# Patient Record
Sex: FEMALE | Race: WHITE | NOT HISPANIC OR LATINO | Employment: OTHER | URBAN - METROPOLITAN AREA
[De-identification: names, ages, dates, MRNs, and addresses within clinical notes are randomized per-mention and may not be internally consistent; named-entity substitution may affect disease eponyms.]

---

## 2022-07-19 PROCEDURE — U0004 COV-19 TEST NON-CDC HGH THRU: HCPCS | Performed by: FAMILY MEDICINE

## 2022-07-25 ENCOUNTER — TELEPHONE (OUTPATIENT)
Dept: URGENT CARE | Facility: CLINIC | Age: 37
End: 2022-07-25

## 2022-07-25 NOTE — TELEPHONE ENCOUNTER
PT CALLED REQUESTING COVID TEST RESULTS. INFORMED COVID WAS NOT DETECTED. NO QUESTIONS, NO CONCERNS.

## 2022-08-19 ENCOUNTER — HOSPITAL ENCOUNTER (INPATIENT)
Facility: HOSPITAL | Age: 37
LOS: 5 days | Discharge: HOME | End: 2022-08-24
Attending: EMERGENCY MEDICINE | Admitting: INTERNAL MEDICINE

## 2022-08-19 ENCOUNTER — APPOINTMENT (OUTPATIENT)
Dept: GENERAL RADIOLOGY | Facility: HOSPITAL | Age: 37
End: 2022-08-19

## 2022-08-19 ENCOUNTER — APPOINTMENT (OUTPATIENT)
Dept: CT IMAGING | Facility: HOSPITAL | Age: 37
End: 2022-08-19

## 2022-08-19 ENCOUNTER — APPOINTMENT (OUTPATIENT)
Dept: CARDIOLOGY | Facility: HOSPITAL | Age: 37
End: 2022-08-19

## 2022-08-19 DIAGNOSIS — I50.21 ACUTE SYSTOLIC HEART FAILURE: Primary | ICD-10-CM

## 2022-08-19 DIAGNOSIS — I50.9 ACUTE ON CHRONIC CONGESTIVE HEART FAILURE, UNSPECIFIED HEART FAILURE TYPE: ICD-10-CM

## 2022-08-19 DIAGNOSIS — R06.02 SHORTNESS OF BREATH: ICD-10-CM

## 2022-08-19 PROBLEM — I34.0 NONRHEUMATIC MITRAL VALVE REGURGITATION: Status: ACTIVE | Noted: 2022-08-19

## 2022-08-19 PROBLEM — R06.03 RESPIRATORY DISTRESS: Status: ACTIVE | Noted: 2022-08-19

## 2022-08-19 PROBLEM — E87.70 VOLUME OVERLOAD: Status: ACTIVE | Noted: 2022-08-19

## 2022-08-19 LAB
ALBUMIN SERPL-MCNC: 3.4 G/DL (ref 3.5–5.2)
ALBUMIN/GLOB SERPL: 1.1 G/DL
ALP SERPL-CCNC: 88 U/L (ref 39–117)
ALT SERPL W P-5'-P-CCNC: 48 U/L (ref 1–33)
ANION GAP SERPL CALCULATED.3IONS-SCNC: 7 MMOL/L (ref 5–15)
ASCENDING AORTA: 3 CM
AST SERPL-CCNC: 56 U/L (ref 1–32)
B PARAPERT DNA SPEC QL NAA+PROBE: NOT DETECTED
B PERT DNA SPEC QL NAA+PROBE: NOT DETECTED
BASOPHILS # BLD AUTO: 0.08 10*3/MM3 (ref 0–0.2)
BASOPHILS NFR BLD AUTO: 0.7 % (ref 0–1.5)
BH CV ECHO MEAS - AO MAX PG: 7.3 MMHG
BH CV ECHO MEAS - AO MEAN PG: 4 MMHG
BH CV ECHO MEAS - AO ROOT DIAM: 2.6 CM
BH CV ECHO MEAS - AO V2 MAX: 135 CM/SEC
BH CV ECHO MEAS - AO V2 VTI: 20.4 CM
BH CV ECHO MEAS - AVA(I,D): 3 CM2
BH CV ECHO MEAS - EDV(CUBED): 238.3 ML
BH CV ECHO MEAS - EDV(MOD-SP2): 205 ML
BH CV ECHO MEAS - EDV(MOD-SP4): 244 ML
BH CV ECHO MEAS - EF(MOD-BP): 18.6 %
BH CV ECHO MEAS - EF(MOD-SP2): 10.2 %
BH CV ECHO MEAS - EF(MOD-SP4): 25 %
BH CV ECHO MEAS - ESV(CUBED): 175.6 ML
BH CV ECHO MEAS - ESV(MOD-SP2): 184 ML
BH CV ECHO MEAS - ESV(MOD-SP4): 183 ML
BH CV ECHO MEAS - FS: 9.7 %
BH CV ECHO MEAS - IVS/LVPW: 1.13 CM
BH CV ECHO MEAS - IVSD: 0.9 CM
BH CV ECHO MEAS - LA DIMENSION: 4.4 CM
BH CV ECHO MEAS - LAT PEAK E' VEL: 11.4 CM/SEC
BH CV ECHO MEAS - LV DIASTOLIC VOL/BSA (35-75): 122.3 CM2
BH CV ECHO MEAS - LV MASS(C)D: 212.5 GRAMS
BH CV ECHO MEAS - LV MAX PG: 5.4 MMHG
BH CV ECHO MEAS - LV MEAN PG: 2 MMHG
BH CV ECHO MEAS - LV SYSTOLIC VOL/BSA (12-30): 91.7 CM2
BH CV ECHO MEAS - LV V1 MAX: 116 CM/SEC
BH CV ECHO MEAS - LV V1 VTI: 16 CM
BH CV ECHO MEAS - LVIDD: 6.2 CM
BH CV ECHO MEAS - LVIDS: 5.6 CM
BH CV ECHO MEAS - LVOT AREA: 3.8 CM2
BH CV ECHO MEAS - LVOT DIAM: 2.2 CM
BH CV ECHO MEAS - LVPWD: 0.8 CM
BH CV ECHO MEAS - MED PEAK E' VEL: 13.2 CM/SEC
BH CV ECHO MEAS - MR MAX PG: 82.1 MMHG
BH CV ECHO MEAS - MR MAX VEL: 453 CM/SEC
BH CV ECHO MEAS - MR MEAN PG: 51 MMHG
BH CV ECHO MEAS - MR MEAN VEL: 324 CM/SEC
BH CV ECHO MEAS - MR VTI: 117 CM
BH CV ECHO MEAS - MV DEC SLOPE: 1141 CM/SEC2
BH CV ECHO MEAS - MV DEC TIME: 0.18 MSEC
BH CV ECHO MEAS - MV E MAX VEL: 127 CM/SEC
BH CV ECHO MEAS - MV P1/2T: 49.5 MSEC
BH CV ECHO MEAS - MVA(P1/2T): 4.4 CM2
BH CV ECHO MEAS - PA ACC TIME: 0.09 SEC
BH CV ECHO MEAS - PA PR(ACCEL): 37.6 MMHG
BH CV ECHO MEAS - PA V2 MAX: 72 CM/SEC
BH CV ECHO MEAS - RAP SYSTOLE: 15 MMHG
BH CV ECHO MEAS - RVSP: 51 MMHG
BH CV ECHO MEAS - SI(MOD-SP2): 10.5 ML/M2
BH CV ECHO MEAS - SI(MOD-SP4): 30.6 ML/M2
BH CV ECHO MEAS - SV(LVOT): 60.8 ML
BH CV ECHO MEAS - SV(MOD-SP2): 21 ML
BH CV ECHO MEAS - SV(MOD-SP4): 61 ML
BH CV ECHO MEAS - TAPSE (>1.6): 1.92 CM
BH CV ECHO MEAS - TR MAX PG: 36 MMHG
BH CV ECHO MEAS - TR MAX VEL: 300 CM/SEC
BH CV ECHO MEASUREMENTS AVERAGE E/E' RATIO: 10.33
BH CV VAS BP LEFT ARM: NORMAL MMHG
BH CV XLRA - RV BASE: 3.9 CM
BH CV XLRA - RV LENGTH: 7 CM
BH CV XLRA - RV MID: 2.8 CM
BH CV XLRA - TDI S': 11.9 CM/SEC
BILIRUB SERPL-MCNC: 0.3 MG/DL (ref 0–1.2)
BUN SERPL-MCNC: 13 MG/DL (ref 6–20)
BUN/CREAT SERPL: 20.6 (ref 7–25)
C PNEUM DNA NPH QL NAA+NON-PROBE: NOT DETECTED
CALCIUM SPEC-SCNC: 8.9 MG/DL (ref 8.6–10.5)
CHLORIDE SERPL-SCNC: 107 MMOL/L (ref 98–107)
CO2 SERPL-SCNC: 26 MMOL/L (ref 22–29)
CREAT SERPL-MCNC: 0.63 MG/DL (ref 0.57–1)
DEPRECATED RDW RBC AUTO: 49.7 FL (ref 37–54)
EGFRCR SERPLBLD CKD-EPI 2021: 116.6 ML/MIN/1.73
EOSINOPHIL # BLD AUTO: 0.46 10*3/MM3 (ref 0–0.4)
EOSINOPHIL NFR BLD AUTO: 4.1 % (ref 0.3–6.2)
ERYTHROCYTE [DISTWIDTH] IN BLOOD BY AUTOMATED COUNT: 14.1 % (ref 12.3–15.4)
FLUAV SUBTYP SPEC NAA+PROBE: NOT DETECTED
FLUBV RNA ISLT QL NAA+PROBE: NOT DETECTED
GLOBULIN UR ELPH-MCNC: 3.1 GM/DL
GLUCOSE SERPL-MCNC: 108 MG/DL (ref 65–99)
HADV DNA SPEC NAA+PROBE: NOT DETECTED
HCOV 229E RNA SPEC QL NAA+PROBE: NOT DETECTED
HCOV HKU1 RNA SPEC QL NAA+PROBE: NOT DETECTED
HCOV NL63 RNA SPEC QL NAA+PROBE: NOT DETECTED
HCOV OC43 RNA SPEC QL NAA+PROBE: NOT DETECTED
HCT VFR BLD AUTO: 38 % (ref 34–46.6)
HGB BLD-MCNC: 12.3 G/DL (ref 12–15.9)
HMPV RNA NPH QL NAA+NON-PROBE: NOT DETECTED
HOLD SPECIMEN: NORMAL
HPIV1 RNA ISLT QL NAA+PROBE: NOT DETECTED
HPIV2 RNA SPEC QL NAA+PROBE: NOT DETECTED
HPIV3 RNA NPH QL NAA+PROBE: NOT DETECTED
HPIV4 P GENE NPH QL NAA+PROBE: NOT DETECTED
IMM GRANULOCYTES # BLD AUTO: 0.04 10*3/MM3 (ref 0–0.05)
IMM GRANULOCYTES NFR BLD AUTO: 0.4 % (ref 0–0.5)
LEFT ATRIUM VOLUME INDEX: 45.2 ML/M2
LYMPHOCYTES # BLD AUTO: 3.69 10*3/MM3 (ref 0.7–3.1)
LYMPHOCYTES NFR BLD AUTO: 32.9 % (ref 19.6–45.3)
M PNEUMO IGG SER IA-ACNC: NOT DETECTED
MAXIMAL PREDICTED HEART RATE: 182 BPM
MCH RBC QN AUTO: 31.1 PG (ref 26.6–33)
MCHC RBC AUTO-ENTMCNC: 32.4 G/DL (ref 31.5–35.7)
MCV RBC AUTO: 96 FL (ref 79–97)
MONOCYTES # BLD AUTO: 0.99 10*3/MM3 (ref 0.1–0.9)
MONOCYTES NFR BLD AUTO: 8.8 % (ref 5–12)
NEUTROPHILS NFR BLD AUTO: 5.97 10*3/MM3 (ref 1.7–7)
NEUTROPHILS NFR BLD AUTO: 53.1 % (ref 42.7–76)
NRBC BLD AUTO-RTO: 0 /100 WBC (ref 0–0.2)
NT-PROBNP SERPL-MCNC: 5156 PG/ML (ref 0–450)
PLATELET # BLD AUTO: 447 10*3/MM3 (ref 140–450)
PMV BLD AUTO: 9.8 FL (ref 6–12)
POTASSIUM SERPL-SCNC: 4.4 MMOL/L (ref 3.5–5.2)
PROT SERPL-MCNC: 6.5 G/DL (ref 6–8.5)
RBC # BLD AUTO: 3.96 10*6/MM3 (ref 3.77–5.28)
RHINOVIRUS RNA SPEC NAA+PROBE: NOT DETECTED
RSV RNA NPH QL NAA+NON-PROBE: NOT DETECTED
SARS-COV-2 RNA NPH QL NAA+NON-PROBE: NOT DETECTED
SODIUM SERPL-SCNC: 140 MMOL/L (ref 136–145)
STRESS TARGET HR: 155 BPM
TROPONIN T SERPL-MCNC: <0.01 NG/ML (ref 0–0.03)
TSH SERPL DL<=0.05 MIU/L-ACNC: 3.23 UIU/ML (ref 0.27–4.2)
WBC NRBC COR # BLD: 11.23 10*3/MM3 (ref 3.4–10.8)
WHOLE BLOOD HOLD COAG: NORMAL
WHOLE BLOOD HOLD SPECIMEN: NORMAL

## 2022-08-19 PROCEDURE — 84484 ASSAY OF TROPONIN QUANT: CPT

## 2022-08-19 PROCEDURE — 71045 X-RAY EXAM CHEST 1 VIEW: CPT

## 2022-08-19 PROCEDURE — 25010000002 MORPHINE PER 10 MG: Performed by: EMERGENCY MEDICINE

## 2022-08-19 PROCEDURE — 25010000002 FUROSEMIDE PER 20 MG: Performed by: EMERGENCY MEDICINE

## 2022-08-19 PROCEDURE — 93306 TTE W/DOPPLER COMPLETE: CPT | Performed by: INTERNAL MEDICINE

## 2022-08-19 PROCEDURE — 99284 EMERGENCY DEPT VISIT MOD MDM: CPT

## 2022-08-19 PROCEDURE — 99222 1ST HOSP IP/OBS MODERATE 55: CPT | Performed by: STUDENT IN AN ORGANIZED HEALTH CARE EDUCATION/TRAINING PROGRAM

## 2022-08-19 PROCEDURE — 93306 TTE W/DOPPLER COMPLETE: CPT

## 2022-08-19 PROCEDURE — 0202U NFCT DS 22 TRGT SARS-COV-2: CPT | Performed by: EMERGENCY MEDICINE

## 2022-08-19 PROCEDURE — 25010000002 ENOXAPARIN PER 10 MG: Performed by: STUDENT IN AN ORGANIZED HEALTH CARE EDUCATION/TRAINING PROGRAM

## 2022-08-19 PROCEDURE — 93005 ELECTROCARDIOGRAM TRACING: CPT

## 2022-08-19 PROCEDURE — 71275 CT ANGIOGRAPHY CHEST: CPT

## 2022-08-19 PROCEDURE — 80050 GENERAL HEALTH PANEL: CPT

## 2022-08-19 PROCEDURE — 25010000002 METHYLPREDNISOLONE PER 125 MG: Performed by: EMERGENCY MEDICINE

## 2022-08-19 PROCEDURE — 83880 ASSAY OF NATRIURETIC PEPTIDE: CPT

## 2022-08-19 PROCEDURE — 0 IOPAMIDOL PER 1 ML: Performed by: EMERGENCY MEDICINE

## 2022-08-19 PROCEDURE — 25010000002 SULFUR HEXAFLUORIDE MICROSPH 60.7-25 MG RECONSTITUTED SUSPENSION: Performed by: STUDENT IN AN ORGANIZED HEALTH CARE EDUCATION/TRAINING PROGRAM

## 2022-08-19 PROCEDURE — 94640 AIRWAY INHALATION TREATMENT: CPT

## 2022-08-19 RX ORDER — SODIUM CHLORIDE 0.9 % (FLUSH) 0.9 %
10 SYRINGE (ML) INJECTION EVERY 12 HOURS SCHEDULED
Status: DISCONTINUED | OUTPATIENT
Start: 2022-08-19 | End: 2022-08-24 | Stop reason: HOSPADM

## 2022-08-19 RX ORDER — FUROSEMIDE 10 MG/ML
80 INJECTION INTRAMUSCULAR; INTRAVENOUS ONCE
Status: COMPLETED | OUTPATIENT
Start: 2022-08-19 | End: 2022-08-19

## 2022-08-19 RX ORDER — SODIUM CHLORIDE 0.9 % (FLUSH) 0.9 %
10 SYRINGE (ML) INJECTION AS NEEDED
Status: DISCONTINUED | OUTPATIENT
Start: 2022-08-19 | End: 2022-08-24 | Stop reason: HOSPADM

## 2022-08-19 RX ORDER — METHYLPREDNISOLONE SODIUM SUCCINATE 125 MG/2ML
125 INJECTION, POWDER, LYOPHILIZED, FOR SOLUTION INTRAMUSCULAR; INTRAVENOUS ONCE
Status: COMPLETED | OUTPATIENT
Start: 2022-08-19 | End: 2022-08-19

## 2022-08-19 RX ORDER — FUROSEMIDE 10 MG/ML
40 INJECTION INTRAMUSCULAR; INTRAVENOUS DAILY
Status: DISCONTINUED | OUTPATIENT
Start: 2022-08-20 | End: 2022-08-21

## 2022-08-19 RX ORDER — ENOXAPARIN SODIUM 100 MG/ML
40 INJECTION SUBCUTANEOUS DAILY
Status: DISCONTINUED | OUTPATIENT
Start: 2022-08-19 | End: 2022-08-22

## 2022-08-19 RX ORDER — ALBUTEROL SULFATE 2.5 MG/3ML
2.5 SOLUTION RESPIRATORY (INHALATION) EVERY 6 HOURS PRN
Refills: 0 | Status: DISCONTINUED | OUTPATIENT
Start: 2022-08-19 | End: 2022-08-24 | Stop reason: HOSPADM

## 2022-08-19 RX ORDER — BENZONATATE 100 MG/1
200 CAPSULE ORAL 3 TIMES DAILY PRN
Status: DISCONTINUED | OUTPATIENT
Start: 2022-08-19 | End: 2022-08-24 | Stop reason: HOSPADM

## 2022-08-19 RX ORDER — GUAIFENESIN AND DEXTROMETHORPHAN HYDROBROMIDE 100; 10 MG/5ML; MG/5ML
5 SOLUTION ORAL EVERY 12 HOURS
Status: ON HOLD | COMMUNITY
End: 2022-08-20

## 2022-08-19 RX ORDER — IPRATROPIUM BROMIDE AND ALBUTEROL SULFATE 2.5; .5 MG/3ML; MG/3ML
3 SOLUTION RESPIRATORY (INHALATION) ONCE
Status: COMPLETED | OUTPATIENT
Start: 2022-08-19 | End: 2022-08-19

## 2022-08-19 RX ORDER — MORPHINE SULFATE 2 MG/ML
2 INJECTION, SOLUTION INTRAMUSCULAR; INTRAVENOUS
Status: DISCONTINUED | OUTPATIENT
Start: 2022-08-19 | End: 2022-08-24 | Stop reason: HOSPADM

## 2022-08-19 RX ADMIN — IPRATROPIUM BROMIDE AND ALBUTEROL SULFATE 3 ML: .5; 3 SOLUTION RESPIRATORY (INHALATION) at 14:48

## 2022-08-19 RX ADMIN — FUROSEMIDE 80 MG: 10 INJECTION, SOLUTION INTRAMUSCULAR; INTRAVENOUS at 15:51

## 2022-08-19 RX ADMIN — IOPAMIDOL 85 ML: 755 INJECTION, SOLUTION INTRAVENOUS at 14:33

## 2022-08-19 RX ADMIN — METHYLPREDNISOLONE SODIUM SUCCINATE 125 MG: 125 INJECTION, POWDER, FOR SOLUTION INTRAMUSCULAR; INTRAVENOUS at 14:00

## 2022-08-19 RX ADMIN — ENOXAPARIN SODIUM 40 MG: 40 INJECTION SUBCUTANEOUS at 21:06

## 2022-08-19 RX ADMIN — MORPHINE SULFATE 2 MG: 2 INJECTION, SOLUTION INTRAMUSCULAR; INTRAVENOUS at 21:10

## 2022-08-19 RX ADMIN — Medication 10 ML: at 21:06

## 2022-08-19 RX ADMIN — MORPHINE SULFATE 2 MG: 2 INJECTION, SOLUTION INTRAMUSCULAR; INTRAVENOUS at 12:39

## 2022-08-19 RX ADMIN — SULFUR HEXAFLUORIDE 2 ML: KIT at 17:03

## 2022-08-19 NOTE — ED PROVIDER NOTES
Subjective   Pt is a 36 y/o female who c/o of shortness of breath and cough x 1 month. Pt had swelling on her hands and face which was new, and prompted her visit to the ER today. Pt has been previously treated for acute bronchitis, and finished the steroid pack 1.5 weeks ago. Pt has pain with inspiration and cough which she rates as an 8/10. The pain is located in her chest and epigastric region with no radiation. Pt states the cough is productive with clear sputum. Pt had no relief of the swelling with benadryl. Pt has also had no relief from inhaler or breathing treatment she was given at first urgent care visit. Pt has had no known sick contacts and no one around her has similar sx. Pt denies eating any new food or trying any new facial/body products before the swelling occurred.She notes a history of COPD but does not take regular medication for this or follow with a PCP. Denies smoking.          Review of Systems   Constitutional: Negative for fever.   Respiratory: Positive for cough and shortness of breath.    Cardiovascular:        Chest and epigastric pain with inspiration   All other systems reviewed and are negative.      Past Medical History:   Diagnosis Date   • Asthma    • COPD (chronic obstructive pulmonary disease) ([facility])        Allergies   Allergen Reactions   • Amoxicillin Swelling       No past surgical history on file.    No family history on file.    Social History     Socioeconomic History   • Marital status: Single   Tobacco Use   • Smoking status: Passive Smoke Exposure - Never Smoker   • Smokeless tobacco: Never Used   Vaping Use   • Vaping Use: Never used   Substance and Sexual Activity   • Alcohol use: Never   • Drug use: Never   • Sexual activity: Defer           Objective   Physical Exam  Vitals and nursing note reviewed.   HENT:      Head: Normocephalic and atraumatic.      Mouth/Throat:      Mouth: Mucous membranes are moist.   Eyes:      Extraocular Movements: Extraocular movements  intact.      Pupils: Pupils are equal, round, and reactive to light.   Cardiovascular:      Rate and Rhythm: Regular rhythm. Tachycardia present.   Pulmonary:      Effort: Respiratory distress (Mild respiratory distress) present.      Breath sounds: Wheezing present. No decreased breath sounds, rhonchi or rales.   Chest:      Chest wall: Tenderness present. No edema.   Abdominal:      General: Bowel sounds are normal.      Palpations: Abdomen is soft.   Musculoskeletal:         General: Normal range of motion.      Cervical back: Normal range of motion.      Right lower leg: No edema.      Left lower leg: No edema.   Skin:     Findings: No erythema or rash.   Neurological:      General: No focal deficit present.      Mental Status: She is alert and oriented to person, place, and time.   Psychiatric:         Mood and Affect: Mood normal.         Behavior: Behavior normal.         Procedures           ED Course      Adult Transthoracic Echo Complete W/ Cont if Necessary Per Protocol    Result Date: 8/19/2022  · Left ventricular ejection fraction appears to be less than 20%. Left ventricular systolic function is severely decreased. · Left ventricular diastolic function is consistent with (grade II w/high LAP) pseudonormalization. · Severe mitral valve regurgitation is present. · The left ventricular cavity is moderately dilated. · Left atrial volume is moderately increased. · Estimated right ventricular systolic pressure from tricuspid regurgitation is moderately elevated (45-55 mmHg). Calculated right ventricular systolic pressure from tricuspid regurgitation is 51 mmHg. · Mildly reduced right ventricular systolic function noted.      XR Chest 2 View    Result Date: 8/4/2022  DATE OF EXAM: 8/4/2022 11:46 AM  PROCEDURE: XR CHEST 2 VW-  INDICATIONS: cough for 2 weeks  COMPARISON: No comparisons available.  TECHNIQUE: Two radiologic views of the chest, PA and lateral, were obtained.  FINDINGS: The heart size appears  normal. There is mild peribronchial thickening most notable in the right base. No focal consolidation is identified. The lungs are otherwise clear      Peribronchial thickening particularly in the right base which could reflect bronchitis or mild bronchiectasis. Chest otherwise clear  This report was finalized on 8/4/2022 12:04 PM by Bairon Traore MD.         XR Chest 1 View    Result Date: 8/19/2022  XR CHEST 1 VW-  Date of Exam: 8/19/2022 10:50 AM  Indication: SOA triage protocol.  Comparison Exams: August 4, 2022  Technique: Single AP chest radiograph  FINDINGS: There is a mild opacity in the right lower lung. The heart and mediastinal contours appear normal. The pulmonary vasculature appears normal. The osseous structures appear intact.      Mild opacity within right lower lung, which could reflect atelectasis or pneumonia.  This report was finalized on 8/19/2022 11:15 AM by Onur Baires MD.      CT Angiogram Chest    Result Date: 8/19/2022  DATE OF EXAM: 8/19/2022 2:30 PM  PROCEDURE: CT ANGIOGRAM CHEST-  INDICATIONS: SOA, multiple physician visists  COMPARISON: No comparisons available.  TECHNIQUE: Contiguous axial imaging was obtained from the thoracic inlet through the upper abdomen following the intravenous administration of 100 mL of Isovue 370. Reconstructed coronal and sagittal images were also obtained. Automated exposure control and iterative reconstruction methods were used.  The radiation dose reduction device was turned on for each scan per the ALARA (As Low as Reasonably Achievable) protocol.  FINDINGS: There is no pathologic axillary adenopathy or other worrisome body wall soft tissue finding in the chest. No acute findings are present in the partially characterized upper abdomen. There are are moderate to large bilateral pleural effusions. There is no significant pericardial effusion. There is no pathologic mediastinal lymphadenopathy. Nonaneurysmal thoracic aorta. Evaluation of the osseous  structures demonstrates no evidence of acute fracture or aggressive osseous lesion. Evaluation of the lung fields demonstrates diffuse interlobular septal thickening with intervening groundglass opacity consistent with pulmonary edema. There is no focal consolidation or suspicious pulmonary nodularity. The pulmonary arteries are well-opacified, without focal filling defect concerning for acute pulmonary embolus.      Moderate to severe pulmonary edema with moderate to large bilateral pleural effusions present. There is no acute pulmonary embolus.  This report was finalized on 8/19/2022 2:44 PM by Jamison Hicks.          Latest Reference Range & Units 08/19/22 10:50   Troponin T 0.000 - 0.030 ng/mL <0.010   proBNP 0.0 - 450.0 pg/mL 5,156.0 (H)   Glucose 65 - 99 mg/dL 108 (H)   Sodium 136 - 145 mmol/L 140   Potassium 3.5 - 5.2 mmol/L 4.4   CO2 22.0 - 29.0 mmol/L 26.0   Chloride 98 - 107 mmol/L 107   Anion Gap 5.0 - 15.0 mmol/L 7.0   Creatinine 0.57 - 1.00 mg/dL 0.63   BUN 6 - 20 mg/dL 13   BUN/Creatinine Ratio 7.0 - 25.0  20.6   Calcium 8.6 - 10.5 mg/dL 8.9   eGFR >60.0 mL/min/1.73 116.6   Alkaline Phosphatase 39 - 117 U/L 88   Total Protein 6.0 - 8.5 g/dL 6.5   ALT (SGPT) 1 - 33 U/L 48 (H)   AST (SGOT) 1 - 32 U/L 56 (H)   Total Bilirubin 0.0 - 1.2 mg/dL 0.3   Albumin 3.50 - 5.20 g/dL 3.40 (L)   Globulin gm/dL 3.1   A/G Ratio g/dL 1.1   TSH Baseline 0.270 - 4.200 uIU/mL 3.230   WBC 3.40 - 10.80 10*3/mm3 11.23 (H)   RBC 3.77 - 5.28 10*6/mm3 3.96   Hemoglobin 12.0 - 15.9 g/dL 12.3   Hematocrit 34.0 - 46.6 % 38.0   RDW 12.3 - 15.4 % 14.1   MCV 79.0 - 97.0 fL 96.0   MCH 26.6 - 33.0 pg 31.1   MCHC 31.5 - 35.7 g/dL 32.4   MPV 6.0 - 12.0 fL 9.8   Platelets 140 - 450 10*3/mm3 447   RDW-SD 37.0 - 54.0 fl 49.7   Neutrophil Rel % 42.7 - 76.0 % 53.1   Lymphocyte Rel % 19.6 - 45.3 % 32.9   Monocyte Rel % 5.0 - 12.0 % 8.8   Eosinophil Rel % 0.3 - 6.2 % 4.1   Basophil Rel % 0.0 - 1.5 % 0.7   Immature Granulocyte Rel % 0.0 - 0.5 %  0.4   Neutrophils Absolute 1.70 - 7.00 10*3/mm3 5.97   Lymphocytes Absolute 0.70 - 3.10 10*3/mm3 3.69 (H)   Monocytes Absolute 0.10 - 0.90 10*3/mm3 0.99 (H)   Eosinophils Absolute 0.00 - 0.40 10*3/mm3 0.46 (H)   Basophils Absolute 0.00 - 0.20 10*3/mm3 0.08   Immature Grans, Absolute 0.00 - 0.05 10*3/mm3 0.04   nRBC 0.0 - 0.2 /100 WBC 0.0   (H): Data is abnormally high  (L): Data is abnormally low                                MDM    Final diagnoses:   Shortness of breath   Acute on chronic congestive heart failure, unspecified heart failure type (HCC)       ED Disposition  ED Disposition     ED Disposition   Decision to Admit    Condition   --    Comment   Level of Care: Telemetry [5]   Diagnosis: Respiratory distress [328574]   Certification: I Certify That Inpatient Hospital Services Are Medically Necessary For Greater Than 2 Midnights                  Moshe Mei DO  08/28/22 8906

## 2022-08-20 PROBLEM — E66.9 OBESITY (BMI 30-39.9): Status: ACTIVE | Noted: 2022-08-20

## 2022-08-20 PROBLEM — J44.9 COPD (CHRONIC OBSTRUCTIVE PULMONARY DISEASE): Status: ACTIVE | Noted: 2022-08-20

## 2022-08-20 LAB
ALBUMIN SERPL-MCNC: 3.1 G/DL (ref 3.5–5.2)
ALBUMIN/GLOB SERPL: 0.9 G/DL
ALP SERPL-CCNC: 93 U/L (ref 39–117)
ALT SERPL W P-5'-P-CCNC: 45 U/L (ref 1–33)
AMPHET+METHAMPHET UR QL: NEGATIVE
AMPHETAMINES UR QL: POSITIVE
ANION GAP SERPL CALCULATED.3IONS-SCNC: 9 MMOL/L (ref 5–15)
AST SERPL-CCNC: 50 U/L (ref 1–32)
BARBITURATES UR QL SCN: NEGATIVE
BASOPHILS # BLD AUTO: 0.04 10*3/MM3 (ref 0–0.2)
BASOPHILS NFR BLD AUTO: 0.2 % (ref 0–1.5)
BENZODIAZ UR QL SCN: NEGATIVE
BILIRUB SERPL-MCNC: 0.3 MG/DL (ref 0–1.2)
BUN SERPL-MCNC: 16 MG/DL (ref 6–20)
BUN/CREAT SERPL: 18.8 (ref 7–25)
BUPRENORPHINE SERPL-MCNC: NEGATIVE NG/ML
CALCIUM SPEC-SCNC: 8.7 MG/DL (ref 8.6–10.5)
CANNABINOIDS SERPL QL: NEGATIVE
CHLORIDE SERPL-SCNC: 100 MMOL/L (ref 98–107)
CO2 SERPL-SCNC: 27 MMOL/L (ref 22–29)
COCAINE UR QL: NEGATIVE
CREAT SERPL-MCNC: 0.85 MG/DL (ref 0.57–1)
DEPRECATED RDW RBC AUTO: 46.4 FL (ref 37–54)
EGFRCR SERPLBLD CKD-EPI 2021: 90.1 ML/MIN/1.73
EOSINOPHIL # BLD AUTO: 0.01 10*3/MM3 (ref 0–0.4)
EOSINOPHIL NFR BLD AUTO: 0.1 % (ref 0.3–6.2)
ERYTHROCYTE [DISTWIDTH] IN BLOOD BY AUTOMATED COUNT: 13.8 % (ref 12.3–15.4)
GLOBULIN UR ELPH-MCNC: 3.6 GM/DL
GLUCOSE SERPL-MCNC: 144 MG/DL (ref 65–99)
HCT VFR BLD AUTO: 37.9 % (ref 34–46.6)
HGB BLD-MCNC: 12.8 G/DL (ref 12–15.9)
IMM GRANULOCYTES # BLD AUTO: 0.12 10*3/MM3 (ref 0–0.05)
IMM GRANULOCYTES NFR BLD AUTO: 0.7 % (ref 0–0.5)
LYMPHOCYTES # BLD AUTO: 2.15 10*3/MM3 (ref 0.7–3.1)
LYMPHOCYTES NFR BLD AUTO: 11.9 % (ref 19.6–45.3)
MCH RBC QN AUTO: 31.4 PG (ref 26.6–33)
MCHC RBC AUTO-ENTMCNC: 33.8 G/DL (ref 31.5–35.7)
MCV RBC AUTO: 93.1 FL (ref 79–97)
METHADONE UR QL SCN: NEGATIVE
MONOCYTES # BLD AUTO: 1.17 10*3/MM3 (ref 0.1–0.9)
MONOCYTES NFR BLD AUTO: 6.5 % (ref 5–12)
NEUTROPHILS NFR BLD AUTO: 14.57 10*3/MM3 (ref 1.7–7)
NEUTROPHILS NFR BLD AUTO: 80.6 % (ref 42.7–76)
NRBC BLD AUTO-RTO: 0 /100 WBC (ref 0–0.2)
OPIATES UR QL: POSITIVE
OXYCODONE UR QL SCN: NEGATIVE
PCP UR QL SCN: NEGATIVE
PLATELET # BLD AUTO: 539 10*3/MM3 (ref 140–450)
PMV BLD AUTO: 9.9 FL (ref 6–12)
POTASSIUM SERPL-SCNC: 4.7 MMOL/L (ref 3.5–5.2)
PROCALCITONIN SERPL-MCNC: 0.03 NG/ML (ref 0–0.25)
PROPOXYPH UR QL: NEGATIVE
PROT SERPL-MCNC: 6.7 G/DL (ref 6–8.5)
RBC # BLD AUTO: 4.07 10*6/MM3 (ref 3.77–5.28)
SODIUM SERPL-SCNC: 136 MMOL/L (ref 136–145)
TRICYCLICS UR QL SCN: NEGATIVE
TROPONIN T SERPL-MCNC: <0.01 NG/ML (ref 0–0.03)
WBC NRBC COR # BLD: 18.06 10*3/MM3 (ref 3.4–10.8)

## 2022-08-20 PROCEDURE — 80053 COMPREHEN METABOLIC PANEL: CPT | Performed by: STUDENT IN AN ORGANIZED HEALTH CARE EDUCATION/TRAINING PROGRAM

## 2022-08-20 PROCEDURE — 85025 COMPLETE CBC W/AUTO DIFF WBC: CPT | Performed by: STUDENT IN AN ORGANIZED HEALTH CARE EDUCATION/TRAINING PROGRAM

## 2022-08-20 PROCEDURE — 84145 PROCALCITONIN (PCT): CPT | Performed by: INTERNAL MEDICINE

## 2022-08-20 PROCEDURE — 80306 DRUG TEST PRSMV INSTRMNT: CPT | Performed by: NURSE PRACTITIONER

## 2022-08-20 PROCEDURE — 99222 1ST HOSP IP/OBS MODERATE 55: CPT | Performed by: INTERNAL MEDICINE

## 2022-08-20 PROCEDURE — 25010000002 FUROSEMIDE PER 20 MG: Performed by: STUDENT IN AN ORGANIZED HEALTH CARE EDUCATION/TRAINING PROGRAM

## 2022-08-20 PROCEDURE — 99232 SBSQ HOSP IP/OBS MODERATE 35: CPT | Performed by: INTERNAL MEDICINE

## 2022-08-20 PROCEDURE — 84484 ASSAY OF TROPONIN QUANT: CPT | Performed by: STUDENT IN AN ORGANIZED HEALTH CARE EDUCATION/TRAINING PROGRAM

## 2022-08-20 PROCEDURE — 25010000002 ENOXAPARIN PER 10 MG: Performed by: STUDENT IN AN ORGANIZED HEALTH CARE EDUCATION/TRAINING PROGRAM

## 2022-08-20 RX ORDER — DEXTROMETHORPHAN HYDROBROMIDE AND PROMETHAZINE HYDROCHLORIDE 15; 6.25 MG/5ML; MG/5ML
5 SOLUTION ORAL 4 TIMES DAILY PRN
COMMUNITY
End: 2022-08-24 | Stop reason: HOSPADM

## 2022-08-20 RX ORDER — DIGOXIN 250 MCG
250 TABLET ORAL
Status: DISCONTINUED | OUTPATIENT
Start: 2022-08-20 | End: 2022-08-24 | Stop reason: HOSPADM

## 2022-08-20 RX ADMIN — SACUBITRIL AND VALSARTAN 0.5 TABLET: 24; 26 TABLET, FILM COATED ORAL at 14:10

## 2022-08-20 RX ADMIN — Medication 10 ML: at 20:45

## 2022-08-20 RX ADMIN — SACUBITRIL AND VALSARTAN 0.5 TABLET: 24; 26 TABLET, FILM COATED ORAL at 20:45

## 2022-08-20 RX ADMIN — DIGOXIN 250 MCG: 250 TABLET ORAL at 14:10

## 2022-08-20 RX ADMIN — ENOXAPARIN SODIUM 40 MG: 40 INJECTION SUBCUTANEOUS at 08:17

## 2022-08-20 RX ADMIN — Medication 10 ML: at 08:18

## 2022-08-20 RX ADMIN — FUROSEMIDE 40 MG: 10 INJECTION, SOLUTION INTRAMUSCULAR; INTRAVENOUS at 08:17

## 2022-08-20 NOTE — H&P
[facility] Medicine Services  HISTORY AND PHYSICAL    Patient Name: Herminia Hernandez  : 1985  MRN: [id number]  Primary Care Physician: Sabine, No Known  Date of admission: 2022      Subjective   Subjective     Chief Complaint:  Dyspnea    HPI:  Herminia Hernandez is a 37 y.o. female with a remote history of what she says is COPD diagnosed as a child, who is presenting with increasing dyspnea and lower extremity swelling.  She states she was diagnosed with COPD when she was 4 secondary to secondhand smoke.  For the past several weeks she has been having increasing dyspnea cough and swelling, she was told that she has bronchitis and was given steroids and breathing treatments without relief.  She is presenting today because the dyspnea is getting worse and worse.      Review of Systems   Gen- No fevers, chills  CV- No chest pain, palpitations  Resp- +dyspnea  GI- No N/V/D, abd pain      All other systems reviewed and are negative.     Personal History     Past Medical History:   Diagnosis Date   • Asthma    • COPD (chronic obstructive pulmonary disease) (HCC)              No past surgical history on file.    Family History: Tobacco abuse, COPD family history is not on file. Otherwise pertinent FHx was reviewed and unremarkable.     Social History:  reports that she is a non-smoker but has been exposed to tobacco smoke. She has never used smokeless tobacco. She reports that she does not drink alcohol and does not use drugs.  Social History     Social History Narrative   • Not on file       Medications:  Available home medication information reviewed.  Medications Prior to Admission   Medication Sig Dispense Refill Last Dose   • albuterol sulfate  (90 Base) MCG/ACT inhaler Inhale 2 puffs 4 (Four) Times a Day. 18 g 0    • dextromethorphan-guaifenesin (ROBITUSSIN-DM)  MG/5ML syrup Take 5 mL by mouth Every 12 (Twelve) Hours.          Allergies   Allergen Reactions   •  Amoxicillin Swelling       Objective   Objective     Vital Signs:   Temp:  [97.9 °F (36.6 °C)-98.1 °F (36.7 °C)] 98.1 °F (36.7 °C)  Heart Rate:  [107-121] 113  Resp:  [16-20] 19  BP: (114-127)/(75-99) 117/75       Physical Exam   Constitutional: Appears to be in mild distress, awake alert  HENT: NCAT, mucous membranes moist  Respiratory: Diffuse crackles bilaterally, appears tachypneic  Cardiovascular: Tachycardic, no murmurs, rubs, or gallops  Gastrointestinal: Positive bowel sounds, soft, nontender, nondistended  Musculoskeletal: 1+ bilateral ankle edema  Psychiatric: Appropriate affect, cooperative  Neurologic: Oriented x 3, strength symmetric in all extremities, Cranial Nerves grossly intact to confrontation, speech clear  Skin: No rashes      Result Review:  I have personally reviewed the results from the time of this admission to 8/19/2022 22:29 EDT and agree with these findings:  [x]  Laboratory list / accordion  [x]  Microbiology  [x]  Radiology  []  EKG/Telemetry   []  Cardiology/Vascular   []  Pathology  []  Old records  []  Other:  Most notable findings include:   Pulmonary edema, bilateral pleural effusions on CTA  Elevated proBNP  Echo performed shows EF of less than 20%        LAB RESULTS:      Lab 08/19/22  1050   WBC 11.23*   HEMOGLOBIN 12.3   HEMATOCRIT 38.0   PLATELETS 447   NEUTROS ABS 5.97   IMMATURE GRANS (ABS) 0.04   LYMPHS ABS 3.69*   MONOS ABS 0.99*   EOS ABS 0.46*   MCV 96.0         Lab 08/19/22  1050   SODIUM 140   POTASSIUM 4.4   CHLORIDE 107   CO2 26.0   ANION GAP 7.0   BUN 13   CREATININE 0.63   EGFR 116.6   GLUCOSE 108*   CALCIUM 8.9   TSH 3.230         Lab 08/19/22  1050   TOTAL PROTEIN 6.5   ALBUMIN 3.40*   GLOBULIN 3.1   ALT (SGPT) 48*   AST (SGOT) 56*   BILIRUBIN 0.3   ALK PHOS 88         Lab 08/19/22  1050   PROBNP 5,156.0*   TROPONIN T <0.010                     Microbiology Results (last 10 days)     Procedure Component Value - Date/Time    COVID PRE-OP / PRE-PROCEDURE SCREENING  ORDER (NO ISOLATION) - Swab, Nasopharynx [[id number]]  (Normal) Collected: 08/19/22 1235    Lab Status: Final result Specimen: Swab from Nasopharynx Updated: 08/19/22 1340    Narrative:      The following orders were created for panel order COVID PRE-OP / PRE-PROCEDURE SCREENING ORDER (NO ISOLATION) - Swab, Nasopharynx.  Procedure                               Abnormality         Status                     ---------                               -----------         ------                     Respiratory Panel PCR w/...[[id number]]  Normal              Final result                 Please view results for these tests on the individual orders.    Respiratory Panel PCR w/COVID-19(SARS-CoV-2) LILLIAN/FABRICE/LIVIA/PAD/COR/MAD/CRISTINA In-House, NP Swab in UTM/VTM, 3-4 HR TAT - Swab, Nasopharynx [[id number]]  (Normal) Collected: 08/19/22 1235    Lab Status: Final result Specimen: Swab from Nasopharynx Updated: 08/19/22 1340     ADENOVIRUS, PCR Not Detected     Coronavirus 229E Not Detected     Coronavirus HKU1 Not Detected     Coronavirus NL63 Not Detected     Coronavirus OC43 Not Detected     COVID19 Not Detected     Human Metapneumovirus Not Detected     Human Rhinovirus/Enterovirus Not Detected     Influenza A PCR Not Detected     Influenza B PCR Not Detected     Parainfluenza Virus 1 Not Detected     Parainfluenza Virus 2 Not Detected     Parainfluenza Virus 3 Not Detected     Parainfluenza Virus 4 Not Detected     RSV, PCR Not Detected     Bordetella pertussis pcr Not Detected     Bordetella parapertussis PCR Not Detected     Chlamydophila pneumoniae PCR Not Detected     Mycoplasma pneumo by PCR Not Detected    Narrative:      In the setting of a positive respiratory panel with a viral infection PLUS a negative procalcitonin without other underlying concern for bacterial infection, consider observing off antibiotics or discontinuation of antibiotics and continue supportive care. If the respiratory panel is positive for atypical  bacterial infection (Bordetella pertussis, Chlamydophila pneumoniae, or Mycoplasma pneumoniae), consider antibiotic de-escalation to target atypical bacterial infection.          Adult Transthoracic Echo Complete W/ Cont if Necessary Per Protocol    Result Date: 8/19/2022  · Left ventricular ejection fraction appears to be less than 20%. Left ventricular systolic function is severely decreased. · Left ventricular diastolic function is consistent with (grade II w/high LAP) pseudonormalization. · Severe mitral valve regurgitation is present. · The left ventricular cavity is moderately dilated. · Left atrial volume is moderately increased. · Estimated right ventricular systolic pressure from tricuspid regurgitation is moderately elevated (45-55 mmHg). Calculated right ventricular systolic pressure from tricuspid regurgitation is 51 mmHg. · Mildly reduced right ventricular systolic function noted.      XR Chest 1 View    Result Date: 8/19/2022  XR CHEST 1 VW-  Date of Exam: 8/19/2022 10:50 AM  Indication: SOA triage protocol.  Comparison Exams: August 4, 2022  Technique: Single AP chest radiograph  FINDINGS: There is a mild opacity in the right lower lung. The heart and mediastinal contours appear normal. The pulmonary vasculature appears normal. The osseous structures appear intact.      Impression: Mild opacity within right lower lung, which could reflect atelectasis or pneumonia.  This report was finalized on 8/19/2022 11:15 AM by Onur Baires MD.      CT Angiogram Chest    Result Date: 8/19/2022  DATE OF EXAM: 8/19/2022 2:30 PM  PROCEDURE: CT ANGIOGRAM CHEST-  INDICATIONS: SOA, multiple physician visists  COMPARISON: No comparisons available.  TECHNIQUE: Contiguous axial imaging was obtained from the thoracic inlet through the upper abdomen following the intravenous administration of 100 mL of Isovue 370. Reconstructed coronal and sagittal images were also obtained. Automated exposure control and iterative  reconstruction methods were used.  The radiation dose reduction device was turned on for each scan per the ALARA (As Low as Reasonably Achievable) protocol.  FINDINGS: There is no pathologic axillary adenopathy or other worrisome body wall soft tissue finding in the chest. No acute findings are present in the partially characterized upper abdomen. There are are moderate to large bilateral pleural effusions. There is no significant pericardial effusion. There is no pathologic mediastinal lymphadenopathy. Nonaneurysmal thoracic aorta. Evaluation of the osseous structures demonstrates no evidence of acute fracture or aggressive osseous lesion. Evaluation of the lung fields demonstrates diffuse interlobular septal thickening with intervening groundglass opacity consistent with pulmonary edema. There is no focal consolidation or suspicious pulmonary nodularity. The pulmonary arteries are well-opacified, without focal filling defect concerning for acute pulmonary embolus.      Impression: Moderate to severe pulmonary edema with moderate to large bilateral pleural effusions present. There is no acute pulmonary embolus.  This report was finalized on 8/19/2022 2:44 PM by Jamison Hicks.        Results for orders placed during the hospital encounter of 08/19/22    Adult Transthoracic Echo Complete W/ Cont if Necessary Per Protocol    Interpretation Summary  · Left ventricular ejection fraction appears to be less than 20%. Left ventricular systolic function is severely decreased.  · Left ventricular diastolic function is consistent with (grade II w/high LAP) pseudonormalization.  · Severe mitral valve regurgitation is present.  · The left ventricular cavity is moderately dilated.  · Left atrial volume is moderately increased.  · Estimated right ventricular systolic pressure from tricuspid regurgitation is moderately elevated (45-55 mmHg). Calculated right ventricular systolic pressure from tricuspid regurgitation is 51  mmHg.  · Mildly reduced right ventricular systolic function noted.      Assessment & Plan   Assessment & Plan     Active Hospital Problems    Diagnosis  POA   • Respiratory distress [R06.03]  Yes   • Acute systolic heart failure (HCC) [I50.21]  Unknown   • Volume overload [E87.70]  Unknown   • Nonrheumatic mitral valve regurgitation [I34.0]  Unknown       Herminia Hernandez is a 37-year-old female who is presenting with worsening dyspnea on and swelling, found to have imaging evidence of pulmonary edema and pleural effusions and clinically volume overloaded.  Echo performed in the ED showed EF of 20%    Acute respiratory failure  Acute decompensated heart failure with reduced ejection fraction  Volume overload  - IV diuresis  - Echo shows severely decreased left ventricular systolic function, severe mitral regurgitation  - Cardiology consult  - Daily weights, strict I's and O's  --trend troponin  --telemetry    DVT prophylaxis:  lovenox      CODE STATUS:  full  Code Status and Medical Interventions:   Ordered at: 08/19/22 3626     Level Of Support Discussed With:    Patient     Code Status (Patient has no pulse and is not breathing):    CPR (Attempt to Resuscitate)     Medical Interventions (Patient has pulse or is breathing):    Full Support         Azul Qureshi MD  08/19/22

## 2022-08-20 NOTE — CONSULTS
"[facility] Consult/H&P Note      Referring Provider: No ref. provider found  Primary Provider:  Provider, No Known  Reason for Consultation: Acute systolic heart failure    PROBLEM LIST:  1. Acute systolic heart failure  a. Echo 8/19/2022, RDS: EF 20%, grade 2 diastolic dysfunction, severe MR, LV moderately dilated, LA moderately dilated, RVSP 45 to 55 mmHg, mildly reduced right ventricular systolic function.  2. Mitral regurgitation  3. Reported history of COPD  asthma      HISTORY OF PRESENT ILLNESS:  Herminia Hernandez is a 37 y.o. female with the above noted pmhx who presented with complaints of worsening shortness of breath and lower extremity edema.  She reports being treated recently for bronchitis with steroids and breathing treatments without any improvement.  In fact her symptoms have only been getting worse. Workup has included: CTA chest- moderate to severe pulmonary edema with moderate to large bilateral pleural effusions, no PE. EKG sinus tachycardia with non specific ST seg changes, Troponin neg x 2, ProBNP 5156. Cardiology has been consulted for acute systolic heart failure.     She reports feeling a nearly constant pressure in left side of chest. Made worse by laying on left side, bending over and walking. This has been present x 2 days. AGUAYO/SOA worsened x 2 days. Swelling also present for 2 days. She has no known cardiac history. She reports that her sister has a \"murmur\". She does not know anything further. Otherwise no family history of heart disease.       REVIEW OF SYSTEMS  Pertinent positives are listed in the HPI and all other ROS are negative.      SOCIAL HISTORY:   reports that she is a non-smoker but has been exposed to tobacco smoke. She has never used smokeless tobacco. She reports that she does not drink alcohol and does not use drugs.     FAMILY HISTORY:  family history is not on file.     CURRENT MEDICATIONS:      Current Facility-Administered Medications:   •  albuterol " "(PROVENTIL) nebulizer solution 0.083% 2.5 mg/3mL, 2.5 mg, Nebulization, Q6H PRN, Azul Qureshi MD  •  benzonatate (TESSALON) capsule 200 mg, 200 mg, Oral, TID PRN, Azul Qureshi MD  •  Enoxaparin Sodium (LOVENOX) syringe 40 mg, 40 mg, Subcutaneous, Daily, Azul Qureshi MD, 40 mg at 08/20/22 0817  •  furosemide (LASIX) injection 40 mg, 40 mg, Intravenous, Daily, Azul Qureshi MD, 40 mg at 08/20/22 0817  •  morphine injection 2 mg, 2 mg, Intravenous, Q15 Min PRN, Moshe Mei DO, 2 mg at 08/19/22 2110  •  sodium chloride 0.9 % flush 10 mL, 10 mL, Intravenous, PRN, Emergency, Triage Protocol, MD  •  sodium chloride 0.9 % flush 10 mL, 10 mL, Intravenous, Q12H, Azul Qureshi MD, 10 mL at 08/20/22 0818  •  sodium chloride 0.9 % flush 10 mL, 10 mL, Intravenous, PRN, Azul Qureshi MD     Allergies:  Amoxicillin    Objective     Vital Sign Min/Max for last 24 hours  Temp  Min: 97.9 °F (36.6 °C)  Max: 98.4 °F (36.9 °C)   BP  Min: 108/63  Max: 127/99   Pulse  Min: 100  Max: 121   Resp  Min: 16  Max: 20   SpO2  Min: 92 %  Max: 97 %   No data recorded   Weight  Min: 90.3 kg (199 lb)  Max: 91.8 kg (202 lb 6.4 oz)     Flowsheet Rows    Flowsheet Row First Filed Value   Admission Height 167.6 cm (66\") Documented at 08/19/2022 1025   Admission Weight 90.3 kg (199 lb) Documented at 08/19/2022 1025          PHYSICAL EXAM:  GENERAL: This is a well-developed, well-nourished, female who is in no acute distress.   SKIN: Pink and warm without rash or abnormality noted.   HEENT: Head is normocephalic and atraumatic. Pupils are equal and reactive to light bilaterally. Mucous membranes are pink and moist.   NECK: Supple without lymphadenopathy or thyromegaly. There is no jugular venous distention at 30°.  LUNGS: Clear to auscultation bilaterally without wheezing, rhonchi, or rales noted.   CARDIOVASCULAR: The heart has a regular rhythm, tachy rate with a normal S1 and S2. There is no MR murmurheard. Summation gallop present, no rub, or click " appreciated. The PMI is laterally displaced. Carotid upstrokes are 2+ and symmetrical without bruits.   ABDOMEN: Soft and nondistended with positive bowel sounds x4. The patient denies tenderness of palpitation.   MUSCULOSKELETAL: There are no obvious bony abnormalities. Normal range of tenderness to palpation.   NEUROLOGICAL: Nonfocal. Alert and oriented x3.   PERIPHERAL VASCULAR: Femoral pulses are 2+ and symmetrical without bruits. Posterior tibial and dorsalis pedis pulses are 2+ and symmetrical. There is no peripheral edema.   PSYCH: Normal mood and affect.      EKG:  See HPI  Tele: Sinus tachycardia    LABS:      Lab Results   Component Value Date    WBC 18.06 (H) 08/20/2022    HGB 12.8 08/20/2022    HCT 37.9 08/20/2022    MCV 93.1 08/20/2022     (H) 08/20/2022     Lab Results   Component Value Date    GLUCOSE 144 (H) 08/20/2022    BUN 16 08/20/2022    CREATININE 0.85 08/20/2022    BCR 18.8 08/20/2022    K 4.7 08/20/2022    CO2 27.0 08/20/2022    CALCIUM 8.7 08/20/2022    ALBUMIN 3.10 (L) 08/20/2022    AST 50 (H) 08/20/2022    ALT 45 (H) 08/20/2022     Lab Results   Component Value Date    TROPONINT <0.010 08/20/2022     No results found for: INR, PROTIME  No results found for: CHOL, CHLPL, TRIG, HDL, LDL, LDLDIRECT     Results Review: I reviewed the patient's new clinical results.      ASSESSMENT:    1. Acute systolic heart failure  1. Echo 8/19/2022, RDS: EF 20%, grade 2 diastolic dysfunction, severe MR, LV moderately dilated, LA moderately dilated, RVSP 45 to 55 mmHg, mildly reduced right ventricular systolic function.  2. Mitral regurgitation  3. Reported history of COPD  asthma      PLAN:    1. Digoxin 250 mcg daily  2. Begin Entresto 24-26 1/2 tablet twice daily  3. Low-sodium diet  4. Strict I's and O's  5. Daily weights  6. Start oral Bumex tomorrow  Left heart catheterization with possible intervention using the right femoral artery on Monday (the ulnar pulses are not adequate for radial  access) the patient understands risks and benefits and agrees to proceed.      I discussed the patient's findings and my recommendations with patient.    Scribed for Orquidea Tom MD by ASH Trevizo, APRN. 8/20/2022  08:30 EDT    I Orquidea Tom MD personally performed the services described in this documentation as scribed by the above individual in my presence, and it is both accurate and complete.    Orquidea Tom MD, FACC

## 2022-08-20 NOTE — PROGRESS NOTES
[facility] Medicine Services  PROGRESS NOTE    Patient Name: Herminia Hernandez  : 1985  MRN: [id number]    Date of Admission: 2022  Primary Care Physician: Provider, No Known    Subjective   Subjective     CC:  F/U CHF    HPI:  She is feeling better after some diuresis.    ROS:  Gen- No fevers  CV- No chest pain, palpitations  Resp- No cough, improving dyspnea    Objective   Objective     Vital Signs:   Temp:  [97.9 °F (36.6 °C)-98.4 °F (36.9 °C)] 98.3 °F (36.8 °C)  Heart Rate:  [100-121] 116  Resp:  [16-20] 18  BP: (108-127)/(63-99) 112/66  Flow (L/min):  [2] 2     Physical Exam:  Constitutional: No acute distress, awake, alert, sitting up in bed  HENT: NCAT, mucous membranes moist  Respiratory: Clear to auscultation bilaterally, respiratory effort normal on nasal cannula  Cardiovascular: RRR  Gastrointestinal: Positive bowel sounds, soft, nontender, nondistended  Musculoskeletal: Trace bilateral ankle edema  Psychiatric: Appropriate affect, cooperative  Neurologic: Speech clear and fluent  Skin: No rashes on exposed surfaces    Results Reviewed:  LAB RESULTS:      Lab 22  0622  1050   WBC 18.06* 11.23*   HEMOGLOBIN 12.8 12.3   HEMATOCRIT 37.9 38.0   PLATELETS 539* 447   NEUTROS ABS 14.57* 5.97   IMMATURE GRANS (ABS) 0.12* 0.04   LYMPHS ABS 2.15 3.69*   MONOS ABS 1.17* 0.99*   EOS ABS 0.01 0.46*   MCV 93.1 96.0         Lab 22  0622  1050   SODIUM 136 140   POTASSIUM 4.7 4.4   CHLORIDE 100 107   CO2 27.0 26.0   ANION GAP 9.0 7.0   BUN 16 13   CREATININE 0.85 0.63   EGFR 90.1 116.6   GLUCOSE 144* 108*   CALCIUM 8.7 8.9   TSH  --  3.230         Lab 22  0612 22  1050   TOTAL PROTEIN 6.7 6.5   ALBUMIN 3.10* 3.40*   GLOBULIN 3.6 3.1   ALT (SGPT) 45* 48*   AST (SGOT) 50* 56*   BILIRUBIN 0.3 0.3   ALK PHOS 93 88         Lab 22  0612 22  1050   PROBNP  --  5,156.0*   TROPONIN T <0.010 <0.010                 Brief Urine Lab Results      None          Microbiology Results Abnormal     Procedure Component Value - Date/Time    COVID PRE-OP / PRE-PROCEDURE SCREENING ORDER (NO ISOLATION) - Swab, Nasopharynx [[id number]]  (Normal) Collected: 08/19/22 1235    Lab Status: Final result Specimen: Swab from Nasopharynx Updated: 08/19/22 1340    Narrative:      The following orders were created for panel order COVID PRE-OP / PRE-PROCEDURE SCREENING ORDER (NO ISOLATION) - Swab, Nasopharynx.  Procedure                               Abnormality         Status                     ---------                               -----------         ------                     Respiratory Panel PCR w/...[[id number]]  Normal              Final result                 Please view results for these tests on the individual orders.    Respiratory Panel PCR w/COVID-19(SARS-CoV-2) LILLIAN/FABRICE/LIVIA/PAD/COR/MAD/CRISTINA In-House, NP Swab in UTM/VTM, 3-4 HR TAT - Swab, Nasopharynx [[id number]]  (Normal) Collected: 08/19/22 1235    Lab Status: Final result Specimen: Swab from Nasopharynx Updated: 08/19/22 1340     ADENOVIRUS, PCR Not Detected     Coronavirus 229E Not Detected     Coronavirus HKU1 Not Detected     Coronavirus NL63 Not Detected     Coronavirus OC43 Not Detected     COVID19 Not Detected     Human Metapneumovirus Not Detected     Human Rhinovirus/Enterovirus Not Detected     Influenza A PCR Not Detected     Influenza B PCR Not Detected     Parainfluenza Virus 1 Not Detected     Parainfluenza Virus 2 Not Detected     Parainfluenza Virus 3 Not Detected     Parainfluenza Virus 4 Not Detected     RSV, PCR Not Detected     Bordetella pertussis pcr Not Detected     Bordetella parapertussis PCR Not Detected     Chlamydophila pneumoniae PCR Not Detected     Mycoplasma pneumo by PCR Not Detected    Narrative:      In the setting of a positive respiratory panel with a viral infection PLUS a negative procalcitonin without other underlying concern for bacterial infection, consider observing off  antibiotics or discontinuation of antibiotics and continue supportive care. If the respiratory panel is positive for atypical bacterial infection (Bordetella pertussis, Chlamydophila pneumoniae, or Mycoplasma pneumoniae), consider antibiotic de-escalation to target atypical bacterial infection.          Adult Transthoracic Echo Complete W/ Cont if Necessary Per Protocol    Result Date: 8/19/2022  · Left ventricular ejection fraction appears to be less than 20%. Left ventricular systolic function is severely decreased. · Left ventricular diastolic function is consistent with (grade II w/high LAP) pseudonormalization. · Severe mitral valve regurgitation is present. · The left ventricular cavity is moderately dilated. · Left atrial volume is moderately increased. · Estimated right ventricular systolic pressure from tricuspid regurgitation is moderately elevated (45-55 mmHg). Calculated right ventricular systolic pressure from tricuspid regurgitation is 51 mmHg. · Mildly reduced right ventricular systolic function noted.      XR Chest 1 View    Result Date: 8/19/2022  XR CHEST 1 VW-  Date of Exam: 8/19/2022 10:50 AM  Indication: SOA triage protocol.  Comparison Exams: August 4, 2022  Technique: Single AP chest radiograph  FINDINGS: There is a mild opacity in the right lower lung. The heart and mediastinal contours appear normal. The pulmonary vasculature appears normal. The osseous structures appear intact.      Impression: Mild opacity within right lower lung, which could reflect atelectasis or pneumonia.  This report was finalized on 8/19/2022 11:15 AM by Onur Baires MD.      CT Angiogram Chest    Result Date: 8/19/2022  DATE OF EXAM: 8/19/2022 2:30 PM  PROCEDURE: CT ANGIOGRAM CHEST-  INDICATIONS: SOA, multiple physician visists  COMPARISON: No comparisons available.  TECHNIQUE: Contiguous axial imaging was obtained from the thoracic inlet through the upper abdomen following the intravenous administration of 100  mL of Isovue 370. Reconstructed coronal and sagittal images were also obtained. Automated exposure control and iterative reconstruction methods were used.  The radiation dose reduction device was turned on for each scan per the ALARA (As Low as Reasonably Achievable) protocol.  FINDINGS: There is no pathologic axillary adenopathy or other worrisome body wall soft tissue finding in the chest. No acute findings are present in the partially characterized upper abdomen. There are are moderate to large bilateral pleural effusions. There is no significant pericardial effusion. There is no pathologic mediastinal lymphadenopathy. Nonaneurysmal thoracic aorta. Evaluation of the osseous structures demonstrates no evidence of acute fracture or aggressive osseous lesion. Evaluation of the lung fields demonstrates diffuse interlobular septal thickening with intervening groundglass opacity consistent with pulmonary edema. There is no focal consolidation or suspicious pulmonary nodularity. The pulmonary arteries are well-opacified, without focal filling defect concerning for acute pulmonary embolus.      Impression: Moderate to severe pulmonary edema with moderate to large bilateral pleural effusions present. There is no acute pulmonary embolus.  This report was finalized on 8/19/2022 2:44 PM by Jamison Hicks.        Results for orders placed during the hospital encounter of 08/19/22    Adult Transthoracic Echo Complete W/ Cont if Necessary Per Protocol    Interpretation Summary  · Left ventricular ejection fraction appears to be less than 20%. Left ventricular systolic function is severely decreased.  · Left ventricular diastolic function is consistent with (grade II w/high LAP) pseudonormalization.  · Severe mitral valve regurgitation is present.  · The left ventricular cavity is moderately dilated.  · Left atrial volume is moderately increased.  · Estimated right ventricular systolic pressure from tricuspid regurgitation is  moderately elevated (45-55 mmHg). Calculated right ventricular systolic pressure from tricuspid regurgitation is 51 mmHg.  · Mildly reduced right ventricular systolic function noted.      I have reviewed the medications:  Scheduled Meds:enoxaparin, 40 mg, Subcutaneous, Daily  furosemide, 40 mg, Intravenous, Daily  sodium chloride, 10 mL, Intravenous, Q12H      Continuous Infusions:   PRN Meds:.•  albuterol  •  benzonatate  •  Morphine  •  sodium chloride  •  sodium chloride    Assessment & Plan   Assessment & Plan     Active Hospital Problems    Diagnosis  POA   • Respiratory distress [R06.03]  Yes   • Acute systolic heart failure (HCC) [I50.21]  Unknown   • Volume overload [E87.70]  Unknown   • Nonrheumatic mitral valve regurgitation [I34.0]  Unknown      Resolved Hospital Problems   No resolved problems to display.        Brief Hospital Course to date:  Herminia Hernandez is a 37 y.o. female with COPD who presented to the ED due to increasing dyspnea and swelling.  She was found to have a reduced LVEF of 20% on echocardiogram.    This patient's problems and plans were partially entered by my partner and updated as appropriate by me 08/20/22.    Acute respiratory failure  Acute decompensated heart failure with reduced ejection fraction  Volume overload  - Continue IV diuresis  - Echo shows severely decreased left ventricular systolic function, severe mitral regurgitation  - Cardiology consulted.  Adding entresto and planning LHC on Monday.    Leukocytosis  -- Suspect due to steroids.  Procalcitonin is low and she does not have any cough to suggest pneumonia.  Continue to monitor off antibiotics.    Expected Discharge Location and Transportation: Home  Expected Discharge Date: 8/23    DVT prophylaxis:  Medical DVT prophylaxis orders are present.     AM-PAC 6 Clicks Score (PT): 24 (08/20/22 0800)    CODE STATUS:   Code Status and Medical Interventions:   Ordered at: 08/19/22 3913     Level Of Support Discussed With:     Patient     Code Status (Patient has no pulse and is not breathing):    CPR (Attempt to Resuscitate)     Medical Interventions (Patient has pulse or is breathing):    Full Support       Francia Juan MD  08/20/22

## 2022-08-21 LAB
ANION GAP SERPL CALCULATED.3IONS-SCNC: 9 MMOL/L (ref 5–15)
BASOPHILS # BLD AUTO: 0.11 10*3/MM3 (ref 0–0.2)
BASOPHILS NFR BLD AUTO: 0.7 % (ref 0–1.5)
BUN SERPL-MCNC: 18 MG/DL (ref 6–20)
BUN/CREAT SERPL: 22.8 (ref 7–25)
CALCIUM SPEC-SCNC: 8.8 MG/DL (ref 8.6–10.5)
CHLORIDE SERPL-SCNC: 103 MMOL/L (ref 98–107)
CO2 SERPL-SCNC: 27 MMOL/L (ref 22–29)
CREAT SERPL-MCNC: 0.79 MG/DL (ref 0.57–1)
DEPRECATED RDW RBC AUTO: 47.6 FL (ref 37–54)
EGFRCR SERPLBLD CKD-EPI 2021: 98.3 ML/MIN/1.73
EOSINOPHIL # BLD AUTO: 0.49 10*3/MM3 (ref 0–0.4)
EOSINOPHIL NFR BLD AUTO: 3.3 % (ref 0.3–6.2)
ERYTHROCYTE [DISTWIDTH] IN BLOOD BY AUTOMATED COUNT: 13.8 % (ref 12.3–15.4)
GLUCOSE SERPL-MCNC: 107 MG/DL (ref 65–99)
HCT VFR BLD AUTO: 42.8 % (ref 34–46.6)
HGB BLD-MCNC: 13.9 G/DL (ref 12–15.9)
IMM GRANULOCYTES # BLD AUTO: 0.06 10*3/MM3 (ref 0–0.05)
IMM GRANULOCYTES NFR BLD AUTO: 0.4 % (ref 0–0.5)
LYMPHOCYTES # BLD AUTO: 4.94 10*3/MM3 (ref 0.7–3.1)
LYMPHOCYTES NFR BLD AUTO: 33.2 % (ref 19.6–45.3)
MCH RBC QN AUTO: 30.5 PG (ref 26.6–33)
MCHC RBC AUTO-ENTMCNC: 32.5 G/DL (ref 31.5–35.7)
MCV RBC AUTO: 93.9 FL (ref 79–97)
MONOCYTES # BLD AUTO: 1.06 10*3/MM3 (ref 0.1–0.9)
MONOCYTES NFR BLD AUTO: 7.1 % (ref 5–12)
NEUTROPHILS NFR BLD AUTO: 55.3 % (ref 42.7–76)
NEUTROPHILS NFR BLD AUTO: 8.21 10*3/MM3 (ref 1.7–7)
NRBC BLD AUTO-RTO: 0 /100 WBC (ref 0–0.2)
PLATELET # BLD AUTO: 555 10*3/MM3 (ref 140–450)
PMV BLD AUTO: 9.5 FL (ref 6–12)
POTASSIUM SERPL-SCNC: 4 MMOL/L (ref 3.5–5.2)
RBC # BLD AUTO: 4.56 10*6/MM3 (ref 3.77–5.28)
SODIUM SERPL-SCNC: 139 MMOL/L (ref 136–145)
WBC NRBC COR # BLD: 14.87 10*3/MM3 (ref 3.4–10.8)

## 2022-08-21 PROCEDURE — 99232 SBSQ HOSP IP/OBS MODERATE 35: CPT | Performed by: INTERNAL MEDICINE

## 2022-08-21 PROCEDURE — 85025 COMPLETE CBC W/AUTO DIFF WBC: CPT | Performed by: INTERNAL MEDICINE

## 2022-08-21 PROCEDURE — 25010000002 FUROSEMIDE PER 20 MG: Performed by: STUDENT IN AN ORGANIZED HEALTH CARE EDUCATION/TRAINING PROGRAM

## 2022-08-21 PROCEDURE — 80048 BASIC METABOLIC PNL TOTAL CA: CPT | Performed by: INTERNAL MEDICINE

## 2022-08-21 PROCEDURE — 25010000002 ENOXAPARIN PER 10 MG: Performed by: STUDENT IN AN ORGANIZED HEALTH CARE EDUCATION/TRAINING PROGRAM

## 2022-08-21 RX ORDER — BUMETANIDE 1 MG/1
0.5 TABLET ORAL DAILY
Status: DISCONTINUED | OUTPATIENT
Start: 2022-08-22 | End: 2022-08-24 | Stop reason: HOSPADM

## 2022-08-21 RX ADMIN — FUROSEMIDE 40 MG: 10 INJECTION, SOLUTION INTRAMUSCULAR; INTRAVENOUS at 09:20

## 2022-08-21 RX ADMIN — Medication 10 ML: at 21:29

## 2022-08-21 RX ADMIN — DIGOXIN 250 MCG: 250 TABLET ORAL at 12:34

## 2022-08-21 RX ADMIN — SACUBITRIL AND VALSARTAN 0.5 TABLET: 24; 26 TABLET, FILM COATED ORAL at 09:20

## 2022-08-21 RX ADMIN — Medication 10 ML: at 09:22

## 2022-08-21 RX ADMIN — ENOXAPARIN SODIUM 40 MG: 40 INJECTION SUBCUTANEOUS at 09:21

## 2022-08-21 RX ADMIN — SACUBITRIL AND VALSARTAN 0.5 TABLET: 24; 26 TABLET, FILM COATED ORAL at 21:27

## 2022-08-21 NOTE — PROGRESS NOTES
"[facility] Cardiology Daily Note       LOS: 2 days   Patient Care Team:  Provider, No Known as PCP - General    Chief Complaint: Cardiomyopathy/systolic heart failure    Subjective     Subjective: Heart rates have been near 100.  99% saturated on 2 L.  Blood pressures have been marginal.  Intake and output has not been accurately charted.  Drug screen positive for methamphetamine and opiates.  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  She has not used any other illicit drugs.    Review of Systems:   As above.    Medications:  digoxin, 250 mcg, Oral, Daily  enoxaparin, 40 mg, Subcutaneous, Daily  furosemide, 40 mg, Intravenous, Daily  pharmacy consult - MTM, , Does not apply, Daily  sacubitril-valsartan, 0.5 tablet, Oral, Q12H  sodium chloride, 10 mL, Intravenous, Q12H        Objective     Vital Sign Min/Max for last 24 hours  Temp  Min: 97.7 °F (36.5 °C)  Max: 98.3 °F (36.8 °C)   BP  Min: 97/72  Max: 110/80   Pulse  Min: 92  Max: 126   Resp  Min: 18  Max: 19   SpO2  Min: 94 %  Max: 100 %   Flow (L/min)  Min: 2  Max: 2   Weight  Min: 88 kg (194 lb)  Max: 88 kg (194 lb)      Intake/Output Summary (Last 24 hours) at 8/21/2022 1001  Last data filed at 8/20/2022 2100  Gross per 24 hour   Intake --   Output 1200 ml   Net -1200 ml        Flowsheet Rows    Flowsheet Row First Filed Value   Admission Height 167.6 cm (66\") Documented at 08/19/2022 1025   Admission Weight 90.3 kg (199 lb) Documented at 08/19/2022 1025          Physical Exam:    General: Alert and oriented.   Cardiovascular: Heart has a nondisplaced focal PMI.  Tachycardic regular rate and rhythm without murmur heard  Lungs: Clear without rales or wheezes. Equal expansion is noted.   Abdomen: Soft, nontender.  Extremities: Show no edema.   Skin: warm and dry.     Results Review:    I reviewed the patient's new clinical results.  EKG:  Tele: Sinus " tach    Labs:    Results from last 7 days   Lab Units 08/21/22  0549 08/20/22  0612 08/19/22  1050   SODIUM mmol/L 139 136 140   POTASSIUM mmol/L 4.0 4.7 4.4   CHLORIDE mmol/L 103 100 107   CO2 mmol/L 27.0 27.0 26.0   BUN mg/dL 18 16 13   CREATININE mg/dL 0.79 0.85 0.63   CALCIUM mg/dL 8.8 8.7 8.9   BILIRUBIN mg/dL  --  0.3 0.3   ALK PHOS U/L  --  93 88   ALT (SGPT) U/L  --  45* 48*   AST (SGOT) U/L  --  50* 56*   GLUCOSE mg/dL 107* 144* 108*     Results from last 7 days   Lab Units 08/21/22  0549 08/20/22  0612 08/19/22  1050   WBC 10*3/mm3 14.87* 18.06* 11.23*   HEMOGLOBIN g/dL 13.9 12.8 12.3   HEMATOCRIT % 42.8 37.9 38.0   PLATELETS 10*3/mm3 555* 539* 447     Lab Results   Component Value Date    TROPONINT <0.010 08/20/2022    TROPONINT <0.010 08/19/2022     No results found for: CHOL  No results found for: TRIG  No results found for: HDL  No components found for: LDLCALC  No results found for: INR, PROTIME      Ejection Fraction:    Assessment   Assessment:    1. Systolic heart failure, acute on chronic   1. Echo 8/19/2022, RDS: EF 20%, grade 2 diastolic dysfunction, severe MR, LV moderately dilated, LA moderately dilated, RVSP 45 to 55 mmHg, mildly reduced right ventricular systolic function.  2. Previous methamphetamine use  2. Mitral regurgitation  3. Reported history of COPD  asthma         Plan:    1. Digoxin 250 mcg daily  2. Begin Entresto 24-26 1/2 tablet twice daily  3. Low-sodium diet  4. Strict I's and O's  5. Daily weights  6. Start oral Bumex   7. Left heart catheterization with possible intervention using the right femoral artery on Monday (the ulnar pulses are not adequate for radial access) the patient understands risks and benefits and agrees to proceed.      Orquidea Tom MD  08/21/22  10:01 EDT

## 2022-08-21 NOTE — PROGRESS NOTES
[facility] Medicine Services  PROGRESS NOTE    Patient Name: Herminia Hernandez  : 1985  MRN: [id number]    Date of Admission: 2022  Primary Care Physician: Provider, No Known    Subjective   Subjective     CC:  F/U CHF    HPI:  Doing well today.  Urinating frequently and dyspnea improving.    ROS:  Gen- No fevers  Resp- As above    Objective   Objective     Vital Signs:   Temp:  [97.7 °F (36.5 °C)-98.3 °F (36.8 °C)] 97.7 °F (36.5 °C)  Heart Rate:  [] 109  Resp:  [18-19] 19  BP: ()/(72-90) 97/72  Flow (L/min):  [2] 2     Physical Exam:  Constitutional: No acute distress, awake, alert, sitting up in bed  HENT: NCAT, mucous membranes moist  Respiratory: Clear to auscultation bilaterally, respiratory effort normal on nasal cannula  Cardiovascular: RRR  Gastrointestinal: Positive bowel sounds, soft, nontender, nondistended  Musculoskeletal: Trace bilateral ankle edema  Psychiatric: Appropriate affect, cooperative  Neurologic: Speech clear and fluent  Skin: No rashes on exposed surfaces    Exam unchanged from     Results Reviewed:  LAB RESULTS:      Lab 22  0549 22  0612 22  1050   WBC 14.87* 18.06* 11.23*   HEMOGLOBIN 13.9 12.8 12.3   HEMATOCRIT 42.8 37.9 38.0   PLATELETS 555* 539* 447   NEUTROS ABS 8.21* 14.57* 5.97   IMMATURE GRANS (ABS) 0.06* 0.12* 0.04   LYMPHS ABS 4.94* 2.15 3.69*   MONOS ABS 1.06* 1.17* 0.99*   EOS ABS 0.49* 0.01 0.46*   MCV 93.9 93.1 96.0   PROCALCITONIN  --  0.03  --          Lab 22  0549 22  0612 22  1050   SODIUM 139 136 140   POTASSIUM 4.0 4.7 4.4   CHLORIDE 103 100 107   CO2 27.0 27.0 26.0   ANION GAP 9.0 9.0 7.0   BUN 18 16 13   CREATININE 0.79 0.85 0.63   EGFR 98.3 90.1 116.6   GLUCOSE 107* 144* 108*   CALCIUM 8.8 8.7 8.9   TSH  --   --  3.230         Lab 22  0612 22  1050   TOTAL PROTEIN 6.7 6.5   ALBUMIN 3.10* 3.40*   GLOBULIN 3.6 3.1   ALT (SGPT) 45* 48*   AST (SGOT) 50* 56*   BILIRUBIN 0.3  0.3   ALK PHOS 93 88         Lab 08/20/22  0612 08/19/22  1050   PROBNP  --  5,156.0*   TROPONIN T <0.010 <0.010                 Brief Urine Lab Results     None          Microbiology Results Abnormal     Procedure Component Value - Date/Time    COVID PRE-OP / PRE-PROCEDURE SCREENING ORDER (NO ISOLATION) - Swab, Nasopharynx [[id number]]  (Normal) Collected: 08/19/22 1235    Lab Status: Final result Specimen: Swab from Nasopharynx Updated: 08/19/22 1340    Narrative:      The following orders were created for panel order COVID PRE-OP / PRE-PROCEDURE SCREENING ORDER (NO ISOLATION) - Swab, Nasopharynx.  Procedure                               Abnormality         Status                     ---------                               -----------         ------                     Respiratory Panel PCR w/...[[id number]]  Normal              Final result                 Please view results for these tests on the individual orders.    Respiratory Panel PCR w/COVID-19(SARS-CoV-2) LILLIAN/FABRICE/LIVIA/PAD/COR/MAD/CRISTINA In-House, NP Swab in UTM/VTM, 3-4 HR TAT - Swab, Nasopharynx [[id number]]  (Normal) Collected: 08/19/22 1235    Lab Status: Final result Specimen: Swab from Nasopharynx Updated: 08/19/22 1340     ADENOVIRUS, PCR Not Detected     Coronavirus 229E Not Detected     Coronavirus HKU1 Not Detected     Coronavirus NL63 Not Detected     Coronavirus OC43 Not Detected     COVID19 Not Detected     Human Metapneumovirus Not Detected     Human Rhinovirus/Enterovirus Not Detected     Influenza A PCR Not Detected     Influenza B PCR Not Detected     Parainfluenza Virus 1 Not Detected     Parainfluenza Virus 2 Not Detected     Parainfluenza Virus 3 Not Detected     Parainfluenza Virus 4 Not Detected     RSV, PCR Not Detected     Bordetella pertussis pcr Not Detected     Bordetella parapertussis PCR Not Detected     Chlamydophila pneumoniae PCR Not Detected     Mycoplasma pneumo by PCR Not Detected    Narrative:      In the setting of a positive  respiratory panel with a viral infection PLUS a negative procalcitonin without other underlying concern for bacterial infection, consider observing off antibiotics or discontinuation of antibiotics and continue supportive care. If the respiratory panel is positive for atypical bacterial infection (Bordetella pertussis, Chlamydophila pneumoniae, or Mycoplasma pneumoniae), consider antibiotic de-escalation to target atypical bacterial infection.          Adult Transthoracic Echo Complete W/ Cont if Necessary Per Protocol    Result Date: 8/19/2022  · Left ventricular ejection fraction appears to be less than 20%. Left ventricular systolic function is severely decreased. · Left ventricular diastolic function is consistent with (grade II w/high LAP) pseudonormalization. · Severe mitral valve regurgitation is present. · The left ventricular cavity is moderately dilated. · Left atrial volume is moderately increased. · Estimated right ventricular systolic pressure from tricuspid regurgitation is moderately elevated (45-55 mmHg). Calculated right ventricular systolic pressure from tricuspid regurgitation is 51 mmHg. · Mildly reduced right ventricular systolic function noted.      XR Chest 1 View    Result Date: 8/19/2022  XR CHEST 1 VW-  Date of Exam: 8/19/2022 10:50 AM  Indication: SOA triage protocol.  Comparison Exams: August 4, 2022  Technique: Single AP chest radiograph  FINDINGS: There is a mild opacity in the right lower lung. The heart and mediastinal contours appear normal. The pulmonary vasculature appears normal. The osseous structures appear intact.      Impression: Mild opacity within right lower lung, which could reflect atelectasis or pneumonia.  This report was finalized on 8/19/2022 11:15 AM by Onur Baires MD.      CT Angiogram Chest    Result Date: 8/19/2022  DATE OF EXAM: 8/19/2022 2:30 PM  PROCEDURE: CT ANGIOGRAM CHEST-  INDICATIONS: SOA, multiple physician visists  COMPARISON: No comparisons  available.  TECHNIQUE: Contiguous axial imaging was obtained from the thoracic inlet through the upper abdomen following the intravenous administration of 100 mL of Isovue 370. Reconstructed coronal and sagittal images were also obtained. Automated exposure control and iterative reconstruction methods were used.  The radiation dose reduction device was turned on for each scan per the ALARA (As Low as Reasonably Achievable) protocol.  FINDINGS: There is no pathologic axillary adenopathy or other worrisome body wall soft tissue finding in the chest. No acute findings are present in the partially characterized upper abdomen. There are are moderate to large bilateral pleural effusions. There is no significant pericardial effusion. There is no pathologic mediastinal lymphadenopathy. Nonaneurysmal thoracic aorta. Evaluation of the osseous structures demonstrates no evidence of acute fracture or aggressive osseous lesion. Evaluation of the lung fields demonstrates diffuse interlobular septal thickening with intervening groundglass opacity consistent with pulmonary edema. There is no focal consolidation or suspicious pulmonary nodularity. The pulmonary arteries are well-opacified, without focal filling defect concerning for acute pulmonary embolus.      Impression: Moderate to severe pulmonary edema with moderate to large bilateral pleural effusions present. There is no acute pulmonary embolus.  This report was finalized on 8/19/2022 2:44 PM by Jamison Hicks.        Results for orders placed during the hospital encounter of 08/19/22    Adult Transthoracic Echo Complete W/ Cont if Necessary Per Protocol    Interpretation Summary  · Left ventricular ejection fraction appears to be less than 20%. Left ventricular systolic function is severely decreased.  · Left ventricular diastolic function is consistent with (grade II w/high LAP) pseudonormalization.  · Severe mitral valve regurgitation is present.  · The left ventricular  cavity is moderately dilated.  · Left atrial volume is moderately increased.  · Estimated right ventricular systolic pressure from tricuspid regurgitation is moderately elevated (45-55 mmHg). Calculated right ventricular systolic pressure from tricuspid regurgitation is 51 mmHg.  · Mildly reduced right ventricular systolic function noted.      I have reviewed the medications:  Scheduled Meds:digoxin, 250 mcg, Oral, Daily  enoxaparin, 40 mg, Subcutaneous, Daily  furosemide, 40 mg, Intravenous, Daily  pharmacy consult - MTM, , Does not apply, Daily  sacubitril-valsartan, 0.5 tablet, Oral, Q12H  sodium chloride, 10 mL, Intravenous, Q12H      Continuous Infusions:   PRN Meds:.•  albuterol  •  benzonatate  •  Morphine  •  sodium chloride  •  sodium chloride    Assessment & Plan   Assessment & Plan     Active Hospital Problems    Diagnosis  POA   • **Acute systolic heart failure (HCC) [I50.21]  Yes   • COPD (chronic obstructive pulmonary disease) (HCC) [J44.9]  Yes   • Obesity (BMI 30-39.9) [E66.9]  Yes   • Respiratory distress [R06.03]  Yes   • Volume overload [E87.70]  Yes   • Nonrheumatic mitral valve regurgitation [I34.0]  Yes      Resolved Hospital Problems   No resolved problems to display.        Brief Hospital Course to date:  Herminia Hernandez is a 37 y.o. female with COPD who presented to the ED due to increasing dyspnea and swelling.  She was found to have a reduced LVEF of 20% on echocardiogram.    This patient's problems and plans were partially entered by my partner and updated as appropriate by me 08/21/22.    Acute decompensated heart failure with reduced ejection fraction  Mitral valve regurgitation  Volume overload  - Continue IV diuresis  - Echo shows severely decreased left ventricular systolic function, severe mitral regurgitation  - Cardiology consulted.  Added entresto and digoxin, planning LHC on Monday.    Leukocytosis, improving  -- Suspect due to steroids.  Procalcitonin is low and she does not have  any cough to suggest pneumonia.  Continue to monitor off antibiotics.    Expected Discharge Location and Transportation: Home  Expected Discharge Date: 8/23    DVT prophylaxis:  Medical DVT prophylaxis orders are present.     AM-PAC 6 Clicks Score (PT): 24 (08/20/22 0800)    CODE STATUS:   Code Status and Medical Interventions:   Ordered at: 08/19/22 0830     Level Of Support Discussed With:    Patient     Code Status (Patient has no pulse and is not breathing):    CPR (Attempt to Resuscitate)     Medical Interventions (Patient has pulse or is breathing):    Full Support       Francia Juan MD  08/21/22

## 2022-08-22 LAB
ANION GAP SERPL CALCULATED.3IONS-SCNC: 9 MMOL/L (ref 5–15)
BASOPHILS # BLD AUTO: 0.11 10*3/MM3 (ref 0–0.2)
BASOPHILS NFR BLD AUTO: 0.8 % (ref 0–1.5)
BUN SERPL-MCNC: 15 MG/DL (ref 6–20)
BUN/CREAT SERPL: 19.7 (ref 7–25)
CALCIUM SPEC-SCNC: 8.8 MG/DL (ref 8.6–10.5)
CHLORIDE SERPL-SCNC: 101 MMOL/L (ref 98–107)
CO2 SERPL-SCNC: 26 MMOL/L (ref 22–29)
CREAT SERPL-MCNC: 0.76 MG/DL (ref 0.57–1)
DEPRECATED RDW RBC AUTO: 47.3 FL (ref 37–54)
EGFRCR SERPLBLD CKD-EPI 2021: 103 ML/MIN/1.73
EOSINOPHIL # BLD AUTO: 0.67 10*3/MM3 (ref 0–0.4)
EOSINOPHIL NFR BLD AUTO: 4.9 % (ref 0.3–6.2)
ERYTHROCYTE [DISTWIDTH] IN BLOOD BY AUTOMATED COUNT: 13.8 % (ref 12.3–15.4)
GLUCOSE SERPL-MCNC: 90 MG/DL (ref 65–99)
HCT VFR BLD AUTO: 43.7 % (ref 34–46.6)
HGB BLD-MCNC: 14.2 G/DL (ref 12–15.9)
IMM GRANULOCYTES # BLD AUTO: 0.06 10*3/MM3 (ref 0–0.05)
IMM GRANULOCYTES NFR BLD AUTO: 0.4 % (ref 0–0.5)
LYMPHOCYTES # BLD AUTO: 4.68 10*3/MM3 (ref 0.7–3.1)
LYMPHOCYTES NFR BLD AUTO: 34.1 % (ref 19.6–45.3)
MCH RBC QN AUTO: 30.5 PG (ref 26.6–33)
MCHC RBC AUTO-ENTMCNC: 32.5 G/DL (ref 31.5–35.7)
MCV RBC AUTO: 93.8 FL (ref 79–97)
MONOCYTES # BLD AUTO: 1.42 10*3/MM3 (ref 0.1–0.9)
MONOCYTES NFR BLD AUTO: 10.4 % (ref 5–12)
NEUTROPHILS NFR BLD AUTO: 49.4 % (ref 42.7–76)
NEUTROPHILS NFR BLD AUTO: 6.77 10*3/MM3 (ref 1.7–7)
NRBC BLD AUTO-RTO: 0 /100 WBC (ref 0–0.2)
PLATELET # BLD AUTO: 526 10*3/MM3 (ref 140–450)
PMV BLD AUTO: 9.5 FL (ref 6–12)
POTASSIUM SERPL-SCNC: 4.6 MMOL/L (ref 3.5–5.2)
RBC # BLD AUTO: 4.66 10*6/MM3 (ref 3.77–5.28)
SODIUM SERPL-SCNC: 136 MMOL/L (ref 136–145)
WBC NRBC COR # BLD: 13.71 10*3/MM3 (ref 3.4–10.8)

## 2022-08-22 PROCEDURE — 99232 SBSQ HOSP IP/OBS MODERATE 35: CPT | Performed by: INTERNAL MEDICINE

## 2022-08-22 PROCEDURE — B2111ZZ FLUOROSCOPY OF MULTIPLE CORONARY ARTERIES USING LOW OSMOLAR CONTRAST: ICD-10-PCS | Performed by: INTERNAL MEDICINE

## 2022-08-22 PROCEDURE — 4A023N7 MEASUREMENT OF CARDIAC SAMPLING AND PRESSURE, LEFT HEART, PERCUTANEOUS APPROACH: ICD-10-PCS | Performed by: INTERNAL MEDICINE

## 2022-08-22 PROCEDURE — 93458 L HRT ARTERY/VENTRICLE ANGIO: CPT | Performed by: INTERNAL MEDICINE

## 2022-08-22 PROCEDURE — 25010000002 ENOXAPARIN PER 10 MG: Performed by: STUDENT IN AN ORGANIZED HEALTH CARE EDUCATION/TRAINING PROGRAM

## 2022-08-22 PROCEDURE — 25010000002 MORPHINE PER 10 MG: Performed by: INTERNAL MEDICINE

## 2022-08-22 PROCEDURE — 80048 BASIC METABOLIC PNL TOTAL CA: CPT | Performed by: INTERNAL MEDICINE

## 2022-08-22 PROCEDURE — 0 IOPAMIDOL PER 1 ML: Performed by: INTERNAL MEDICINE

## 2022-08-22 PROCEDURE — 25010000002 MIDAZOLAM PER 1 MG: Performed by: INTERNAL MEDICINE

## 2022-08-22 PROCEDURE — C1769 GUIDE WIRE: HCPCS | Performed by: INTERNAL MEDICINE

## 2022-08-22 PROCEDURE — 85025 COMPLETE CBC W/AUTO DIFF WBC: CPT | Performed by: INTERNAL MEDICINE

## 2022-08-22 PROCEDURE — 93005 ELECTROCARDIOGRAM TRACING: CPT | Performed by: INTERNAL MEDICINE

## 2022-08-22 PROCEDURE — C1894 INTRO/SHEATH, NON-LASER: HCPCS | Performed by: INTERNAL MEDICINE

## 2022-08-22 PROCEDURE — 25010000002 FENTANYL CITRATE (PF) 50 MCG/ML SOLUTION: Performed by: INTERNAL MEDICINE

## 2022-08-22 RX ORDER — ALPRAZOLAM 0.25 MG/1
0.25 TABLET ORAL 3 TIMES DAILY PRN
Status: ACTIVE | OUTPATIENT
Start: 2022-08-22 | End: 2022-08-23

## 2022-08-22 RX ORDER — SODIUM CHLORIDE 9 MG/ML
250 INJECTION, SOLUTION INTRAVENOUS ONCE AS NEEDED
Status: DISCONTINUED | OUTPATIENT
Start: 2022-08-22 | End: 2022-08-24 | Stop reason: HOSPADM

## 2022-08-22 RX ORDER — ACETAMINOPHEN 325 MG/1
650 TABLET ORAL EVERY 4 HOURS PRN
Status: DISCONTINUED | OUTPATIENT
Start: 2022-08-22 | End: 2022-08-24 | Stop reason: HOSPADM

## 2022-08-22 RX ORDER — SODIUM CHLORIDE 9 MG/ML
100 INJECTION, SOLUTION INTRAVENOUS CONTINUOUS
Status: ACTIVE | OUTPATIENT
Start: 2022-08-22 | End: 2022-08-22

## 2022-08-22 RX ORDER — LIDOCAINE HYDROCHLORIDE 10 MG/ML
INJECTION, SOLUTION EPIDURAL; INFILTRATION; INTRACAUDAL; PERINEURAL AS NEEDED
Status: DISCONTINUED | OUTPATIENT
Start: 2022-08-22 | End: 2022-08-22 | Stop reason: HOSPADM

## 2022-08-22 RX ORDER — NALOXONE HCL 0.4 MG/ML
0.4 VIAL (ML) INJECTION
Status: DISCONTINUED | OUTPATIENT
Start: 2022-08-22 | End: 2022-08-24 | Stop reason: HOSPADM

## 2022-08-22 RX ORDER — MIDAZOLAM HYDROCHLORIDE 1 MG/ML
INJECTION INTRAMUSCULAR; INTRAVENOUS AS NEEDED
Status: DISCONTINUED | OUTPATIENT
Start: 2022-08-22 | End: 2022-08-22 | Stop reason: HOSPADM

## 2022-08-22 RX ORDER — MORPHINE SULFATE 2 MG/ML
1 INJECTION, SOLUTION INTRAMUSCULAR; INTRAVENOUS EVERY 4 HOURS PRN
Status: DISPENSED | OUTPATIENT
Start: 2022-08-22 | End: 2022-08-23

## 2022-08-22 RX ORDER — FENTANYL CITRATE 50 UG/ML
INJECTION, SOLUTION INTRAMUSCULAR; INTRAVENOUS AS NEEDED
Status: DISCONTINUED | OUTPATIENT
Start: 2022-08-22 | End: 2022-08-22 | Stop reason: HOSPADM

## 2022-08-22 RX ORDER — HYDROCODONE BITARTRATE AND ACETAMINOPHEN 7.5; 325 MG/1; MG/1
1 TABLET ORAL EVERY 4 HOURS PRN
Status: DISCONTINUED | OUTPATIENT
Start: 2022-08-22 | End: 2022-08-24 | Stop reason: HOSPADM

## 2022-08-22 RX ADMIN — MORPHINE SULFATE 1 MG: 2 INJECTION, SOLUTION INTRAMUSCULAR; INTRAVENOUS at 18:38

## 2022-08-22 RX ADMIN — Medication 10 ML: at 08:23

## 2022-08-22 RX ADMIN — Medication 10 ML: at 21:19

## 2022-08-22 RX ADMIN — SACUBITRIL AND VALSARTAN 0.5 TABLET: 24; 26 TABLET, FILM COATED ORAL at 21:18

## 2022-08-22 RX ADMIN — SACUBITRIL AND VALSARTAN 0.5 TABLET: 24; 26 TABLET, FILM COATED ORAL at 08:23

## 2022-08-22 RX ADMIN — BUMETANIDE 0.5 MG: 1 TABLET ORAL at 08:22

## 2022-08-22 RX ADMIN — DIGOXIN 250 MCG: 250 TABLET ORAL at 11:50

## 2022-08-22 RX ADMIN — ENOXAPARIN SODIUM 40 MG: 40 INJECTION SUBCUTANEOUS at 08:23

## 2022-08-22 NOTE — PAYOR COMM NOTE
"Molina, April (37 y.o. Female)             Date of Birth   1985    Social Security Number       Address   [address] [place] [address] [place] [address]    Home Phone   [phone]    MRN   [id number]       Jainism   None    Marital Status   Single                            Admission Date   22    Admission Type   Emergency    Admitting Provider   Danica Hernandez DO    Attending Provider   Danica Hernandez DO    Department, Room/Bed   [facility] 4G, S451/1       Discharge Date       Discharge Disposition       Discharge Destination                               Attending Provider: Danica Hernandez DO    Allergies: Amoxicillin    Isolation: None   Infection: None   Code Status: CPR   Advance Care Planning Activity    Ht: 167.6 cm (66\")   Wt: 88.9 kg (196 lb)    Admission Cmt: None   Principal Problem: Acute systolic heart failure (HCC) [I50.21]                 Active Insurance as of 2022     Primary Coverage     Payor Plan Insurance Group Employer/Plan Group    [facility] MEDICAID      Payor Plan Address Payor Plan Phone Number Payor Plan Fax Number Effective Dates    [address] [address] [phone]  2022 - None Entered    [place] [address]       Subscriber Name Subscriber Birth Date Member ID       [id number]                 Emergency Contacts      (Rel.) Home Phone Work Phone Mobile Phone    EDGAR SHEPPARD (Sister) -- -- [phone]            Parnell: NPI [id number] Tax ID [id number]  Insurance Information                [facility] MEDICAID Phone: [phone]    Subscriber: Molina, April Subscriber#: [id number]    Group#: -- Precert#: --             History & Physical      Azul Qurehsi MD at 22 2220              [facility] Medicine Services  HISTORY AND PHYSICAL    Patient Name: Herminia Molina  : 1985  MRN: [id number]  Primary Care Physician: " Provider, No Known  Date of admission: 8/19/2022      Subjective   Subjective     Chief Complaint:  Dyspnea    HPI:  Herminia Hernandez is a 37 y.o. female with a remote history of what she says is COPD diagnosed as a child, who is presenting with increasing dyspnea and lower extremity swelling.  She states she was diagnosed with COPD when she was 4 secondary to secondhand smoke.  For the past several weeks she has been having increasing dyspnea cough and swelling, she was told that she has bronchitis and was given steroids and breathing treatments without relief.  She is presenting today because the dyspnea is getting worse and worse.      Review of Systems   Gen- No fevers, chills  CV- No chest pain, palpitations  Resp- +dyspnea  GI- No N/V/D, abd pain      All other systems reviewed and are negative.     Personal History     Past Medical History:   Diagnosis Date   • Asthma    • COPD (chronic obstructive pulmonary disease) (HCC)              No past surgical history on file.    Family History: Tobacco abuse, COPD family history is not on file. Otherwise pertinent FHx was reviewed and unremarkable.     Social History:  reports that she is a non-smoker but has been exposed to tobacco smoke. She has never used smokeless tobacco. She reports that she does not drink alcohol and does not use drugs.  Social History     Social History Narrative   • Not on file       Medications:  Available home medication information reviewed.  Medications Prior to Admission   Medication Sig Dispense Refill Last Dose   • albuterol sulfate  (90 Base) MCG/ACT inhaler Inhale 2 puffs 4 (Four) Times a Day. 18 g 0    • dextromethorphan-guaifenesin (ROBITUSSIN-DM)  MG/5ML syrup Take 5 mL by mouth Every 12 (Twelve) Hours.          Allergies   Allergen Reactions   • Amoxicillin Swelling       Objective   Objective     Vital Signs:   Temp:  [97.9 °F (36.6 °C)-98.1 °F (36.7 °C)] 98.1 °F (36.7 °C)  Heart Rate:  [107-121] 113  Resp:  [16-20]  19  BP: (114-127)/(75-99) 117/75       Physical Exam   Constitutional: Appears to be in mild distress, awake alert  HENT: NCAT, mucous membranes moist  Respiratory: Diffuse crackles bilaterally, appears tachypneic  Cardiovascular: Tachycardic, no murmurs, rubs, or gallops  Gastrointestinal: Positive bowel sounds, soft, nontender, nondistended  Musculoskeletal: 1+ bilateral ankle edema  Psychiatric: Appropriate affect, cooperative  Neurologic: Oriented x 3, strength symmetric in all extremities, Cranial Nerves grossly intact to confrontation, speech clear  Skin: No rashes      Result Review:  I have personally reviewed the results from the time of this admission to 8/19/2022 22:29 EDT and agree with these findings:  [x]  Laboratory list / accordion  [x]  Microbiology  [x]  Radiology  []  EKG/Telemetry   []  Cardiology/Vascular   []  Pathology  []  Old records  []  Other:  Most notable findings include:   Pulmonary edema, bilateral pleural effusions on CTA  Elevated proBNP  Echo performed shows EF of less than 20%        LAB RESULTS:      Lab 08/19/22  1050   WBC 11.23*   HEMOGLOBIN 12.3   HEMATOCRIT 38.0   PLATELETS 447   NEUTROS ABS 5.97   IMMATURE GRANS (ABS) 0.04   LYMPHS ABS 3.69*   MONOS ABS 0.99*   EOS ABS 0.46*   MCV 96.0         Lab 08/19/22  1050   SODIUM 140   POTASSIUM 4.4   CHLORIDE 107   CO2 26.0   ANION GAP 7.0   BUN 13   CREATININE 0.63   EGFR 116.6   GLUCOSE 108*   CALCIUM 8.9   TSH 3.230         Lab 08/19/22  1050   TOTAL PROTEIN 6.5   ALBUMIN 3.40*   GLOBULIN 3.1   ALT (SGPT) 48*   AST (SGOT) 56*   BILIRUBIN 0.3   ALK PHOS 88         Lab 08/19/22  1050   PROBNP 5,156.0*   TROPONIN T <0.010                     Microbiology Results (last 10 days)     Procedure Component Value - Date/Time    COVID PRE-OP / PRE-PROCEDURE SCREENING ORDER (NO ISOLATION) - Swab, Nasopharynx [[id number]]  (Normal) Collected: 08/19/22 1235    Lab Status: Final result Specimen: Swab from Nasopharynx Updated: 08/19/22 5560     Narrative:      The following orders were created for panel order COVID PRE-OP / PRE-PROCEDURE SCREENING ORDER (NO ISOLATION) - Swab, Nasopharynx.  Procedure                               Abnormality         Status                     ---------                               -----------         ------                     Respiratory Panel PCR w/...[[id number]]  Normal              Final result                 Please view results for these tests on the individual orders.    Respiratory Panel PCR w/COVID-19(SARS-CoV-2) LILLIAN/FABRICE/LIVIA/PAD/COR/MAD/CRISTINA In-House, NP Swab in UTM/VTM, 3-4 HR TAT - Swab, Nasopharynx [[id number]]  (Normal) Collected: 08/19/22 1235    Lab Status: Final result Specimen: Swab from Nasopharynx Updated: 08/19/22 1340     ADENOVIRUS, PCR Not Detected     Coronavirus 229E Not Detected     Coronavirus HKU1 Not Detected     Coronavirus NL63 Not Detected     Coronavirus OC43 Not Detected     COVID19 Not Detected     Human Metapneumovirus Not Detected     Human Rhinovirus/Enterovirus Not Detected     Influenza A PCR Not Detected     Influenza B PCR Not Detected     Parainfluenza Virus 1 Not Detected     Parainfluenza Virus 2 Not Detected     Parainfluenza Virus 3 Not Detected     Parainfluenza Virus 4 Not Detected     RSV, PCR Not Detected     Bordetella pertussis pcr Not Detected     Bordetella parapertussis PCR Not Detected     Chlamydophila pneumoniae PCR Not Detected     Mycoplasma pneumo by PCR Not Detected    Narrative:      In the setting of a positive respiratory panel with a viral infection PLUS a negative procalcitonin without other underlying concern for bacterial infection, consider observing off antibiotics or discontinuation of antibiotics and continue supportive care. If the respiratory panel is positive for atypical bacterial infection (Bordetella pertussis, Chlamydophila pneumoniae, or Mycoplasma pneumoniae), consider antibiotic de-escalation to target atypical bacterial infection.           Adult Transthoracic Echo Complete W/ Cont if Necessary Per Protocol    Result Date: 8/19/2022  · Left ventricular ejection fraction appears to be less than 20%. Left ventricular systolic function is severely decreased. · Left ventricular diastolic function is consistent with (grade II w/high LAP) pseudonormalization. · Severe mitral valve regurgitation is present. · The left ventricular cavity is moderately dilated. · Left atrial volume is moderately increased. · Estimated right ventricular systolic pressure from tricuspid regurgitation is moderately elevated (45-55 mmHg). Calculated right ventricular systolic pressure from tricuspid regurgitation is 51 mmHg. · Mildly reduced right ventricular systolic function noted.      XR Chest 1 View    Result Date: 8/19/2022  XR CHEST 1 VW-  Date of Exam: 8/19/2022 10:50 AM  Indication: SOA triage protocol.  Comparison Exams: August 4, 2022  Technique: Single AP chest radiograph  FINDINGS: There is a mild opacity in the right lower lung. The heart and mediastinal contours appear normal. The pulmonary vasculature appears normal. The osseous structures appear intact.      Impression: Mild opacity within right lower lung, which could reflect atelectasis or pneumonia.  This report was finalized on 8/19/2022 11:15 AM by Onur Baires MD.      CT Angiogram Chest    Result Date: 8/19/2022  DATE OF EXAM: 8/19/2022 2:30 PM  PROCEDURE: CT ANGIOGRAM CHEST-  INDICATIONS: SOA, multiple physician visists  COMPARISON: No comparisons available.  TECHNIQUE: Contiguous axial imaging was obtained from the thoracic inlet through the upper abdomen following the intravenous administration of 100 mL of Isovue 370. Reconstructed coronal and sagittal images were also obtained. Automated exposure control and iterative reconstruction methods were used.  The radiation dose reduction device was turned on for each scan per the ALARA (As Low as Reasonably Achievable) protocol.  FINDINGS: There is  no pathologic axillary adenopathy or other worrisome body wall soft tissue finding in the chest. No acute findings are present in the partially characterized upper abdomen. There are are moderate to large bilateral pleural effusions. There is no significant pericardial effusion. There is no pathologic mediastinal lymphadenopathy. Nonaneurysmal thoracic aorta. Evaluation of the osseous structures demonstrates no evidence of acute fracture or aggressive osseous lesion. Evaluation of the lung fields demonstrates diffuse interlobular septal thickening with intervening groundglass opacity consistent with pulmonary edema. There is no focal consolidation or suspicious pulmonary nodularity. The pulmonary arteries are well-opacified, without focal filling defect concerning for acute pulmonary embolus.      Impression: Moderate to severe pulmonary edema with moderate to large bilateral pleural effusions present. There is no acute pulmonary embolus.  This report was finalized on 8/19/2022 2:44 PM by Jamison Hicks.        Results for orders placed during the hospital encounter of 08/19/22    Adult Transthoracic Echo Complete W/ Cont if Necessary Per Protocol    Interpretation Summary  · Left ventricular ejection fraction appears to be less than 20%. Left ventricular systolic function is severely decreased.  · Left ventricular diastolic function is consistent with (grade II w/high LAP) pseudonormalization.  · Severe mitral valve regurgitation is present.  · The left ventricular cavity is moderately dilated.  · Left atrial volume is moderately increased.  · Estimated right ventricular systolic pressure from tricuspid regurgitation is moderately elevated (45-55 mmHg). Calculated right ventricular systolic pressure from tricuspid regurgitation is 51 mmHg.  · Mildly reduced right ventricular systolic function noted.      Assessment & Plan   Assessment & Plan     Active Hospital Problems    Diagnosis  POA   • Respiratory distress  [R06.03]  Yes   • Acute systolic heart failure (HCC) [I50.21]  Unknown   • Volume overload [E87.70]  Unknown   • Nonrheumatic mitral valve regurgitation [I34.0]  Unknown       Herminia Hernandez is a 37-year-old female who is presenting with worsening dyspnea on and swelling, found to have imaging evidence of pulmonary edema and pleural effusions and clinically volume overloaded.  Echo performed in the ED showed EF of 20%    Acute respiratory failure  Acute decompensated heart failure with reduced ejection fraction  Volume overload  - IV diuresis  - Echo shows severely decreased left ventricular systolic function, severe mitral regurgitation  - Cardiology consult  - Daily weights, strict I's and O's  --trend troponin  --telemetry    DVT prophylaxis:  lovenox      CODE STATUS:  full  Code Status and Medical Interventions:   Ordered at: 08/19/22 1716     Level Of Support Discussed With:    Patient     Code Status (Patient has no pulse and is not breathing):    CPR (Attempt to Resuscitate)     Medical Interventions (Patient has pulse or is breathing):    Full Support         Azul Qureshi MD  08/19/22      Electronically signed by Azul Qureshi MD at 08/19/22 4739       Emergency Department Notes    No notes of this type exist for this encounter.         Vital Signs (last day)     Date/Time Temp Temp src Pulse Resp BP Patient Position SpO2    08/22/22 0600 -- -- 88 -- -- -- 98    08/22/22 0500 -- -- 90 -- -- -- 99    08/22/22 0440 98.2 (36.8) Oral 92 18 112/72 Lying 95    08/22/22 0400 -- -- 89 -- -- -- 97    08/22/22 0300 -- -- 96 -- -- -- 98    08/22/22 0200 -- -- 94 -- -- -- 99    08/22/22 0100 -- -- 99 -- -- -- 97    08/22/22 0000 98.3 (36.8) Oral 103 18 106/64 Lying 96    08/21/22 2300 -- -- 114 -- -- -- 98    08/21/22 2200 -- -- 104 -- -- -- 97    08/21/22 2127 -- -- 107 -- 96/66 -- --    08/21/22 2100 -- -- 103 -- -- -- 99    08/21/22 2000 -- -- 104 -- -- -- 97    08/21/22 1830 98.3 (36.8) Oral 103 -- 111/70 Sitting 97     08/21/22 1700 -- -- 106 -- -- -- 96    08/21/22 1618 98.3 (36.8) Oral -- 19 -- Lying --    08/21/22 1615 -- -- 104 -- 96/64 -- 99    08/21/22 1500 -- -- 106 -- -- -- 98    08/21/22 1450 -- -- 101 -- -- -- 97    08/21/22 1300 -- -- 116 -- -- -- 98    08/21/22 1234 -- -- 107 -- 114/75 Sitting --    08/21/22 1100 -- -- 107 -- -- -- 96    08/21/22 1015 -- -- 111 -- -- -- 97    08/21/22 0920 -- -- 106 -- 106/60 -- --    08/21/22 0900 -- -- 104 -- -- -- 100    08/21/22 0858 -- -- 106 -- -- -- 100    08/21/22 0744 97.7 (36.5) Oral 109 19 97/72 Lying 99    08/21/22 0700 -- -- 106 -- -- -- 96    08/21/22 0610 -- -- 104 -- -- -- 97    08/21/22 0500 -- -- 97 -- -- -- 94    08/21/22 0400 -- -- 109 -- -- -- 96    08/21/22 0300 -- -- 104 -- -- -- 98    08/21/22 0200 -- -- 92 -- -- -- 100    08/21/22 0100 -- -- 107 -- -- -- 97    08/21/22 0005 -- -- 126 -- -- -- 98            Current Facility-Administered Medications   Medication Dose Route Frequency Provider Last Rate Last Admin   • albuterol (PROVENTIL) nebulizer solution 0.083% 2.5 mg/3mL  2.5 mg Nebulization Q6H PRN Azul Qureshi MD       • benzonatate (TESSALON) capsule 200 mg  200 mg Oral TID PRN Azul Qureshi MD       • bumetanide (BUMEX) tablet 0.5 mg  0.5 mg Oral Daily Orquidea Tom MD       • digoxin (LANOXIN) tablet 250 mcg  250 mcg Oral Daily Yusra Trevizo APRN   250 mcg at 08/21/22 1234   • Enoxaparin Sodium (LOVENOX) syringe 40 mg  40 mg Subcutaneous Daily Azul Qureshi MD   40 mg at 08/21/22 0921   • morphine injection 2 mg  2 mg Intravenous Q15 Min PRN Moshe Mei DO   2 mg at 08/19/22 2110   • Pharmacy Consult - MTM   Does not apply Daily Chrissy Levy, PharmD       • sacubitril-valsartan (ENTRESTO) 24-26 MG tablet 0.5 tablet  0.5 tablet Oral Q12H Yusra Trevizo APRN   0.5 tablet at 08/21/22 2127   • sodium chloride 0.9 % flush 10 mL  10 mL Intravenous PRN Emergency, Triage Protocol, MD       • sodium chloride 0.9 % flush 10 mL  10  mL Intravenous Q12H Azul Qureshi MD   10 mL at 08/21/22 2129   • sodium chloride 0.9 % flush 10 mL  10 mL Intravenous PRN Azul Qureshi MD           Lab Results (last 24 hours)     Procedure Component Value Units Date/Time    Basic Metabolic Panel [[id number]]  (Normal) Collected: 08/22/22 0529    Specimen: Blood Updated: 08/22/22 0713     Glucose 90 mg/dL      BUN 15 mg/dL      Creatinine 0.76 mg/dL      Sodium 136 mmol/L      Potassium 4.6 mmol/L      Chloride 101 mmol/L      CO2 26.0 mmol/L      Calcium 8.8 mg/dL      BUN/Creatinine Ratio 19.7     Anion Gap 9.0 mmol/L      eGFR 103.0 mL/min/1.73      Comment: National Kidney Foundation and American Society of Nephrology (ASN) Task Force recommended calculation based on the Chronic Kidney Disease Epidemiology Collaboration (CKD-EPI) equation refit without adjustment for race.       Narrative:      GFR Normal >60  Chronic Kidney Disease <60  Kidney Failure <15      CBC & Differential [[id number]]  (Abnormal) Collected: 08/22/22 0529    Specimen: Blood Updated: 08/22/22 0622    Narrative:      The following orders were created for panel order CBC & Differential.  Procedure                               Abnormality         Status                     ---------                               -----------         ------                     CBC Auto Differential[[id number]]        Abnormal            Final result                 Please view results for these tests on the individual orders.    CBC Auto Differential [[id number]]  (Abnormal) Collected: 08/22/22 0529    Specimen: Blood Updated: 08/22/22 0622     WBC 13.71 10*3/mm3      RBC 4.66 10*6/mm3      Hemoglobin 14.2 g/dL      Hematocrit 43.7 %      MCV 93.8 fL      MCH 30.5 pg      MCHC 32.5 g/dL      RDW 13.8 %      RDW-SD 47.3 fl      MPV 9.5 fL      Platelets 526 10*3/mm3      Neutrophil % 49.4 %      Lymphocyte % 34.1 %      Monocyte % 10.4 %      Eosinophil % 4.9 %      Basophil % 0.8 %      Immature Grans % 0.4 %       Neutrophils, Absolute 6.77 10*3/mm3      Lymphocytes, Absolute 4.68 10*3/mm3      Monocytes, Absolute 1.42 10*3/mm3      Eosinophils, Absolute 0.67 10*3/mm3      Basophils, Absolute 0.11 10*3/mm3      Immature Grans, Absolute 0.06 10*3/mm3      nRBC 0.0 /100 WBC         Imaging Results (Last 24 Hours)     ** No results found for the last 24 hours. **        Orders (last 24 hrs)      Start     Ordered    08/22/22 0900  bumetanide (BUMEX) tablet 0.5 mg  Daily         08/21/22 1011    08/22/22 0600  Basic Metabolic Panel  Morning Draw         08/21/22 1700    08/22/22 0600  CBC & Differential  Morning Draw         08/21/22 1700    08/22/22 0600  CBC Auto Differential  PROCEDURE ONCE         08/21/22 2205    08/22/22 0001  NPO Diet NPO Type: Sips with meds  After Breakfast (DIET)         08/21/22 1013    08/21/22 1100  Strict Intake & Output  Every Hour       08/21/22 1003    08/21/22 1014  Obtain Informed Consent  Once         08/21/22 1013    08/21/22 1013  Case Request Cath Lab: Left Heart Cath  Once         08/21/22 1013    08/20/22 1345  digoxin (LANOXIN) tablet 250 mcg  Daily Digoxin         08/20/22 1247    08/20/22 1345  sacubitril-valsartan (ENTRESTO) 24-26 MG tablet 0.5 tablet  Every 12 Hours Scheduled         08/20/22 1247    08/20/22 1245  Pharmacy Consult - [facility]  Daily         08/20/22 1154    08/20/22 0900  furosemide (LASIX) injection 40 mg  Daily,   Status:  Discontinued         08/19/22 2227 08/19/22 2100  sodium chloride 0.9 % flush 10 mL  Every 12 Hours Scheduled         08/19/22 1714 08/19/22 2000  Vital Signs  Every 4 Hours       08/19/22 1714 08/19/22 1800  Oral Care  2 Times Daily       08/19/22 1714 08/19/22 1800  Enoxaparin Sodium (LOVENOX) syringe 40 mg  Daily         08/19/22 1714    08/19/22 1800  Strict Intake & Output  Every Hour       08/19/22 1714 08/19/22 1715  Daily Weights  Daily       08/19/22 1714    08/19/22 1711  Intake & Output  Every Shift       08/19/22 1714     08/19/22 1710  sodium chloride 0.9 % flush 10 mL  As Needed         08/19/22 1714    08/19/22 1709  benzonatate (TESSALON) capsule 200 mg  3 Times Daily PRN         08/19/22 1714    08/19/22 1709  albuterol (PROVENTIL) nebulizer solution 0.083% 2.5 mg/3mL  Every 6 Hours PRN         08/19/22 1714    08/19/22 1235  morphine injection 2 mg  Every 15 Minutes PRN         08/19/22 1235    08/19/22 1040  sodium chloride 0.9 % flush 10 mL  As Needed         08/19/22 1040    --  promethazine-dextromethorphan (PROMETHAZINE-DM) 6.25-15 MG/5ML solution  4 Times Daily PRN         08/20/22 1737                   Physician Progress Notes (last 24 hours)      Orquidea Tom MD at 08/21/22 1001          [facility] Cardiology Daily Note       LOS: 2 days   Patient Care Team:  Provider, No Known as PCP - General    Chief Complaint: Cardiomyopathy/systolic heart failure    Subjective     Subjective: Heart rates have been near 100.  99% saturated on 2 L.  Blood pressures have been marginal.  Intake and output has not been accurately charted.  Drug screen positive for methamphetamine and opiates.  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  She has not used any other illicit drugs.    Review of Systems:   As above.    Medications:  digoxin, 250 mcg, Oral, Daily  enoxaparin, 40 mg, Subcutaneous, Daily  furosemide, 40 mg, Intravenous, Daily  pharmacy consult - [place], , Does not apply, Daily  sacubitril-valsartan, 0.5 tablet, Oral, Q12H  sodium chloride, 10 mL, Intravenous, Q12H        Objective     Vital Sign Min/Max for last 24 hours  Temp  Min: 97.7 °F (36.5 °C)  Max: 98.3 °F (36.8 °C)   BP  Min: 97/72  Max: 110/80   Pulse  Min: 92  Max: 126   Resp  Min: 18  Max: 19   SpO2  Min: 94 %  Max: 100 %   Flow (L/min)  Min: 2  Max: 2   Weight  Min: 88 kg (194 lb)  Max: 88 kg (194 lb)      Intake/Output Summary (Last 24 hours) at 8/21/2022  "1001  Last data filed at 8/20/2022 2100  Gross per 24 hour   Intake --   Output 1200 ml   Net -1200 ml        Flowsheet Rows    Flowsheet Row First Filed Value   Admission Height 167.6 cm (66\") Documented at 08/19/2022 1025   Admission Weight 90.3 kg (199 lb) Documented at 08/19/2022 1025          Physical Exam:    General: Alert and oriented.   Cardiovascular: Heart has a nondisplaced focal PMI.  Tachycardic regular rate and rhythm without murmur heard  Lungs: Clear without rales or wheezes. Equal expansion is noted.   Abdomen: Soft, nontender.  Extremities: Show no edema.   Skin: warm and dry.     Results Review:    I reviewed the patient's new clinical results.  EKG:  Tele: Sinus tach    Labs:    Results from last 7 days   Lab Units 08/21/22  0549 08/20/22  0612 08/19/22  1050   SODIUM mmol/L 139 136 140   POTASSIUM mmol/L 4.0 4.7 4.4   CHLORIDE mmol/L 103 100 107   CO2 mmol/L 27.0 27.0 26.0   BUN mg/dL 18 16 13   CREATININE mg/dL 0.79 0.85 0.63   CALCIUM mg/dL 8.8 8.7 8.9   BILIRUBIN mg/dL  --  0.3 0.3   ALK PHOS U/L  --  93 88   ALT (SGPT) U/L  --  45* 48*   AST (SGOT) U/L  --  50* 56*   GLUCOSE mg/dL 107* 144* 108*     Results from last 7 days   Lab Units 08/21/22  0549 08/20/22  0612 08/19/22  1050   WBC 10*3/mm3 14.87* 18.06* 11.23*   HEMOGLOBIN g/dL 13.9 12.8 12.3   HEMATOCRIT % 42.8 37.9 38.0   PLATELETS 10*3/mm3 555* 539* 447     Lab Results   Component Value Date    TROPONINT <0.010 08/20/2022    TROPONINT <0.010 08/19/2022     No results found for: CHOL  No results found for: TRIG  No results found for: HDL  No components found for: LDLCALC  No results found for: INR, PROTIME      Ejection Fraction:    Assessment   Assessment:    1. Systolic heart failure, acute on chronic   1. Echo 8/19/2022, RDS: EF 20%, grade 2 diastolic dysfunction, severe MR, LV moderately dilated, LA moderately dilated, RVSP 45 to 55 mmHg, mildly reduced right ventricular systolic function.  2. Previous methamphetamine " use  2. Mitral regurgitation  3. Reported history of COPD  asthma         Plan:    1. Digoxin 250 mcg daily  2. Begin Entresto 24-26 1/2 tablet twice daily  3. Low-sodium diet  4. Strict I's and O's  5. Daily weights  6. Start oral Bumex   7. Left heart catheterization with possible intervention using the right femoral artery on Monday (the ulnar pulses are not adequate for radial access) the patient understands risks and benefits and agrees to proceed.      Orquidea Tom MD  22  10:01 EDT            Electronically signed by Orquidea Tom MD at 22 1011     Francia Juan MD at 22 0833              [facility] Medicine Services  PROGRESS NOTE    Patient Name: Herminia Hernandez  : 1985  MRN: [id number]    Date of Admission: 2022  Primary Care Physician: Provider, No Known    Subjective   Subjective     CC:  F/U CHF    HPI:  Doing well today.  Urinating frequently and dyspnea improving.    ROS:  Gen- No fevers  Resp- As above    Objective   Objective     Vital Signs:   Temp:  [97.7 °F (36.5 °C)-98.3 °F (36.8 °C)] 97.7 °F (36.5 °C)  Heart Rate:  [] 109  Resp:  [18-19] 19  BP: ()/(72-90) 97/72  Flow (L/min):  [2] 2     Physical Exam:  Constitutional: No acute distress, awake, alert, sitting up in bed  HENT: NCAT, mucous membranes moist  Respiratory: Clear to auscultation bilaterally, respiratory effort normal on nasal cannula  Cardiovascular: RRR  Gastrointestinal: Positive bowel sounds, soft, nontender, nondistended  Musculoskeletal: Trace bilateral ankle edema  Psychiatric: Appropriate affect, cooperative  Neurologic: Speech clear and fluent  Skin: No rashes on exposed surfaces    Exam unchanged from     Results Reviewed:  LAB RESULTS:      Lab 22  0549 22  0612 22  1050   WBC 14.87* 18.06* 11.23*   HEMOGLOBIN 13.9 12.8 12.3   HEMATOCRIT 42.8 37.9 38.0   PLATELETS 555* 539* 447   NEUTROS ABS 8.21* 14.57* 5.97    IMMATURE GRANS (ABS) 0.06* 0.12* 0.04   LYMPHS ABS 4.94* 2.15 3.69*   MONOS ABS 1.06* 1.17* 0.99*   EOS ABS 0.49* 0.01 0.46*   MCV 93.9 93.1 96.0   PROCALCITONIN  --  0.03  --          Lab 08/21/22  0549 08/20/22  0612 08/19/22  1050   SODIUM 139 136 140   POTASSIUM 4.0 4.7 4.4   CHLORIDE 103 100 107   CO2 27.0 27.0 26.0   ANION GAP 9.0 9.0 7.0   BUN 18 16 13   CREATININE 0.79 0.85 0.63   EGFR 98.3 90.1 116.6   GLUCOSE 107* 144* 108*   CALCIUM 8.8 8.7 8.9   TSH  --   --  3.230         Lab 08/20/22  0612 08/19/22  1050   TOTAL PROTEIN 6.7 6.5   ALBUMIN 3.10* 3.40*   GLOBULIN 3.6 3.1   ALT (SGPT) 45* 48*   AST (SGOT) 50* 56*   BILIRUBIN 0.3 0.3   ALK PHOS 93 88         Lab 08/20/22  0612 08/19/22  1050   PROBNP  --  5,156.0*   TROPONIN T <0.010 <0.010                 Brief Urine Lab Results     None          Microbiology Results Abnormal     Procedure Component Value - Date/Time    COVID PRE-OP / PRE-PROCEDURE SCREENING ORDER (NO ISOLATION) - Swab, Nasopharynx [[id number]]  (Normal) Collected: 08/19/22 1235    Lab Status: Final result Specimen: Swab from Nasopharynx Updated: 08/19/22 1340    Narrative:      The following orders were created for panel order COVID PRE-OP / PRE-PROCEDURE SCREENING ORDER (NO ISOLATION) - Swab, Nasopharynx.  Procedure                               Abnormality         Status                     ---------                               -----------         ------                     Respiratory Panel PCR w/...[[id number]]  Normal              Final result                 Please view results for these tests on the individual orders.    Respiratory Panel PCR w/COVID-19(SARS-CoV-2) LILLIAN/FABRICE/LIVIA/PAD/COR/MAD/CRISTINA In-House, NP Swab in [facility], 3-4 HR TAT - Swab, Nasopharynx [[id number]]  (Normal) Collected: 08/19/22 1235    Lab Status: Final result Specimen: Swab from Nasopharynx Updated: 08/19/22 1340     ADENOVIRUS, PCR Not Detected     Coronavirus 229E Not Detected     Coronavirus HKU1 Not Detected      Coronavirus NL63 Not Detected     Coronavirus OC43 Not Detected     COVID19 Not Detected     Human Metapneumovirus Not Detected     Human Rhinovirus/Enterovirus Not Detected     Influenza A PCR Not Detected     Influenza B PCR Not Detected     Parainfluenza Virus 1 Not Detected     Parainfluenza Virus 2 Not Detected     Parainfluenza Virus 3 Not Detected     Parainfluenza Virus 4 Not Detected     RSV, PCR Not Detected     Bordetella pertussis pcr Not Detected     Bordetella parapertussis PCR Not Detected     Chlamydophila pneumoniae PCR Not Detected     Mycoplasma pneumo by PCR Not Detected    Narrative:      In the setting of a positive respiratory panel with a viral infection PLUS a negative procalcitonin without other underlying concern for bacterial infection, consider observing off antibiotics or discontinuation of antibiotics and continue supportive care. If the respiratory panel is positive for atypical bacterial infection (Bordetella pertussis, Chlamydophila pneumoniae, or Mycoplasma pneumoniae), consider antibiotic de-escalation to target atypical bacterial infection.          Adult Transthoracic Echo Complete W/ Cont if Necessary Per Protocol    Result Date: 8/19/2022  · Left ventricular ejection fraction appears to be less than 20%. Left ventricular systolic function is severely decreased. · Left ventricular diastolic function is consistent with (grade II w/high LAP) pseudonormalization. · Severe mitral valve regurgitation is present. · The left ventricular cavity is moderately dilated. · Left atrial volume is moderately increased. · Estimated right ventricular systolic pressure from tricuspid regurgitation is moderately elevated (45-55 mmHg). Calculated right ventricular systolic pressure from tricuspid regurgitation is 51 mmHg. · Mildly reduced right ventricular systolic function noted.      XR Chest 1 View    Result Date: 8/19/2022  XR CHEST 1 VW-  Date of Exam: 8/19/2022 10:50 AM  Indication: SOA  triage protocol.  Comparison Exams: August 4, 2022  Technique: Single AP chest radiograph  FINDINGS: There is a mild opacity in the right lower lung. The heart and mediastinal contours appear normal. The pulmonary vasculature appears normal. The osseous structures appear intact.      Impression: Mild opacity within right lower lung, which could reflect atelectasis or pneumonia.  This report was finalized on 8/19/2022 11:15 AM by Onur Baires MD.      CT Angiogram Chest    Result Date: 8/19/2022  DATE OF EXAM: 8/19/2022 2:30 PM  PROCEDURE: CT ANGIOGRAM CHEST-  INDICATIONS: SOA, multiple physician visists  COMPARISON: No comparisons available.  TECHNIQUE: Contiguous axial imaging was obtained from the thoracic inlet through the upper abdomen following the intravenous administration of 100 mL of Isovue 370. Reconstructed coronal and sagittal images were also obtained. Automated exposure control and iterative reconstruction methods were used.  The radiation dose reduction device was turned on for each scan per the ALARA (As Low as Reasonably Achievable) protocol.  FINDINGS: There is no pathologic axillary adenopathy or other worrisome body wall soft tissue finding in the chest. No acute findings are present in the partially characterized upper abdomen. There are are moderate to large bilateral pleural effusions. There is no significant pericardial effusion. There is no pathologic mediastinal lymphadenopathy. Nonaneurysmal thoracic aorta. Evaluation of the osseous structures demonstrates no evidence of acute fracture or aggressive osseous lesion. Evaluation of the lung fields demonstrates diffuse interlobular septal thickening with intervening groundglass opacity consistent with pulmonary edema. There is no focal consolidation or suspicious pulmonary nodularity. The pulmonary arteries are well-opacified, without focal filling defect concerning for acute pulmonary embolus.      Impression: Moderate to severe pulmonary  edema with moderate to large bilateral pleural effusions present. There is no acute pulmonary embolus.  This report was finalized on 8/19/2022 2:44 PM by Jamison Hicks.        Results for orders placed during the hospital encounter of 08/19/22    Adult Transthoracic Echo Complete W/ Cont if Necessary Per Protocol    Interpretation Summary  · Left ventricular ejection fraction appears to be less than 20%. Left ventricular systolic function is severely decreased.  · Left ventricular diastolic function is consistent with (grade II w/high LAP) pseudonormalization.  · Severe mitral valve regurgitation is present.  · The left ventricular cavity is moderately dilated.  · Left atrial volume is moderately increased.  · Estimated right ventricular systolic pressure from tricuspid regurgitation is moderately elevated (45-55 mmHg). Calculated right ventricular systolic pressure from tricuspid regurgitation is 51 mmHg.  · Mildly reduced right ventricular systolic function noted.      I have reviewed the medications:  Scheduled Meds:digoxin, 250 mcg, Oral, Daily  enoxaparin, 40 mg, Subcutaneous, Daily  furosemide, 40 mg, Intravenous, Daily  pharmacy consult - [place], , Does not apply, Daily  sacubitril-valsartan, 0.5 tablet, Oral, Q12H  sodium chloride, 10 mL, Intravenous, Q12H      Continuous Infusions:   PRN Meds:.•  albuterol  •  benzonatate  •  Morphine  •  sodium chloride  •  sodium chloride    Assessment & Plan   Assessment & Plan     Active Hospital Problems    Diagnosis  POA   • **Acute systolic heart failure (HCC) [I50.21]  Yes   • COPD (chronic obstructive pulmonary disease) (HCC) [J44.9]  Yes   • Obesity (BMI 30-39.9) [E66.9]  Yes   • Respiratory distress [R06.03]  Yes   • Volume overload [E87.70]  Yes   • Nonrheumatic mitral valve regurgitation [I34.0]  Yes      Resolved Hospital Problems   No resolved problems to display.        Brief Hospital Course to date:  Herminia Hernandez is a 37 y.o. female with COPD who presented  to the ED due to increasing dyspnea and swelling.  She was found to have a reduced LVEF of 20% on echocardiogram.    This patient's problems and plans were partially entered by my partner and updated as appropriate by me 08/21/22.    Acute decompensated heart failure with reduced ejection fraction  Mitral valve regurgitation  Volume overload  - Continue IV diuresis  - Echo shows severely decreased left ventricular systolic function, severe mitral regurgitation  - Cardiology consulted.  Added entresto and digoxin, planning LHC on Monday.    Leukocytosis, improving  -- Suspect due to steroids.  Procalcitonin is low and she does not have any cough to suggest pneumonia.  Continue to monitor off antibiotics.    Expected Discharge Location and Transportation: Home  Expected Discharge Date: 8/23    DVT prophylaxis:  Medical DVT prophylaxis orders are present.     AM-PAC 6 Clicks Score (PT): 24 (08/20/22 0800)    CODE STATUS:   Code Status and Medical Interventions:   Ordered at: 08/19/22 1714     Level Of Support Discussed With:    Patient     Code Status (Patient has no pulse and is not breathing):    CPR (Attempt to Resuscitate)     Medical Interventions (Patient has pulse or is breathing):    Full Support       Francia Juan MD  08/21/22              Electronically signed by Francia Juan MD at 08/21/22 1700       Consult Notes (last 24 hours)  Notes from 08/21/22 0732 through 08/22/22 0732   No notes of this type exist for this encounter.

## 2022-08-22 NOTE — CASE MANAGEMENT/SOCIAL WORK
Discharge Planning Assessment  [facility]     Patient Name: Herminia Hernandez  MRN: [id number]  Today's Date: 8/22/2022    Admit Date: 8/19/2022     Discharge Needs Assessment     Row Name 08/22/22 1119       Living Environment    People in Home other relative(s)  adult cousin    Current Living Arrangements apartment    Primary Care Provided by self    Provides Primary Care For no one    Family Caregiver if Needed other relative(s)    Family Caregiver Names adult cousin, unknown    Quality of Family Relationships helpful;involved;supportive    Able to Return to Prior Arrangements yes       Resource/Environmental Concerns    Resource/Environmental Concerns none       Transition Planning    Patient/Family Anticipates Transition to home with family    Patient/Family Anticipated Services at Transition     Transportation Anticipated family or friend will provide       Discharge Needs Assessment    Equipment Currently Used at Home none    Concerns to be Addressed denies needs/concerns at this time    Discharge Coordination/Progress Lives in [place] with an adult cousin.  No HH, DME, AD.  No needs ATT, having heart cath today.               Discharge Plan     Row Name 08/22/22 1121       Plan    Plan Home    Patient/Family in Agreement with Plan yes    Plan Comments I spoke with patient at bedside.  Patient resides in an apartment in [place] with an adult cousin.  Patient denied any DME, a history of home health, and an advanced directive.  Patient does not have a PCP.  When she was getting prescriptions, she got them filled at [facility] off of [facility].  Patient's sister Kristi will be able to transport her at discharge.    Final Discharge Disposition Code 01 - home or self-care              Continued Care and Services - Admitted Since 8/19/2022    Coordination has not been started for this encounter.       Expected Discharge Date and Time     Expected Discharge Date Expected Discharge Time     Aug 26, 2022          Demographic Summary    No documentation.                Functional Status     Row Name 08/22/22 1119       Functional Status    Usual Activity Tolerance good    Current Activity Tolerance good       Functional Status, IADL    Medications independent    Meal Preparation independent    Housekeeping independent    Laundry independent    Shopping independent       Mental Status    General Appearance WDL WDL               Psychosocial    No documentation.                Abuse/Neglect    No documentation.                Legal    No documentation.                Substance Abuse    No documentation.                Patient Forms    No documentation.                   Julianne Mahoney RN

## 2022-08-22 NOTE — PROGRESS NOTES
"    [facility] Medicine Services  PROGRESS NOTE    Patient Name: Herminia Hernandez  : 1985  MRN: [id number]    Date of Admission: 2022  Primary Care Physician: Provider, No Known    Subjective   Subjective     CC:  New cardiomyopathy    HPI:  Plan for heart cath today, explained to her plan for heart cath and reason for it.  I also explained how her heart \"pump\" is weak    ROS:  Gen- No fevers, chills  CV- No chest pain, palpitations  Resp- No cough, dyspnea  GI- No N/V/D, abd pain        Objective   Objective     Vital Signs:   Temp:  [97.9 °F (36.6 °C)-98.4 °F (36.9 °C)] 97.9 °F (36.6 °C)  Heart Rate:  [] 113  Resp:  [17-19] 19  BP: ()/(64-77) 118/77  Flow (L/min):  [2] 2     Physical Exam:  Constitutional: No acute distress, awake, alert  HENT: NCAT, mucous membranes moist  Respiratory: Respiratory effort normal  Cardiovascular: Tachycardic but regular  Gastrointestinal: Soft, nontender, nondistended  Musculoskeletal: trace LE edema  Psychiatric: Appropriate affect, cooperative  Neurologic: Oriented x 3, speech clear  Skin: No rashes      Results Reviewed:  LAB RESULTS:      Lab 22  0529 22  0549 22  0612 22  1050   WBC 13.71* 14.87* 18.06* 11.23*   HEMOGLOBIN 14.2 13.9 12.8 12.3   HEMATOCRIT 43.7 42.8 37.9 38.0   PLATELETS 526* 555* 539* 447   NEUTROS ABS 6.77 8.21* 14.57* 5.97   IMMATURE GRANS (ABS) 0.06* 0.06* 0.12* 0.04   LYMPHS ABS 4.68* 4.94* 2.15 3.69*   MONOS ABS 1.42* 1.06* 1.17* 0.99*   EOS ABS 0.67* 0.49* 0.01 0.46*   MCV 93.8 93.9 93.1 96.0   PROCALCITONIN  --   --  0.03  --          Lab 22  0529 22  0549 22  0612 22  1050   SODIUM 136 139 136 140   POTASSIUM 4.6 4.0 4.7 4.4   CHLORIDE 101 103 100 107   CO2 26.0 27.0 27.0 26.0   ANION GAP 9.0 9.0 9.0 7.0   BUN 15 18 16 13   CREATININE 0.76 0.79 0.85 0.63   EGFR 103.0 98.3 90.1 116.6   GLUCOSE 90 107* 144* 108*   CALCIUM 8.8 8.8 8.7 8.9   TSH  --   --   --  3.230 "         Lab 08/20/22  0612 08/19/22  1050   TOTAL PROTEIN 6.7 6.5   ALBUMIN 3.10* 3.40*   GLOBULIN 3.6 3.1   ALT (SGPT) 45* 48*   AST (SGOT) 50* 56*   BILIRUBIN 0.3 0.3   ALK PHOS 93 88         Lab 08/20/22  0612 08/19/22  1050   PROBNP  --  5,156.0*   TROPONIN T <0.010 <0.010                 Brief Urine Lab Results     None          Microbiology Results Abnormal     Procedure Component Value - Date/Time    COVID PRE-OP / PRE-PROCEDURE SCREENING ORDER (NO ISOLATION) - Swab, Nasopharynx [[id number]]  (Normal) Collected: 08/19/22 1235    Lab Status: Final result Specimen: Swab from Nasopharynx Updated: 08/19/22 1340    Narrative:      The following orders were created for panel order COVID PRE-OP / PRE-PROCEDURE SCREENING ORDER (NO ISOLATION) - Swab, Nasopharynx.  Procedure                               Abnormality         Status                     ---------                               -----------         ------                     Respiratory Panel PCR w/...[[id number]]  Normal              Final result                 Please view results for these tests on the individual orders.    Respiratory Panel PCR w/COVID-19(SARS-CoV-2) LILLIAN/FABRICE/LIVIA/PAD/COR/MAD/CRISTINA In-House, NP Swab in UTM/VTM, 3-4 HR TAT - Swab, Nasopharynx [[id number]]  (Normal) Collected: 08/19/22 1235    Lab Status: Final result Specimen: Swab from Nasopharynx Updated: 08/19/22 1340     ADENOVIRUS, PCR Not Detected     Coronavirus 229E Not Detected     Coronavirus HKU1 Not Detected     Coronavirus NL63 Not Detected     Coronavirus OC43 Not Detected     COVID19 Not Detected     Human Metapneumovirus Not Detected     Human Rhinovirus/Enterovirus Not Detected     Influenza A PCR Not Detected     Influenza B PCR Not Detected     Parainfluenza Virus 1 Not Detected     Parainfluenza Virus 2 Not Detected     Parainfluenza Virus 3 Not Detected     Parainfluenza Virus 4 Not Detected     RSV, PCR Not Detected     Bordetella pertussis pcr Not Detected     Bordetella  parapertussis PCR Not Detected     Chlamydophila pneumoniae PCR Not Detected     Mycoplasma pneumo by PCR Not Detected    Narrative:      In the setting of a positive respiratory panel with a viral infection PLUS a negative procalcitonin without other underlying concern for bacterial infection, consider observing off antibiotics or discontinuation of antibiotics and continue supportive care. If the respiratory panel is positive for atypical bacterial infection (Bordetella pertussis, Chlamydophila pneumoniae, or Mycoplasma pneumoniae), consider antibiotic de-escalation to target atypical bacterial infection.          No radiology results from the last 24 hrs    Results for orders placed during the hospital encounter of 08/19/22    Adult Transthoracic Echo Complete W/ Cont if Necessary Per Protocol    Interpretation Summary  · Left ventricular ejection fraction appears to be less than 20%. Left ventricular systolic function is severely decreased.  · Left ventricular diastolic function is consistent with (grade II w/high LAP) pseudonormalization.  · Severe mitral valve regurgitation is present.  · The left ventricular cavity is moderately dilated.  · Left atrial volume is moderately increased.  · Estimated right ventricular systolic pressure from tricuspid regurgitation is moderately elevated (45-55 mmHg). Calculated right ventricular systolic pressure from tricuspid regurgitation is 51 mmHg.  · Mildly reduced right ventricular systolic function noted.      I have reviewed the medications:  Scheduled Meds:bumetanide, 0.5 mg, Oral, Daily  digoxin, 250 mcg, Oral, Daily  enoxaparin, 40 mg, Subcutaneous, Daily  pharmacy consult - MTM, , Does not apply, Daily  sacubitril-valsartan, 0.5 tablet, Oral, Q12H  sodium chloride, 10 mL, Intravenous, Q12H      Continuous Infusions:   PRN Meds:.•  albuterol  •  benzonatate  •  Morphine  •  sodium chloride  •  sodium chloride    Assessment & Plan   Assessment & Plan     Active  Hospital Problems    Diagnosis  POA   • **Acute systolic heart failure (HCC) [I50.21]  Yes   • COPD (chronic obstructive pulmonary disease) (HCC) [J44.9]  Yes   • Obesity (BMI 30-39.9) [E66.9]  Yes   • Respiratory distress [R06.03]  Yes   • Volume overload [E87.70]  Yes   • Nonrheumatic mitral valve regurgitation [I34.0]  Yes      Resolved Hospital Problems   No resolved problems to display.        Brief Hospital Course to date:  Herminia Hernandez is a 37 y.o. female with history of COPD who was admitted for worsening dyspnea and volume overload.  She was found to have reduced EF on echo, 20%.  This is new, she has been evaluated by cardiology and received IV diuresis.    Decompensated heart failure with reduced EF  Mitral valve regurgitation  Volume overload  -Continue Bumex  -Cardiology following, plan for left heart cath today   -Entresto added by cardiology, monitor renal function  -UDS positive for amphetamines and opiates    Leukocytosis, better  -Suspect related to steroids for COPD  -Pro-Rich low  -Monitor off antibiotics      Expected Discharge Location and Transportation: Home, 8/23    DVT prophylaxis:  Medical DVT prophylaxis orders are present.     AM-PAC 6 Clicks Score (PT): 24 (08/22/22 0830)    CODE STATUS:   Code Status and Medical Interventions:   Ordered at: 08/19/22 2482     Level Of Support Discussed With:    Patient     Code Status (Patient has no pulse and is not breathing):    CPR (Attempt to Resuscitate)     Medical Interventions (Patient has pulse or is breathing):    Full Support       Danica Hernandez, DO  08/22/22

## 2022-08-22 NOTE — PROGRESS NOTES
"[facility] Cardiology Daily Note       LOS: 3 days   Patient Care Team:  Provider, No Known as PCP - General    Chief Complaint:  HFrEF    Subjective     Subjective: no acute issues overnight. V/S and labs reviewed and are stable. She has no complaints this AM.     Review of Systems:   As above.    Medications:  bumetanide, 0.5 mg, Oral, Daily  digoxin, 250 mcg, Oral, Daily  enoxaparin, 40 mg, Subcutaneous, Daily  pharmacy consult - [place], , Does not apply, Daily  sacubitril-valsartan, 0.5 tablet, Oral, Q12H  sodium chloride, 10 mL, Intravenous, Q12H      Objective     Vital Sign Min/Max for last 24 hours  Temp  Min: 98.2 °F (36.8 °C)  Max: 98.4 °F (36.9 °C)    BP  Min: 96/66  Max: 114/75   Pulse  Min: 88  Max: 116   Resp  Min: 17  Max: 19   SpO2  Min: 95 %  Max: 99 %   Flow (L/min)  Min: 2  Max: 2   Weight  Min: 88.9 kg (196 lb)  Max: 88.9 kg (196 lb)      Intake/Output Summary (Last 24 hours) at 8/22/2022 1010  Last data filed at 8/22/2022 0300  Gross per 24 hour   Intake 780 ml   Output 1200 ml   Net -420 ml        Flowsheet Rows    Flowsheet Row First Filed Value   Admission Height 167.6 cm (66\") Documented at 08/19/2022 1025   Admission Weight 90.3 kg (199 lb) Documented at 08/19/2022 1025          Physical Exam:    General: Alert and oriented.   Cardiovascular: Heart has a nondisplaced focal PMI. Regular rhythm, tachy without murmur, gallop or rub.  Lungs: Clear without rales or wheezes. Equal expansion is noted.   Abdomen: Soft, nontender.  Extremities: Show no edema.   Skin: warm and dry.     Results Review:    I reviewed the patient's new clinical results.  EKG:  Tele: Sinus tachycardia    Labs:    Results from last 7 days   Lab Units 08/22/22  0529 08/21/22  0549 08/20/22  0612 08/19/22  1050   SODIUM mmol/L 136 139 136 140   POTASSIUM mmol/L 4.6 4.0 4.7 4.4   CHLORIDE mmol/L 101 103 100 107   CO2 mmol/L 26.0 27.0 27.0 26.0   BUN mg/dL 15 18 16 13   CREATININE mg/dL 0.76 0.79 0.85 0.63 "   CALCIUM mg/dL 8.8 8.8 8.7 8.9   BILIRUBIN mg/dL  --   --  0.3 0.3   ALK PHOS U/L  --   --  93 88   ALT (SGPT) U/L  --   --  45* 48*   AST (SGOT) U/L  --   --  50* 56*   GLUCOSE mg/dL 90 107* 144* 108*     Results from last 7 days   Lab Units 08/22/22  0529 08/21/22  0549 08/20/22  0612   WBC 10*3/mm3 13.71* 14.87* 18.06*   HEMOGLOBIN g/dL 14.2 13.9 12.8   HEMATOCRIT % 43.7 42.8 37.9   PLATELETS 10*3/mm3 526* 555* 539*     Lab Results   Component Value Date    TROPONINT <0.010 08/20/2022    TROPONINT <0.010 08/19/2022        No results found for: INR, PROTIME      Assessment   1. Systolic heart failure, acute on chronic   1. Echo 8/19/2022, RDS: EF 20%, grade 2 diastolic dysfunction, severe MR, LV moderately dilated, LA moderately dilated, RVSP 45 to 55 mmHg, mildly reduced right ventricular systolic function.  2. Previous methamphetamine use  3. Mitral regurgitation  4. Reported history of COPD  asthma  5. Intellectual disability        Plan:     1. Digoxin 250 mcg daily for tachycardia  2. Continue Entresto 24-26 1/2 tablet twice daily  3. Low-sodium diet, Strict I's and O's, Daily weights  4. Continue oral Bumex   LHC plus/minus CBI today using RFA. The risks, benefits, and alternatives of the procedure have been reviewed and the patient wishes to proceed.       Electronically signed by HEMALATHA Madden, 08/22/22, 10:10 AM EDT.

## 2022-08-23 LAB
QT INTERVAL: 338 MS
QT INTERVAL: 382 MS
QTC INTERVAL: 465 MS
QTC INTERVAL: 472 MS

## 2022-08-23 PROCEDURE — 99232 SBSQ HOSP IP/OBS MODERATE 35: CPT | Performed by: INTERNAL MEDICINE

## 2022-08-23 RX ADMIN — Medication 10 ML: at 08:46

## 2022-08-23 RX ADMIN — BUMETANIDE 0.5 MG: 1 TABLET ORAL at 08:45

## 2022-08-23 RX ADMIN — IVABRADINE 5 MG: 5 TABLET, FILM COATED ORAL at 17:06

## 2022-08-23 RX ADMIN — Medication 10 ML: at 21:33

## 2022-08-23 RX ADMIN — DIGOXIN 250 MCG: 250 TABLET ORAL at 13:44

## 2022-08-23 RX ADMIN — SACUBITRIL AND VALSARTAN 0.5 TABLET: 24; 26 TABLET, FILM COATED ORAL at 08:46

## 2022-08-23 NOTE — PROGRESS NOTES
"[facility] Cardiology Daily Note       LOS: 4 days   Patient Care Team:  Provider, No Known as PCP - General    Chief Complaint: Cardiomyopathy/systolic heart failure    Subjective     Subjective: Wants to go home.  Asymptomatic with blood pressures in the 90s.  95% on room air.  Blood pressures have been marginal.  Heart rates still remain around 100.  Has a medical card.  Likely could not afford Corlanor.    Review of Systems:   As above.    Medications:  bumetanide, 0.5 mg, Oral, Daily  digoxin, 250 mcg, Oral, Daily  pharmacy consult - [place], , Does not apply, Daily  sacubitril-valsartan, 0.5 tablet, Oral, Q12H  sodium chloride, 10 mL, Intravenous, Q12H        Objective     Vital Sign Min/Max for last 24 hours  Temp  Min: 97.7 °F (36.5 °C)  Max: 98.6 °F (37 °C)   BP  Min: 89/56  Max: 118/84   Pulse  Min: 82  Max: 114   Resp  Min: 18  Max: 20   SpO2  Min: 94 %  Max: 100 %   Flow (L/min)  Min: 2  Max: 2   No data recorded      Intake/Output Summary (Last 24 hours) at 8/23/2022 1143  Last data filed at 8/23/2022 0900  Gross per 24 hour   Intake 240 ml   Output --   Net 240 ml        Flowsheet Rows    Flowsheet Row First Filed Value   Admission Height 167.6 cm (66\") Documented at 08/19/2022 1025   Admission Weight 90.3 kg (199 lb) Documented at 08/19/2022 1025          Physical Exam:    General: Alert and oriented.   Cardiovascular: Heart has a nondisplaced focal PMI.  Tachycardic regular rate and rhythm without murmur heard  Lungs: Clear without rales or wheezes. Equal expansion is noted.   Abdomen: Soft, nontender.  Extremities: Show no edema. Right groin looks good.  No hematoma or bruit.  Skin: warm and dry.     Results Review:    I reviewed the patient's new clinical results.  EKG:  Tele: Sinus tach    Labs:    Results from last 7 days   Lab Units 08/22/22  0529 08/21/22  0549 08/20/22  0612 08/19/22  1050   SODIUM mmol/L 136 139 136 140   POTASSIUM mmol/L 4.6 4.0 4.7 4.4   CHLORIDE mmol/L 101 " 103 100 107   CO2 mmol/L 26.0 27.0 27.0 26.0   BUN mg/dL 15 18 16 13   CREATININE mg/dL 0.76 0.79 0.85 0.63   CALCIUM mg/dL 8.8 8.8 8.7 8.9   BILIRUBIN mg/dL  --   --  0.3 0.3   ALK PHOS U/L  --   --  93 88   ALT (SGPT) U/L  --   --  45* 48*   AST (SGOT) U/L  --   --  50* 56*   GLUCOSE mg/dL 90 107* 144* 108*     Results from last 7 days   Lab Units 08/22/22  0529 08/21/22  0549 08/20/22  0612   WBC 10*3/mm3 13.71* 14.87* 18.06*   HEMOGLOBIN g/dL 14.2 13.9 12.8   HEMATOCRIT % 43.7 42.8 37.9   PLATELETS 10*3/mm3 526* 555* 539*     Lab Results   Component Value Date    TROPONINT <0.010 08/20/2022    TROPONINT <0.010 08/19/2022     No results found for: CHOL  No results found for: TRIG  No results found for: HDL  No components found for: LDLCALC  No results found for: INR, PROTIME      Ejection Fraction:    Assessment   Assessment:    1. Systolic heart failure, acute on chronic   1. Echo 8/19/2022, RDS: EF 20%, grade 2 diastolic dysfunction, severe MR, LV moderately dilated, LA moderately dilated, RVSP 45 to 55 mmHg, mildly reduced right ventricular systolic function.  2. Previous methamphetamine use  3. Normal coronary arteries by catheterization 8/22/2022  2. Mitral regurgitation  3. Reported history of COPD  asthma         Plan:    1. Digoxin 250 mcg daily  2. Begin Entresto 24-26 1/2 tablet twice daily  3. Low-sodium diet  4. Strict I's and O's  5. Daily weights  6. Start oral Bumex   7. We will start Corlanor.  May be able to get it through [employer] or through another social network program.  8. LifeVest  9. May be discharged home once LifeVest is placed.        Orquidea Tom MD  08/23/22  11:43 EDT

## 2022-08-23 NOTE — PLAN OF CARE
Goal Outcome Evaluation:  Plan of Care Reviewed With: patient        Progress: no change  Outcome Evaluation: ST on tele, vss, denies pain. life vest applied at 1730.

## 2022-08-23 NOTE — CASE MANAGEMENT/SOCIAL WORK
Case Management Discharge Note      Final Note: There is a possibility that patient will be sent home tonight depending on when she can get her life vest prior to discharge.  Patient will be sent home on Corlanor 5 mg BID. CM has filled out a prior authorization but it was faxed in as a paper copy.  Unfortunately CM is unable to get a cost for the medication for her prescription as it has not been sent it to any pharmacy just yet.  I spoke with patient at bedside. She denies any needs prior to discharge.  Patient's pharmacy has been changed to [facility] on [address], per her preference.  CM is unable to assist with any cost at an outside pharmacy, and the outpatient pharmacy here closes at 1700.  Important to note, CM can only help with prescriptions costing up to $75.         Selected Continued Care - Admitted Since 8/19/2022     Destination    No services have been selected for the patient.              Durable Medical Equipment    No services have been selected for the patient.              Dialysis/Infusion    No services have been selected for the patient.              Home Medical Care    No services have been selected for the patient.              Therapy    No services have been selected for the patient.              Community Resources    No services have been selected for the patient.              Community & DME    No services have been selected for the patient.                       Final Discharge Disposition Code: 01 - home or self-care

## 2022-08-23 NOTE — PROGRESS NOTES
[facility] Medicine Services  PROGRESS NOTE    Patient Name: Herminia Hernandez  : 1985  MRN: [id number]    Date of Admission: 2022  Primary Care Physician: Provider, No Known    Subjective   Subjective     CC:  Heart failure    HPI:  Discussed that she needed life vest and has to wait for discharge until she can go.  Sister was on the phone during the discussion. Feeling ok otherwise, not short of breath    ROS:  Gen- No fevers, chills  CV- No chest pain, palpitations  Resp- No cough, dyspnea  GI- No N/V/D, abd pain      Objective   Objective     Vital Signs:   Temp:  [97.7 °F (36.5 °C)-98.6 °F (37 °C)] 98.2 °F (36.8 °C)  Heart Rate:  [] 94  Resp:  [18-20] 18  BP: ()/(50-87) 91/50  Flow (L/min):  [2] 2     Physical Exam:  Constitutional: No acute distress, awake, alert  HENT: NCAT, mucous membranes moist  Respiratory: Clear to auscultation bilaterally, respiratory effort normal   Cardiovascular: tachycardic but regular  Gastrointestinal: Soft, nontender, nondistended  Musculoskeletal: No bilateral ankle edema  Psychiatric: Appropriate affect, cooperative  Neurologic: Oriented x 3, speech clear  Skin: No rashes      Results Reviewed:  LAB RESULTS:      Lab 22  0529 22  0549 22  0612 22  1050   WBC 13.71* 14.87* 18.06* 11.23*   HEMOGLOBIN 14.2 13.9 12.8 12.3   HEMATOCRIT 43.7 42.8 37.9 38.0   PLATELETS 526* 555* 539* 447   NEUTROS ABS 6.77 8.21* 14.57* 5.97   IMMATURE GRANS (ABS) 0.06* 0.06* 0.12* 0.04   LYMPHS ABS 4.68* 4.94* 2.15 3.69*   MONOS ABS 1.42* 1.06* 1.17* 0.99*   EOS ABS 0.67* 0.49* 0.01 0.46*   MCV 93.8 93.9 93.1 96.0   PROCALCITONIN  --   --  0.03  --          Lab 22  0529 22  0549 22  0612 22  1050   SODIUM 136 139 136 140   POTASSIUM 4.6 4.0 4.7 4.4   CHLORIDE 101 103 100 107   CO2 26.0 27.0 27.0 26.0   ANION GAP 9.0 9.0 9.0 7.0   BUN 15 18 16 13   CREATININE 0.76 0.79 0.85 0.63   EGFR 103.0 98.3 90.1 116.6    GLUCOSE 90 107* 144* 108*   CALCIUM 8.8 8.8 8.7 8.9   TSH  --   --   --  3.230         Lab 08/20/22  0612 08/19/22  1050   TOTAL PROTEIN 6.7 6.5   ALBUMIN 3.10* 3.40*   GLOBULIN 3.6 3.1   ALT (SGPT) 45* 48*   AST (SGOT) 50* 56*   BILIRUBIN 0.3 0.3   ALK PHOS 93 88         Lab 08/20/22  0612 08/19/22  1050   PROBNP  --  5,156.0*   TROPONIN T <0.010 <0.010                 Brief Urine Lab Results     None          Microbiology Results Abnormal     Procedure Component Value - Date/Time    COVID PRE-OP / PRE-PROCEDURE SCREENING ORDER (NO ISOLATION) - Swab, Nasopharynx [[id number]]  (Normal) Collected: 08/19/22 1235    Lab Status: Final result Specimen: Swab from Nasopharynx Updated: 08/19/22 1340    Narrative:      The following orders were created for panel order COVID PRE-OP / PRE-PROCEDURE SCREENING ORDER (NO ISOLATION) - Swab, Nasopharynx.  Procedure                               Abnormality         Status                     ---------                               -----------         ------                     Respiratory Panel PCR w/...[[id number]]  Normal              Final result                 Please view results for these tests on the individual orders.    Respiratory Panel PCR w/COVID-19(SARS-CoV-2) LILLIAN/FABRICE/LIVIA/PAD/COR/MAD/CRISTINA In-House, NP Swab in UTM/VTM, 3-4 HR TAT - Swab, Nasopharynx [[id number]]  (Normal) Collected: 08/19/22 1235    Lab Status: Final result Specimen: Swab from Nasopharynx Updated: 08/19/22 1340     ADENOVIRUS, PCR Not Detected     Coronavirus 229E Not Detected     Coronavirus HKU1 Not Detected     Coronavirus NL63 Not Detected     Coronavirus OC43 Not Detected     COVID19 Not Detected     Human Metapneumovirus Not Detected     Human Rhinovirus/Enterovirus Not Detected     Influenza A PCR Not Detected     Influenza B PCR Not Detected     Parainfluenza Virus 1 Not Detected     Parainfluenza Virus 2 Not Detected     Parainfluenza Virus 3 Not Detected     Parainfluenza Virus 4 Not Detected      RSV, PCR Not Detected     Bordetella pertussis pcr Not Detected     Bordetella parapertussis PCR Not Detected     Chlamydophila pneumoniae PCR Not Detected     Mycoplasma pneumo by PCR Not Detected    Narrative:      In the setting of a positive respiratory panel with a viral infection PLUS a negative procalcitonin without other underlying concern for bacterial infection, consider observing off antibiotics or discontinuation of antibiotics and continue supportive care. If the respiratory panel is positive for atypical bacterial infection (Bordetella pertussis, Chlamydophila pneumoniae, or Mycoplasma pneumoniae), consider antibiotic de-escalation to target atypical bacterial infection.          Cardiac Catheterization/Vascular Study    Result Date: 8/22/2022  FINAL     Impression: · Normal coronary arteries · Normal left heart pressures RECOMMENDATIONS: · Recommendation the patient's nonischemic cardiomyopathy Indications: LVEF of less than 20% in a 37-year-old female who presented with systolic congestive heart failure and positive methamphetamine on drug screen. Access: Right femoral artery (both ulnar pulses were nonpalpable and could not be found on oximetry) Estimated blood loss: Less than 15 mL Procedures: · Left heart catheterization. · Selective coronary angiography. Procedure narrative: The patient was brought to the catheterization lab in a fasting condition.  Access site was prepped and draped in standard sterile fashion.  Lidocaine was injected and arterial access was obtained by percutaneous anterior wall puncture technique.  A 6 Barbadian arterial sheath was placed in the right femoral artery using a modified Seldinger technique.  Selective coronary arteriography was performed using the Susi technique with a 6 Barbadian 4 curved Susi right catheter and a 6 Barb[place] 4 curved Susi left catheter.  Nonionic contrast was used and was injected manually.  Left heart pressures were obtained.  No left  ventriculogram was performed.  Following the procedure the patient sheath was Tegaderm in place and will be pulled in recovery.  There were no complications. Contrast: 50 ml Hemodynamic Findings: LV pressure: 90/2/4 mmHg, on pull back no gradient was recorded across the aortic valve. Ao pressure: 90/60 mmHg Left ventriculography: Not performed Angiographic Findings: RCA: The right coronary artery is codominant the posterior circulation and gives rise to a small PDA as well as 2 RV marginal branches.  The right coronary artery and its branches contain no disease. LMCA: The left main coronary artery gives rise to the LAD and circumflex vessels and is free of disease. Circumflex: Circumflex coronary artery is codominant for the posterior circulation and gives rise to a tiny first obtuse marginal branch large second obtuse marginal branch small fourth obtuse marginal branch and the PDA.  The circumflex and its branches contain no disease. LAD: The LAD gives rise to a small first diagonal branch small second diagonal branch a large third diagonal branch small fourth diagonal branch and terminates as a small apical recurrent branch.  The LAD and its branches contain no disease.       Results for orders placed during the hospital encounter of 08/19/22    Adult Transthoracic Echo Complete W/ Cont if Necessary Per Protocol    Interpretation Summary  · Left ventricular ejection fraction appears to be less than 20%. Left ventricular systolic function is severely decreased.  · Left ventricular diastolic function is consistent with (grade II w/high LAP) pseudonormalization.  · Severe mitral valve regurgitation is present.  · The left ventricular cavity is moderately dilated.  · Left atrial volume is moderately increased.  · Estimated right ventricular systolic pressure from tricuspid regurgitation is moderately elevated (45-55 mmHg). Calculated right ventricular systolic pressure from tricuspid regurgitation is 51 mmHg.  ·  Mildly reduced right ventricular systolic function noted.      I have reviewed the medications:  Scheduled Meds:bumetanide, 0.5 mg, Oral, Daily  digoxin, 250 mcg, Oral, Daily  ivabradine HCl, 5 mg, Oral, BID With Meals  pharmacy consult - [place], , Does not apply, Daily  sacubitril-valsartan, 0.5 tablet, Oral, Q12H  sodium chloride, 10 mL, Intravenous, Q12H      Continuous Infusions:   PRN Meds:.•  acetaminophen  •  albuterol  •  ALPRAZolam  •  atropine  •  benzonatate  •  HYDROcodone-acetaminophen  •  Morphine **AND** naloxone  •  Morphine  •  sodium chloride  •  sodium chloride  •  sodium chloride    Assessment & Plan   Assessment & Plan     Active Hospital Problems    Diagnosis  POA   • **Acute systolic heart failure (HCC) [I50.21]  Yes   • COPD (chronic obstructive pulmonary disease) (HCC) [J44.9]  Yes   • Obesity (BMI 30-39.9) [E66.9]  Yes   • Respiratory distress [R06.03]  Yes   • Volume overload [E87.70]  Yes   • Nonrheumatic mitral valve regurgitation [I34.0]  Yes      Resolved Hospital Problems   No resolved problems to display.        Brief Hospital Course to date:  Herminia Hernandez is a 37 y.o. female with history of COPD who was admitted for worsening dyspnea and volume overload.  She was found to have reduced EF on echo, 20%.  This is new, she has been evaluated by cardiology and received IV diuresis.     Decompensated heart failure with reduced EF, improved with diuresis  Mitral valve regurgitation  Volume overload  -Continue Bumex  -Cardiology following, left heart cath on 8/22 with normal coronary arteries  -life vest ordered, discussed with Dr Tom  -Entreo added by cardiology, monitor renal function  -UDS positive for amphetamines and opiates     Leukocytosis,  improved  -Suspect related to steroids for COPD  -Pro-Rich low  -Monitor off antibiotics      Expected Discharge Location and Transportation: home  Expected Discharge Date: 8/24     DVT prophylaxis:  No DVT prophylaxis order currently  exists.     AM-PAC 6 Clicks Score (PT): 24 (08/22/22 0830)    CODE STATUS:   Code Status and Medical Interventions:   Ordered at: 08/22/22 3346     Level Of Support Discussed With:    Patient     Code Status (Patient has no pulse and is not breathing):    CPR (Attempt to Resuscitate)     Medical Interventions (Patient has pulse or is breathing):    Full       Danica Hernandez, DO  08/23/22

## 2022-08-24 ENCOUNTER — READMISSION MANAGEMENT (OUTPATIENT)
Dept: CALL CENTER | Facility: HOSPITAL | Age: 37
End: 2022-08-24

## 2022-08-24 VITALS
HEIGHT: 66 IN | WEIGHT: 196.4 LBS | RESPIRATION RATE: 18 BRPM | OXYGEN SATURATION: 95 % | TEMPERATURE: 98.5 F | BODY MASS INDEX: 31.57 KG/M2 | SYSTOLIC BLOOD PRESSURE: 114 MMHG | HEART RATE: 109 BPM | DIASTOLIC BLOOD PRESSURE: 73 MMHG

## 2022-08-24 PROBLEM — R06.03 RESPIRATORY DISTRESS: Status: RESOLVED | Noted: 2022-08-19 | Resolved: 2022-08-24

## 2022-08-24 PROBLEM — I50.21 ACUTE SYSTOLIC HEART FAILURE: Status: ACTIVE | Noted: 2022-08-24

## 2022-08-24 PROBLEM — E87.70 VOLUME OVERLOAD: Status: RESOLVED | Noted: 2022-08-19 | Resolved: 2022-08-24

## 2022-08-24 PROBLEM — I50.40 HEART FAILURE WITH REDUCED EJECTION FRACTION AND DIASTOLIC DYSFUNCTION: Status: ACTIVE | Noted: 2022-08-24

## 2022-08-24 PROBLEM — I50.21 ACUTE SYSTOLIC HEART FAILURE: Status: RESOLVED | Noted: 2022-08-19 | Resolved: 2022-08-24

## 2022-08-24 LAB
ALBUMIN SERPL-MCNC: 3.5 G/DL (ref 3.5–5.2)
ANION GAP SERPL CALCULATED.3IONS-SCNC: 11 MMOL/L (ref 5–15)
BASOPHILS # BLD AUTO: 0.13 10*3/MM3 (ref 0–0.2)
BASOPHILS NFR BLD AUTO: 0.9 % (ref 0–1.5)
BUN SERPL-MCNC: 17 MG/DL (ref 6–20)
BUN/CREAT SERPL: 21.3 (ref 7–25)
CALCIUM SPEC-SCNC: 9.1 MG/DL (ref 8.6–10.5)
CHLORIDE SERPL-SCNC: 100 MMOL/L (ref 98–107)
CO2 SERPL-SCNC: 23 MMOL/L (ref 22–29)
CREAT SERPL-MCNC: 0.8 MG/DL (ref 0.57–1)
DEPRECATED RDW RBC AUTO: 45.8 FL (ref 37–54)
EGFRCR SERPLBLD CKD-EPI 2021: 96.9 ML/MIN/1.73
EOSINOPHIL # BLD AUTO: 0.56 10*3/MM3 (ref 0–0.4)
EOSINOPHIL NFR BLD AUTO: 4 % (ref 0.3–6.2)
ERYTHROCYTE [DISTWIDTH] IN BLOOD BY AUTOMATED COUNT: 13.9 % (ref 12.3–15.4)
GLUCOSE SERPL-MCNC: 109 MG/DL (ref 65–99)
HCT VFR BLD AUTO: 43.9 % (ref 34–46.6)
HGB BLD-MCNC: 14.9 G/DL (ref 12–15.9)
IMM GRANULOCYTES # BLD AUTO: 0.1 10*3/MM3 (ref 0–0.05)
IMM GRANULOCYTES NFR BLD AUTO: 0.7 % (ref 0–0.5)
LYMPHOCYTES # BLD AUTO: 4.04 10*3/MM3 (ref 0.7–3.1)
LYMPHOCYTES NFR BLD AUTO: 28.7 % (ref 19.6–45.3)
MCH RBC QN AUTO: 30.7 PG (ref 26.6–33)
MCHC RBC AUTO-ENTMCNC: 33.9 G/DL (ref 31.5–35.7)
MCV RBC AUTO: 90.3 FL (ref 79–97)
MONOCYTES # BLD AUTO: 1.47 10*3/MM3 (ref 0.1–0.9)
MONOCYTES NFR BLD AUTO: 10.4 % (ref 5–12)
NEUTROPHILS NFR BLD AUTO: 55.3 % (ref 42.7–76)
NEUTROPHILS NFR BLD AUTO: 7.78 10*3/MM3 (ref 1.7–7)
NRBC BLD AUTO-RTO: 0 /100 WBC (ref 0–0.2)
PHOSPHATE SERPL-MCNC: 4.1 MG/DL (ref 2.5–4.5)
PLATELET # BLD AUTO: 530 10*3/MM3 (ref 140–450)
PMV BLD AUTO: 9.2 FL (ref 6–12)
POTASSIUM SERPL-SCNC: 4.7 MMOL/L (ref 3.5–5.2)
RBC # BLD AUTO: 4.86 10*6/MM3 (ref 3.77–5.28)
SODIUM SERPL-SCNC: 134 MMOL/L (ref 136–145)
WBC NRBC COR # BLD: 14.08 10*3/MM3 (ref 3.4–10.8)

## 2022-08-24 PROCEDURE — 99239 HOSP IP/OBS DSCHRG MGMT >30: CPT | Performed by: NURSE PRACTITIONER

## 2022-08-24 PROCEDURE — 85025 COMPLETE CBC W/AUTO DIFF WBC: CPT | Performed by: INTERNAL MEDICINE

## 2022-08-24 PROCEDURE — 80069 RENAL FUNCTION PANEL: CPT | Performed by: INTERNAL MEDICINE

## 2022-08-24 RX ORDER — BUMETANIDE 0.5 MG/1
0.5 TABLET ORAL DAILY
Qty: 30 TABLET | Refills: 0 | Status: SHIPPED | OUTPATIENT
Start: 2022-08-24 | End: 2022-09-21 | Stop reason: SDUPTHER

## 2022-08-24 RX ORDER — DIGOXIN 250 MCG
250 TABLET ORAL
Qty: 30 TABLET | Refills: 0 | Status: SHIPPED | OUTPATIENT
Start: 2022-08-24 | End: 2022-09-21 | Stop reason: SDUPTHER

## 2022-08-24 RX ADMIN — IVABRADINE 5 MG: 5 TABLET, FILM COATED ORAL at 09:18

## 2022-08-24 RX ADMIN — BUMETANIDE 0.5 MG: 1 TABLET ORAL at 09:18

## 2022-08-24 RX ADMIN — SACUBITRIL AND VALSARTAN 0.5 TABLET: 24; 26 TABLET, FILM COATED ORAL at 09:18

## 2022-08-24 NOTE — DISCHARGE SUMMARY
[facility] Medicine Services  DISCHARGE SUMMARY    Patient Name: Herminia Hernandez  : 1985  MRN: [id number]    Date of Admission: 2022 10:45 AM  Date of Discharge:  2022  Primary Care Physician: Provider, No Known    Consults     Date and Time Order Name Status Description    2022  6:06 PM Inpatient Cardiology Consult Completed           Hospital Course     Presenting Problem:   Respiratory distress [R06.03]    Active Hospital Problems    Diagnosis  POA   • **Acute systolic heart failure (HCC) [I50.21]  Yes   • COPD (chronic obstructive pulmonary disease) (HCC) [J44.9]  Yes   • Obesity (BMI 30-39.9) [E66.9]  Yes   • Respiratory distress [R06.03]  Yes   • Volume overload [E87.70]  Yes   • Nonrheumatic mitral valve regurgitation [I34.0]  Yes      Resolved Hospital Problems   No resolved problems to display.        Hospital Course:  Herminia Hernandez is a 37 y.o. female with history of COPD who was admitted for worsening dyspnea and volume overload.  She was found to have reduced EF on echo, 20%, which is new. She was evaluated by cardiology and received IV diuresis.     Decompensated heart failure with reduced EF, improved with diuresis  Mitral valve regurgitation  Volume overload  -Diuresed with Bumex  -Cardiology following, left heart cath on  with normal coronary arteries, EF 20%, grade 2 diastolic dysfunction, severe MR, moderate LA and LV dilation, RVSP 45-55 mmHg  -Unable to tolerate BB d/t hypotension, started Corlanor and digoxin for rate control  -Entresto added and will continue twice daily at dc, monitor renal function  -Continue PO Bumex, monitor sodium  -Low-sodium diet  -Daily weights  -LifeVest placed  -UDS positive for amphetamines and opiates     Leukocytosis, improved  -Suspect related to steroids for COPD, procal wnl  -Monitored off antibiotics    Discharge Follow Up Recommendations for outpatient labs/diagnostics:  --F/u with PCP in one week with repeat  BMP to monitor renal function and sodium  --Follow up with Dr. Tom or HEMALATHA May, 4-6 weeks    Day of Discharge     HPI:   Patient resting in bed. LifeVest in place. She denies any pain. She is eager to go home. Spoke with [facility] retail pharmacy and they are waiting on a PA for Corlanor. They will deliver meds to patient's room. Patient agreeable to [facility] PCP at discharge for BMP in one week.     Review of Systems  Gen- No fevers, chills  CV- No chest pain, palpitations  Resp- No cough, dyspnea  GI- No N/V/D, abd pain    Vital Signs:   Temp:  [98.2 °F (36.8 °C)-98.5 °F (36.9 °C)] 98.5 °F (36.9 °C)  Heart Rate:  [] 101  Resp:  [18] 18  BP: ()/(50-75) 106/75      Physical Exam:  Constitutional: No acute distress, awake, alert  HENT: NCAT, mucous membranes moist  Respiratory: Clear to auscultation bilaterally, respiratory effort normal, room air  Cardiovascular: RRR, no murmurs, rubs, or gallops. LifeVest in place  Gastrointestinal: Positive bowel sounds, soft, nontender, nondistended  Musculoskeletal: No bilateral ankle edema  Psychiatric: Appropriate affect, cooperative  Neurologic: Oriented x 3, strength symmetric in all extremities, Cranial Nerves grossly intact to confrontation, speech clear  Skin: No rashes      Pertinent  and/or Most Recent Results     LAB RESULTS:      Lab 08/24/22  0559 08/22/22  0529 08/21/22  0549 08/20/22  0612 08/19/22  1050   WBC 14.08* 13.71* 14.87* 18.06* 11.23*   HEMOGLOBIN 14.9 14.2 13.9 12.8 12.3   HEMATOCRIT 43.9 43.7 42.8 37.9 38.0   PLATELETS 530* 526* 555* 539* 447   NEUTROS ABS 7.78* 6.77 8.21* 14.57* 5.97   IMMATURE GRANS (ABS) 0.10* 0.06* 0.06* 0.12* 0.04   LYMPHS ABS 4.04* 4.68* 4.94* 2.15 3.69*   MONOS ABS 1.47* 1.42* 1.06* 1.17* 0.99*   EOS ABS 0.56* 0.67* 0.49* 0.01 0.46*   MCV 90.3 93.8 93.9 93.1 96.0   PROCALCITONIN  --   --   --  0.03  --          Lab 08/24/22  0559 08/22/22  0529 08/21/22  0549 08/20/22  0612 08/19/22  1050   SODIUM  134* 136 139 136 140   POTASSIUM 4.7 4.6 4.0 4.7 4.4   CHLORIDE 100 101 103 100 107   CO2 23.0 26.0 27.0 27.0 26.0   ANION GAP 11.0 9.0 9.0 9.0 7.0   BUN 17 15 18 16 13   CREATININE 0.80 0.76 0.79 0.85 0.63   EGFR 96.9 103.0 98.3 90.1 116.6   GLUCOSE 109* 90 107* 144* 108*   CALCIUM 9.1 8.8 8.8 8.7 8.9   PHOSPHORUS 4.1  --   --   --   --    TSH  --   --   --   --  3.230         Lab 08/24/22  0559 08/20/22  0612 08/19/22  1050   TOTAL PROTEIN  --  6.7 6.5   ALBUMIN 3.50 3.10* 3.40*   GLOBULIN  --  3.6 3.1   ALT (SGPT)  --  45* 48*   AST (SGOT)  --  50* 56*   BILIRUBIN  --  0.3 0.3   ALK PHOS  --  93 88         Lab 08/20/22  0612 08/19/22  1050   PROBNP  --  5,156.0*   TROPONIN T <0.010 <0.010                 Brief Urine Lab Results     None        Microbiology Results (last 10 days)     Procedure Component Value - Date/Time    COVID PRE-OP / PRE-PROCEDURE SCREENING ORDER (NO ISOLATION) - Swab, Nasopharynx [[id number]]  (Normal) Collected: 08/19/22 1235    Lab Status: Final result Specimen: Swab from Nasopharynx Updated: 08/19/22 1340    Narrative:      The following orders were created for panel order COVID PRE-OP / PRE-PROCEDURE SCREENING ORDER (NO ISOLATION) - Swab, Nasopharynx.  Procedure                               Abnormality         Status                     ---------                               -----------         ------                     Respiratory Panel PCR w/...[[id number]]  Normal              Final result                 Please view results for these tests on the individual orders.    Respiratory Panel PCR w/COVID-19(SARS-CoV-2) LILLIAN/[facility]/LIVIA/PAD/COR/MAD/CRISTINA In-House, NP Swab in UTM/VTM, 3-4 HR TAT - Swab, Nasopharynx [[id number]]  (Normal) Collected: 08/19/22 1235    Lab Status: Final result Specimen: Swab from Nasopharynx Updated: 08/19/22 1340     ADENOVIRUS, PCR Not Detected     Coronavirus 229E Not Detected     Coronavirus HKU1 Not Detected     Coronavirus NL63 Not Detected     Coronavirus OC43  Not Detected     COVID19 Not Detected     Human Metapneumovirus Not Detected     Human Rhinovirus/Enterovirus Not Detected     Influenza A PCR Not Detected     Influenza B PCR Not Detected     Parainfluenza Virus 1 Not Detected     Parainfluenza Virus 2 Not Detected     Parainfluenza Virus 3 Not Detected     Parainfluenza Virus 4 Not Detected     RSV, PCR Not Detected     Bordetella pertussis pcr Not Detected     Bordetella parapertussis PCR Not Detected     Chlamydophila pneumoniae PCR Not Detected     Mycoplasma pneumo by PCR Not Detected    Narrative:      In the setting of a positive respiratory panel with a viral infection PLUS a negative procalcitonin without other underlying concern for bacterial infection, consider observing off antibiotics or discontinuation of antibiotics and continue supportive care. If the respiratory panel is positive for atypical bacterial infection (Bordetella pertussis, Chlamydophila pneumoniae, or Mycoplasma pneumoniae), consider antibiotic de-escalation to target atypical bacterial infection.          Adult Transthoracic Echo Complete W/ Cont if Necessary Per Protocol    Result Date: 8/19/2022  · Left ventricular ejection fraction appears to be less than 20%. Left ventricular systolic function is severely decreased. · Left ventricular diastolic function is consistent with (grade II w/high LAP) pseudonormalization. · Severe mitral valve regurgitation is present. · The left ventricular cavity is moderately dilated. · Left atrial volume is moderately increased. · Estimated right ventricular systolic pressure from tricuspid regurgitation is moderately elevated (45-55 mmHg). Calculated right ventricular systolic pressure from tricuspid regurgitation is 51 mmHg. · Mildly reduced right ventricular systolic function noted.      Cardiac Catheterization/Vascular Study    Result Date: 8/22/2022  FINAL     · Normal coronary arteries · Normal left heart pressures RECOMMENDATIONS: ·  Recommendation the patient's nonischemic cardiomyopathy Indications: LVEF of less than 20% in a 37-year-old female who presented with systolic congestive heart failure and positive methamphetamine on drug screen. Access: Right femoral artery (both ulnar pulses were nonpalpable and could not be found on oximetry) Estimated blood loss: Less than 15 mL Procedures: · Left heart catheterization. · Selective coronary angiography. Procedure narrative: The patient was brought to the catheterization lab in a fasting condition.  Access site was prepped and draped in standard sterile fashion.  Lidocaine was injected and arterial access was obtained by percutaneous anterior wall puncture technique.  A 6 Greek arterial sheath was placed in the right femoral artery using a modified Seldinger technique.  Selective coronary arteriography was performed using the Susi technique with a 6 Greek 4 curved Susi right catheter and a 6 Greek 4 curved Susi left catheter.  Nonionic contrast was used and was injected manually.  Left heart pressures were obtained.  No left ventriculogram was performed.  Following the procedure the patient sheath was Tegaderm in place and will be pulled in recovery.  There were no complications. Contrast: 50 ml Hemodynamic Findings: LV pressure: 90/2/4 mmHg, on pull back no gradient was recorded across the aortic valve. Ao pressure: 90/60 mmHg Left ventriculography: Not performed Angiographic Findings: RCA: The right coronary artery is codominant the posterior circulation and gives rise to a small PDA as well as 2 RV marginal branches.  The right coronary artery and its branches contain no disease. LMCA: The left main coronary artery gives rise to the LAD and circumflex vessels and is free of disease. Circumflex: Circumflex coronary artery is codominant for the posterior circulation and gives rise to a tiny first obtuse marginal branch large second obtuse marginal branch small fourth obtuse marginal  branch and the PDA.  The circumflex and its branches contain no disease. LAD: The LAD gives rise to a small first diagonal branch small second diagonal branch a large third diagonal branch small fourth diagonal branch and terminates as a small apical recurrent branch.  The LAD and its branches contain no disease.     XR Chest 1 View    Result Date: 8/19/2022  XR CHEST 1 VW-  Date of Exam: 8/19/2022 10:50 AM  Indication: SOA triage protocol.  Comparison Exams: August 4, 2022  Technique: Single AP chest radiograph  FINDINGS: There is a mild opacity in the right lower lung. The heart and mediastinal contours appear normal. The pulmonary vasculature appears normal. The osseous structures appear intact.      Mild opacity within right lower lung, which could reflect atelectasis or pneumonia.  This report was finalized on 8/19/2022 11:15 AM by Onur Baires MD.      CT Angiogram Chest    Result Date: 8/19/2022  DATE OF EXAM: 8/19/2022 2:30 PM  PROCEDURE: CT ANGIOGRAM CHEST-  INDICATIONS: SOA, multiple physician visists  COMPARISON: No comparisons available.  TECHNIQUE: Contiguous axial imaging was obtained from the thoracic inlet through the upper abdomen following the intravenous administration of 100 mL of Isovue 370. Reconstructed coronal and sagittal images were also obtained. Automated exposure control and iterative reconstruction methods were used.  The radiation dose reduction device was turned on for each scan per the ALARA (As Low as Reasonably Achievable) protocol.  FINDINGS: There is no pathologic axillary adenopathy or other worrisome body wall soft tissue finding in the chest. No acute findings are present in the partially characterized upper abdomen. There are are moderate to large bilateral pleural effusions. There is no significant pericardial effusion. There is no pathologic mediastinal lymphadenopathy. Nonaneurysmal thoracic aorta. Evaluation of the osseous structures demonstrates no evidence of acute  fracture or aggressive osseous lesion. Evaluation of the lung fields demonstrates diffuse interlobular septal thickening with intervening groundglass opacity consistent with pulmonary edema. There is no focal consolidation or suspicious pulmonary nodularity. The pulmonary arteries are well-opacified, without focal filling defect concerning for acute pulmonary embolus.      Moderate to severe pulmonary edema with moderate to large bilateral pleural effusions present. There is no acute pulmonary embolus.  This report was finalized on 8/19/2022 2:44 PM by Jamison Hicks.                Results for orders placed during the hospital encounter of 08/19/22    Adult Transthoracic Echo Complete W/ Cont if Necessary Per Protocol    Interpretation Summary  · Left ventricular ejection fraction appears to be less than 20%. Left ventricular systolic function is severely decreased.  · Left ventricular diastolic function is consistent with (grade II w/high LAP) pseudonormalization.  · Severe mitral valve regurgitation is present.  · The left ventricular cavity is moderately dilated.  · Left atrial volume is moderately increased.  · Estimated right ventricular systolic pressure from tricuspid regurgitation is moderately elevated (45-55 mmHg). Calculated right ventricular systolic pressure from tricuspid regurgitation is 51 mmHg.  · Mildly reduced right ventricular systolic function noted.      Plan for Follow-up of Pending Labs/Results:     Discharge Details        Discharge Medications      New Medications      Instructions Start Date   bumetanide 0.5 MG tablet  Commonly known as: BUMEX   0.5 mg, Oral, Daily      digoxin 250 MCG tablet  Commonly known as: LANOXIN   250 mcg, Oral, Daily Digoxin      ivabradine HCl 5 MG tablet tablet  Commonly known as: CORLANOR   5 mg, Oral, 2 Times Daily With Meals      sacubitril-valsartan 24-26 MG tablet  Commonly known as: ENTRESTO   Take 0.5 tablet by mouth Every 12 (Twelve) Hours.          Continue These Medications      Instructions Start Date   albuterol sulfate  (90 Base) MCG/ACT inhaler  Commonly known as: PROVENTIL HFA;VENTOLIN HFA;PROAIR HFA   2 puffs, Inhalation, 4 Times Daily - RT         Stop These Medications    promethazine-dextromethorphan 6.25-15 MG/5ML solution  Commonly known as: PROMETHAZINE-DM            Allergies   Allergen Reactions   • Amoxicillin Swelling         Discharge Disposition:      Diet:  Hospital:  Diet Order   Procedures   • Diet Regular; Consistent Carbohydrate, Cardiac       Activity:      Restrictions or Other Recommendations:         CODE STATUS:    Code Status and Medical Interventions:   Ordered at: 08/22/22 1737     Level Of Support Discussed With:    Patient     Code Status (Patient has no pulse and is not breathing):    CPR (Attempt to Resuscitate)     Medical Interventions (Patient has pulse or is breathing):    Full       Future Appointments   Date Time Provider Department Center   8/24/2022 10:00 AM Nestor Henderson DO [facility]   8/30/2022  9:00 AM Jamia Loredo APRN [facility]   9/22/2022  9:30 AM Cj Wallace MD [facility]                 HEMALATHA Antoine  08/24/22      Time Spent on Discharge:  I spent  40  minutes on this discharge activity which included: face-to-face encounter with the patient, reviewing the data in the system, coordination of the care with the nursing staff as well as consultants, documentation, and entering orders.

## 2022-08-24 NOTE — CASE MANAGEMENT/SOCIAL WORK
Case Management Discharge Note      Final Note: Patient remained hospitalized last night.  Patient's prior authorization has not been approved yet, but Dr. Tom office is providing patient with 3 months of the medication.  A primary care provider has been set up for August 29 at 11 AM and the appointment is on her discharge paperwork.  Patient will need to arrive 30 minutes early for new patient admission, and a note has been sent to the provider that a BMP test will need to be performed at that appointment.         Selected Continued Care - Admitted Since 8/19/2022     Destination    No services have been selected for the patient.              Durable Medical Equipment    No services have been selected for the patient.              Dialysis/Infusion    No services have been selected for the patient.              Home Medical Care    No services have been selected for the patient.              Therapy    No services have been selected for the patient.              Community Resources    No services have been selected for the patient.              Community & DME    No services have been selected for the patient.                       Final Discharge Disposition Code: 01 - home or self-care

## 2022-08-24 NOTE — PROGRESS NOTES
"[facility] Cardiology Daily Note       LOS: 5 days   Patient Care Team:  Provider, No Known as PCP - General    Chief Complaint: Cardiomyopathy/systolic heart failure    Subjective     Subjective: No acute issues overnight. No complaints this AM. V/S stable. Sodium slightly low.     Review of Systems:   As above.    Medications:  bumetanide, 0.5 mg, Oral, Daily  digoxin, 250 mcg, Oral, Daily  ivabradine HCl, 5 mg, Oral, BID With Meals  pharmacy consult - [place], , Does not apply, Daily  sacubitril-valsartan, 0.5 tablet, Oral, Q12H  sodium chloride, 10 mL, Intravenous, Q12H        Objective     Vital Sign Min/Max for last 24 hours  Temp  Min: 98.2 °F (36.8 °C)  Max: 98.5 °F (36.9 °C)   BP  Min: 86/54  Max: 106/75   Pulse  Min: 88  Max: 120   Resp  Min: 18  Max: 18   No data recorded   No data recorded   Weight  Min: 89.1 kg (196 lb 6.4 oz)  Max: 89.1 kg (196 lb 6.4 oz)      Intake/Output Summary (Last 24 hours) at 8/24/2022 0820  Last data filed at 8/23/2022 1700  Gross per 24 hour   Intake 720 ml   Output --   Net 720 ml        Flowsheet Rows    Flowsheet Row First Filed Value   Admission Height 167.6 cm (66\") Documented at 08/19/2022 1025   Admission Weight 90.3 kg (199 lb) Documented at 08/19/2022 1025          Physical Exam:    General: Alert and oriented.   Cardiovascular: Heart has a nondisplaced focal PMI.  Tachycardic regular rate and rhythm without murmur heard  Lungs: Clear without rales or wheezes. Equal expansion is noted.   Abdomen: Soft, nontender.  Extremities: Show no edema. Right groin looks good.  No hematoma or bruit.  Skin: warm and dry.     Results Review:    I reviewed the patient's new clinical results.  EKG:  Tele: Sinus tach    Labs:    Results from last 7 days   Lab Units 08/24/22  0559 08/22/22  0529 08/21/22  0549 08/20/22  0612 08/19/22  1050   SODIUM mmol/L 134* 136 139 136 140   POTASSIUM mmol/L 4.7 4.6 4.0 4.7 4.4   CHLORIDE mmol/L 100 101 103 100 107   CO2 mmol/L 23.0 " 26.0 27.0 27.0 26.0   BUN mg/dL 17 15 18 16 13   CREATININE mg/dL 0.80 0.76 0.79 0.85 0.63   CALCIUM mg/dL 9.1 8.8 8.8 8.7 8.9   BILIRUBIN mg/dL  --   --   --  0.3 0.3   ALK PHOS U/L  --   --   --  93 88   ALT (SGPT) U/L  --   --   --  45* 48*   AST (SGOT) U/L  --   --   --  50* 56*   GLUCOSE mg/dL 109* 90 107* 144* 108*     Results from last 7 days   Lab Units 08/24/22  0559 08/22/22  0529 08/21/22  0549   WBC 10*3/mm3 14.08* 13.71* 14.87*   HEMOGLOBIN g/dL 14.9 14.2 13.9   HEMATOCRIT % 43.9 43.7 42.8   PLATELETS 10*3/mm3 530* 526* 555*     Lab Results   Component Value Date    TROPONINT <0.010 08/20/2022    TROPONINT <0.010 08/19/2022     No results found for: CHOL  No results found for: TRIG  No results found for: HDL  No components found for: LDLCALC  No results found for: INR, PROTIME      Ejection Fraction:    Assessment   Assessment:    1. Systolic heart failure, acute on chronic   1. Echo 8/19/2022, RDS: EF 20%, grade 2 diastolic dysfunction, severe MR, LV moderately dilated, LA moderately dilated, RVSP 45 to 55 mmHg, mildly reduced right ventricular systolic function.  2. Previous methamphetamine use  3. Normal coronary arteries by catheterization 8/22/2022  2. Mitral regurgitation  3. Reported history of COPD  asthma         Plan:    1. Digoxin 250 mcg daily. Unable to add BB due to hypotension.   2. Continue Entresto 24-26 1/2 tablet twice daily  3. Low-sodium diet  4. Strict I's and O's  5. Daily weights  6. Continue oral Bumex. Sodium borderline low. Repeat BMP early next week.    7. Continue Corlanor for rate control. Unable to add BB due to hypotension   8. May be discharged home once LifeVest is placed.      Follow up with me or Dr. Tom in 4-6 weeks.     Electronically signed by HEMALATHA Madden, 08/24/22, 8:20 AM EDT.

## 2022-08-24 NOTE — OUTREACH NOTE
Prep Survey    Flowsheet Row Responses   [facility] patient discharged from? [place]   Is LACE score < 7 ? No   Emergency Room discharge w/ pulse ox? No   Eligibility [facility]   Date of Admission 08/19/22   Date of Discharge 08/24/22   Discharge Disposition Home or Self Care   Discharge diagnosis Decompensated heart failure    Does the patient have one of the following disease processes/diagnoses(primary or secondary)? CHF   Does the patient have Home health ordered? No   Is there a DME ordered? No   Prep survey completed? Yes          MIALDYS PRAJAPATI - Registered Nurse

## 2022-08-25 ENCOUNTER — TRANSITIONAL CARE MANAGEMENT TELEPHONE ENCOUNTER (OUTPATIENT)
Dept: CALL CENTER | Facility: HOSPITAL | Age: 37
End: 2022-08-25

## 2022-08-25 NOTE — OUTREACH NOTE
Call Center TCM Note    Flowsheet Row Responses   [facility] [place] patient discharged from? [place]   Does the patient have one of the following disease processes/diagnoses(primary or secondary)? CHF   TCM attempt successful? Yes   Call start time 1712   Call end time 1718   Discharge diagnosis Decompensated heart failure    Is patient permission given to speak with other caregiver? Yes   Person spoke with today (if not patient) and relationship patient   Meds reviewed with patient/caregiver? Yes   Is the patient having any side effects they believe may be caused by any medication additions or changes? No   Does the patient have all medications ordered at discharge? Yes   Is the patient taking all medications as directed (includes completed medication regime)? Yes   Does the patient have a primary care provider?  Yes   Does the patient have an appointment with their PCP within 7 days of discharge? Yes   Comments regarding PCP Has a new PCP appt on 8/29/2022 with [place]. Confirmed appt with patient.    Has the patient kept scheduled appointments due by today? N/A   Psychosocial issues? No   Did the patient receive a copy of their discharge instructions? Yes   Nursing interventions Reviewed instructions with patient   What is the patient's perception of their health status since discharge? Improving   Nursing interventions Nurse provided patient education   Is the patient weighing daily? Yes   Does the patient have scales? Yes   Daily weight interventions Education provided on importance of daily weight   Is the patient able to teach back Heart Failure diet management? Yes   Is the patient able to teach back Heart Failure Zones? Yes   Is the patient able to teach back signs and symptoms of worsening condition? (i.e. weight gain, shortness of air, etc.) Yes   If the patient is a current smoker, are they able to teach back resources for cessation? Not a smoker   Is the patient/caregiver able to teach back the  hierarchy of who to call/visit for symptoms/problems? PCP, Specialist, Home health nurse, Urgent Care, ED, 911 Yes   TCM call completed? Yes   Wrap up additional comments Encouraged patient to weigh daily. Keep followups. She reports she is doing better.            Renaldo Mojica RN    8/25/2022, 17:18 EDT

## 2022-08-26 ENCOUNTER — NURSE TRIAGE (OUTPATIENT)
Dept: CALL CENTER | Facility: HOSPITAL | Age: 37
End: 2022-08-26

## 2022-08-26 NOTE — TELEPHONE ENCOUNTER
Told her to call Cardiology to tell of rash on back under vest. She is taking benadryl and having rash and mod. To sever itching on back under vest.   Reason for Disposition  • [1] Rash is painful to touch AND [2] fever    Additional Information  • Negative: [1] Sudden onset of rash (within last 2 hours) AND [2] difficulty breathing or swallowing  • Negative: Sounds like a life-threatening emergency to the triager  • Negative: Poison ivy, oak, or sumac contact suspected  • Negative: Insect bite(s) suspected  • Negative: Ringworm suspected (i.e., round pink patch, sometimes looks like ring, usually 1/2 to 1 inch [12-25 mm],  in size, slowly increasing in size)  • Negative: Athlete's Foot suspected (i.e., itchy rash between the toes)  • Negative: Jock Itch suspected (i.e., itchy rash on inner thighs near genital area)  • Negative: Wound infection suspected (i.e., pain, spreading redness, or pus; in a cut, puncture, scrape or sutured wound)  • Negative: Impetigo suspected  (i.e., painless infected superficial small sores, less than 1 inch or 2.5 cm, often covered by a soft, yellow-brown scab or crust; sometimes occurring near nasal openings)  • Negative: Shingles suspected (i.e., painful rash, multiple small blisters grouped together in one area of body; dermatomal distribution)  • Negative: Rash of external female genital area (vulva)  • Negative: Rash of penis or scrotum  • Negative: Small spot, skin growth, or mole  • Negative: Sores or skin ulcer, not a rash  • Negative: Localized lump (or swelling) without redness or rash  • Negative: [1] Localized purple or blood-colored spots or dots AND [2] not from injury or friction AND [3] fever  • Negative: Patient sounds very sick or weak to the triager  • Negative: [1] Red area or streak AND [2] fever  • Negative: [1] Looks infected (spreading redness, pus) AND [2] large red area (> 2 in. or 5 cm)  • Negative: [1] Looks infected (spreading redness, pus) AND [2] diabetes  "mellitus or weak immune system (e.g., HIV positive, cancer chemo, splenectomy, organ transplant, chronic steroids)  • Negative: [1] Localized purple or blood-colored spots or dots AND [2] not from injury or friction AND [3] no fever    Answer Assessment - Initial Assessment Questions  1. APPEARANCE of RASH: \"Describe the rash.\"       Red rash all on back under vest  2. LOCATION: \"Where is the rash located?\"      back  3. NUMBER: \"How many spots are there?\"       Red dots  4. SIZE: \"How big are the spots?\" (Inches, centimeters or compare to size of a coin)       Covers under vest feels bumps  5. ONSET: \"When did the rash start?\"       Started as soon as it was it on 08/23/2022  6. ITCHING: \"Does the rash itch?\" If Yes, ask: \"How bad is the itch?\"  (Scale 1-10; or mild, moderate, severe)      Itching, moderate  7. PAIN: \"Does the rash hurt?\" If Yes, ask: \"How bad is the pain?\"  (Scale 1-10; or mild, moderate, severe)      itches  8. OTHER SYMPTOMS: \"Do you have any other symptoms?\" (e.g., fever)      itching  9. PREGNANCY: \"Is there any chance you are pregnant?\" \"When was your last menstrual period?\"      no    Protocols used: RASH OR REDNESS - LOCALIZED-ADULT-AH      "

## 2022-08-29 ENCOUNTER — OFFICE VISIT (OUTPATIENT)
Dept: FAMILY MEDICINE CLINIC | Facility: CLINIC | Age: 37
End: 2022-08-29

## 2022-08-29 ENCOUNTER — LAB (OUTPATIENT)
Dept: LAB | Facility: HOSPITAL | Age: 37
End: 2022-08-29

## 2022-08-29 VITALS
HEIGHT: 66 IN | SYSTOLIC BLOOD PRESSURE: 126 MMHG | WEIGHT: 199 LBS | TEMPERATURE: 98.3 F | OXYGEN SATURATION: 98 % | BODY MASS INDEX: 31.98 KG/M2 | HEART RATE: 112 BPM | DIASTOLIC BLOOD PRESSURE: 78 MMHG

## 2022-08-29 DIAGNOSIS — I50.40 HEART FAILURE WITH REDUCED EJECTION FRACTION AND DIASTOLIC DYSFUNCTION: ICD-10-CM

## 2022-08-29 DIAGNOSIS — R21 RASH: ICD-10-CM

## 2022-08-29 DIAGNOSIS — I50.40 HEART FAILURE WITH REDUCED EJECTION FRACTION AND DIASTOLIC DYSFUNCTION: Primary | ICD-10-CM

## 2022-08-29 LAB
ANION GAP SERPL CALCULATED.3IONS-SCNC: 13.8 MMOL/L (ref 5–15)
BUN SERPL-MCNC: 12 MG/DL (ref 6–20)
BUN/CREAT SERPL: 16.4 (ref 7–25)
CALCIUM SPEC-SCNC: 9.6 MG/DL (ref 8.6–10.5)
CHLORIDE SERPL-SCNC: 99 MMOL/L (ref 98–107)
CO2 SERPL-SCNC: 25.2 MMOL/L (ref 22–29)
CREAT SERPL-MCNC: 0.73 MG/DL (ref 0.57–1)
EGFRCR SERPLBLD CKD-EPI 2021: 108.1 ML/MIN/1.73
GLUCOSE SERPL-MCNC: 61 MG/DL (ref 65–99)
POTASSIUM SERPL-SCNC: 4.3 MMOL/L (ref 3.5–5.2)
SODIUM SERPL-SCNC: 138 MMOL/L (ref 136–145)

## 2022-08-29 PROCEDURE — 80048 BASIC METABOLIC PNL TOTAL CA: CPT

## 2022-08-29 PROCEDURE — 99213 OFFICE O/P EST LOW 20 MIN: CPT | Performed by: PHYSICIAN ASSISTANT

## 2022-08-29 RX ORDER — ONDANSETRON 4 MG/1
4 TABLET, ORALLY DISINTEGRATING ORAL EVERY 8 HOURS PRN
COMMUNITY
Start: 2022-08-15 | End: 2022-12-07

## 2022-08-29 RX ORDER — TRIAMCINOLONE ACETONIDE 1 MG/ML
LOTION TOPICAL 2 TIMES DAILY
Qty: 60 ML | Refills: 3 | Status: SHIPPED | OUTPATIENT
Start: 2022-08-29 | End: 2023-01-03

## 2022-08-29 RX ORDER — HYDROXYZINE HYDROCHLORIDE 25 MG/1
25 TABLET, FILM COATED ORAL 3 TIMES DAILY PRN
Qty: 30 TABLET | Refills: 2 | Status: SHIPPED | OUTPATIENT
Start: 2022-08-29 | End: 2022-10-17 | Stop reason: SDUPTHER

## 2022-08-30 ENCOUNTER — OFFICE VISIT (OUTPATIENT)
Dept: CARDIOLOGY | Facility: HOSPITAL | Age: 37
End: 2022-08-30

## 2022-08-30 VITALS
HEART RATE: 106 BPM | OXYGEN SATURATION: 99 % | WEIGHT: 199.5 LBS | RESPIRATION RATE: 20 BRPM | DIASTOLIC BLOOD PRESSURE: 58 MMHG | BODY MASS INDEX: 32.06 KG/M2 | TEMPERATURE: 97.2 F | SYSTOLIC BLOOD PRESSURE: 98 MMHG | HEIGHT: 66 IN

## 2022-08-30 DIAGNOSIS — J44.9 CHRONIC OBSTRUCTIVE PULMONARY DISEASE, UNSPECIFIED COPD TYPE: ICD-10-CM

## 2022-08-30 DIAGNOSIS — I50.22 CHRONIC SYSTOLIC (CONGESTIVE) HEART FAILURE: Primary | ICD-10-CM

## 2022-08-30 DIAGNOSIS — I34.0 NONRHEUMATIC MITRAL VALVE REGURGITATION: ICD-10-CM

## 2022-08-30 PROCEDURE — 99214 OFFICE O/P EST MOD 30 MIN: CPT | Performed by: NURSE PRACTITIONER

## 2022-09-01 ENCOUNTER — READMISSION MANAGEMENT (OUTPATIENT)
Dept: CALL CENTER | Facility: HOSPITAL | Age: 37
End: 2022-09-01

## 2022-09-01 NOTE — OUTREACH NOTE
CHF Week 2 Survey    Flowsheet Row Responses   [facility] patient discharged from? [facility]   Does the patient have one of the following disease processes/diagnoses(primary or secondary)? CHF   Week 2 attempt successful? Yes   Call start time 1442   Call end time 1443   Discharge diagnosis Decompensated heart failure    Person spoke with today (if not patient) and relationship Kristi-sister   Meds reviewed with patient/caregiver? Yes   Is the patient taking all medications as directed (includes completed medication regime)? Yes   Has the patient kept scheduled appointments due by today? Yes   Pulse Ox monitoring None  [unsure]   What is the patient's perception of their health status since discharge? Improving   CHF Week 2 call completed? Yes   Graduated/Revoked comments Sister's number is listed first. Call was short as there was a lot of commotion in the background.    Call end time 1443          NICOLE RODRIGUEZ - Registered Nurse

## 2022-09-02 ENCOUNTER — PATIENT ROUNDING (BHMG ONLY) (OUTPATIENT)
Dept: CARDIOLOGY | Facility: HOSPITAL | Age: 37
End: 2022-09-02

## 2022-09-02 NOTE — PROGRESS NOTES
September 2, 2022    Hello, may I speak with Herminia Hernandez?    My name is Yanira      I am  with [facility] CARDIOLOGY  [address]  [facility] [address] [phone].    Before we get started may I verify your date of birth? 1985    I am calling to officially welcome you to our practice and ask about your recent visit. Is this a good time to talk? Yes    Tell me about your visit with us. What things went well? Good       We're always looking for ways to make our patients' experiences even better. Do you have recommendations on ways we may improve?  No    Overall were you satisfied with your first visit to our practice? Yes       I appreciate you taking the time to speak with me today. Is there anything else I can do for you?No      Thank you, and have a great day.

## 2022-09-21 RX ORDER — DIGOXIN 250 MCG
250 TABLET ORAL
Qty: 30 TABLET | Refills: 0 | Status: SHIPPED | OUTPATIENT
Start: 2022-09-21 | End: 2022-10-21 | Stop reason: SDUPTHER

## 2022-09-21 RX ORDER — BUMETANIDE 0.5 MG/1
0.5 TABLET ORAL DAILY
Qty: 30 TABLET | Refills: 4 | Status: SHIPPED | OUTPATIENT
Start: 2022-09-21 | End: 2022-10-17 | Stop reason: SDUPTHER

## 2022-10-17 DIAGNOSIS — R21 RASH: ICD-10-CM

## 2022-10-19 RX ORDER — BUMETANIDE 0.5 MG/1
0.5 TABLET ORAL DAILY
Qty: 30 TABLET | Refills: 4 | Status: SHIPPED | OUTPATIENT
Start: 2022-10-19 | End: 2022-12-07

## 2022-10-19 RX ORDER — HYDROXYZINE HYDROCHLORIDE 25 MG/1
25 TABLET, FILM COATED ORAL 3 TIMES DAILY PRN
Qty: 30 TABLET | Refills: 2 | Status: SHIPPED | OUTPATIENT
Start: 2022-10-19 | End: 2022-12-07

## 2022-10-21 ENCOUNTER — OFFICE VISIT (OUTPATIENT)
Dept: CARDIOLOGY | Facility: HOSPITAL | Age: 37
End: 2022-10-21

## 2022-10-21 ENCOUNTER — LAB (OUTPATIENT)
Dept: LAB | Facility: HOSPITAL | Age: 37
End: 2022-10-21

## 2022-10-21 VITALS
WEIGHT: 197 LBS | DIASTOLIC BLOOD PRESSURE: 57 MMHG | RESPIRATION RATE: 16 BRPM | HEART RATE: 68 BPM | OXYGEN SATURATION: 98 % | BODY MASS INDEX: 31.66 KG/M2 | HEIGHT: 66 IN | TEMPERATURE: 96.6 F | SYSTOLIC BLOOD PRESSURE: 122 MMHG

## 2022-10-21 DIAGNOSIS — I50.22 CHRONIC SYSTOLIC (CONGESTIVE) HEART FAILURE: ICD-10-CM

## 2022-10-21 DIAGNOSIS — I34.0 NONRHEUMATIC MITRAL VALVE REGURGITATION: ICD-10-CM

## 2022-10-21 DIAGNOSIS — I50.22 CHRONIC SYSTOLIC (CONGESTIVE) HEART FAILURE: Primary | ICD-10-CM

## 2022-10-21 DIAGNOSIS — Z79.899 LONG-TERM USE OF HIGH-RISK MEDICATION: ICD-10-CM

## 2022-10-21 PROCEDURE — 80162 ASSAY OF DIGOXIN TOTAL: CPT

## 2022-10-21 PROCEDURE — 99214 OFFICE O/P EST MOD 30 MIN: CPT | Performed by: NURSE PRACTITIONER

## 2022-10-21 PROCEDURE — 80048 BASIC METABOLIC PNL TOTAL CA: CPT

## 2022-10-21 PROCEDURE — 36415 COLL VENOUS BLD VENIPUNCTURE: CPT

## 2022-10-21 RX ORDER — OMEPRAZOLE 20 MG/1
20 CAPSULE, DELAYED RELEASE ORAL DAILY
Qty: 30 CAPSULE | Refills: 0 | Status: SHIPPED | OUTPATIENT
Start: 2022-10-21 | End: 2022-11-21

## 2022-10-21 RX ORDER — METOPROLOL SUCCINATE 25 MG/1
12.5 TABLET, EXTENDED RELEASE ORAL DAILY
Qty: 30 TABLET | Refills: 3 | Status: SHIPPED | OUTPATIENT
Start: 2022-10-21 | End: 2022-12-07 | Stop reason: SDUPTHER

## 2022-10-21 RX ORDER — DIGOXIN 250 MCG
250 TABLET ORAL
Qty: 30 TABLET | Refills: 3 | Status: SHIPPED | OUTPATIENT
Start: 2022-10-21 | End: 2022-10-28 | Stop reason: DRUGHIGH

## 2022-10-22 LAB
ANION GAP SERPL CALCULATED.3IONS-SCNC: 13.1 MMOL/L (ref 5–15)
BUN SERPL-MCNC: 10 MG/DL (ref 6–20)
BUN/CREAT SERPL: 12.8 (ref 7–25)
CALCIUM SPEC-SCNC: 9.1 MG/DL (ref 8.6–10.5)
CHLORIDE SERPL-SCNC: 99 MMOL/L (ref 98–107)
CO2 SERPL-SCNC: 23.9 MMOL/L (ref 22–29)
CREAT SERPL-MCNC: 0.78 MG/DL (ref 0.57–1)
DIGOXIN SERPL-MCNC: 1.4 NG/ML (ref 0.6–1.2)
EGFRCR SERPLBLD CKD-EPI 2021: 99.8 ML/MIN/1.73
GLUCOSE SERPL-MCNC: 77 MG/DL (ref 65–99)
POTASSIUM SERPL-SCNC: 4.3 MMOL/L (ref 3.5–5.2)
SODIUM SERPL-SCNC: 136 MMOL/L (ref 136–145)

## 2022-10-24 NOTE — PROGRESS NOTES
"Chief Complaint  Follow-up and Congestive Heart Failure    Subjective    History of Present Illness {CC  Problem List  Visit  Diagnosis   Encounters  Notes  Medications  Labs  Result Review Imaging  Media :23}       History of Present Illness   37 year old female presents to the office today for ongoing evaluation of her chronic systolic heart failure. She is wearing her life vest and denies any inappropriate shocks or abnormal alarms. Notes compliance with her medications. Patient was hospitalized at [facility] 8/19/2022 to 8/24/2022 with respiratory distress.  She has a history of COPD.  Echo shows a depressed EF of 20%.  Left heart cath 8/22 showed normal coronaries, EF 20% with grade 2 diastolic dysfunction.  Echo showed severe MR, moderate LA and LV dilation, RVSP 45 to 55mmHg.  Corlanor and digoxin were started for rate control beta-blocker was not able to be initiated secondary to hypotension. Corlanor has not been started due to insurance constraints  Entresto was initiated with p.o. Bumex.  UDS was positive for amphetamines and opiates.      Objective     Vital Signs:   Vitals:    10/21/22 1153   BP: 122/57   BP Location: Left arm   Patient Position: Sitting   Cuff Size: Adult   Pulse: 68   Resp: 16   Temp: 96.6 °F (35.9 °C)   TempSrc: Temporal   SpO2: 98%   Weight: 89.4 kg (197 lb)   Height: 167.6 cm (66\")     Body mass index is 31.8 kg/m².  Physical Exam  Vitals and nursing note reviewed.   Constitutional:       Appearance: Normal appearance.   HENT:      Head: Normocephalic.   Eyes:      Pupils: Pupils are equal, round, and reactive to light.   Cardiovascular:      Rate and Rhythm: Normal rate and regular rhythm.      Pulses: Normal pulses.      Heart sounds: Normal heart sounds. No murmur heard.  Pulmonary:      Effort: Pulmonary effort is normal.      Breath sounds: Normal breath sounds.   Abdominal:      General: Bowel sounds are normal.      Palpations: Abdomen is soft. "   Musculoskeletal:         General: Normal range of motion.      Cervical back: Normal range of motion.      Right lower leg: No edema.      Left lower leg: No edema.   Skin:     General: Skin is warm and dry.      Capillary Refill: Capillary refill takes less than 2 seconds.   Neurological:      Mental Status: She is alert and oriented to person, place, and time.   Psychiatric:         Mood and Affect: Mood normal.         Thought Content: Thought content normal.              Result Review  Data Reviewed:{ Labs  Result Review  Imaging  Med Tab  Media :23}   Adult Transthoracic Echo Complete W/ Cont if Necessary Per Protocol (08/19/2022 17:02)  Cardiac Catheterization/Vascular Study (08/22/2022 16:47)  ECG 12 Lead (08/22/2022 17:55)           Assessment and Plan {CC Problem List  Visit Diagnosis  ROS  Review (Popup)  Health Maintenance  Quality  BestPractice  Medications  SmartSets  SnapShot Encounters  Media :23}   1. Chronic systolic (congestive) heart failure (HCC)  Begin toprol 12.5 mg daily  Continue digoxin, entresto  - Adult Transthoracic Echo Complete W/ Cont if Necessary Per Protocol; Future 3months from initial echo   - Basic Metabolic Panel; Future  Continue life vest   2. Nonrheumatic mitral valve regurgitation    - Adult Transthoracic Echo Complete W/ Cont if Necessary Per Protocol; Future  - Basic Metabolic Panel; Future    3. Long-term use of high-risk medication  - Digoxin Level; Future    Schedule follow up with Dr Tom in near future   Follow Up {Instructions Charge Capture  Follow-up Communications :23}   No follow-ups on file.    Patient was given instructions and counseling regarding her condition or for health maintenance advice. Please see specific information pulled into the AVS if appropriate.  Patient was instructed to call the [facility] with any questions, concerns, or worsening symptoms.

## 2022-10-26 ENCOUNTER — TELEPHONE (OUTPATIENT)
Dept: CARDIOLOGY | Facility: HOSPITAL | Age: 37
End: 2022-10-26

## 2022-10-28 ENCOUNTER — TELEPHONE (OUTPATIENT)
Dept: CARDIOLOGY | Facility: HOSPITAL | Age: 37
End: 2022-10-28

## 2022-10-28 RX ORDER — DIGOXIN 125 MCG
125 TABLET ORAL DAILY
Qty: 90 TABLET | Refills: 3 | Status: SHIPPED | OUTPATIENT
Start: 2022-10-28

## 2022-11-21 ENCOUNTER — HOSPITAL ENCOUNTER (OUTPATIENT)
Dept: CARDIOLOGY | Facility: HOSPITAL | Age: 37
Discharge: HOME | End: 2022-11-21
Admitting: NURSE PRACTITIONER

## 2022-11-21 VITALS — HEIGHT: 66 IN | WEIGHT: 197 LBS | BODY MASS INDEX: 31.66 KG/M2

## 2022-11-21 DIAGNOSIS — I34.0 NONRHEUMATIC MITRAL VALVE REGURGITATION: ICD-10-CM

## 2022-11-21 DIAGNOSIS — I50.22 CHRONIC SYSTOLIC (CONGESTIVE) HEART FAILURE: ICD-10-CM

## 2022-11-21 LAB
ASCENDING AORTA: 2.8 CM
BH CV ECHO MEAS - AO MAX PG: 6 MMHG
BH CV ECHO MEAS - AO MEAN PG: 3.6 MMHG
BH CV ECHO MEAS - AO ROOT DIAM: 2.8 CM
BH CV ECHO MEAS - AO V2 MAX: 122.8 CM/SEC
BH CV ECHO MEAS - AO V2 VTI: 19.1 CM
BH CV ECHO MEAS - AVA(I,D): 1.77 CM2
BH CV ECHO MEAS - EDV(CUBED): 158.4 ML
BH CV ECHO MEAS - EDV(MOD-SP2): 181 ML
BH CV ECHO MEAS - EDV(MOD-SP4): 212 ML
BH CV ECHO MEAS - EF(MOD-BP): 31.5 %
BH CV ECHO MEAS - EF(MOD-SP2): 26.5 %
BH CV ECHO MEAS - EF(MOD-SP4): 35.8 %
BH CV ECHO MEAS - ESV(CUBED): 130.2 ML
BH CV ECHO MEAS - ESV(MOD-SP2): 133 ML
BH CV ECHO MEAS - ESV(MOD-SP4): 136 ML
BH CV ECHO MEAS - FS: 6.3 %
BH CV ECHO MEAS - IVS/LVPW: 0.92 CM
BH CV ECHO MEAS - IVSD: 0.9 CM
BH CV ECHO MEAS - LA DIMENSION: 2.7 CM
BH CV ECHO MEAS - LAT PEAK E' VEL: 6.9 CM/SEC
BH CV ECHO MEAS - LV DIASTOLIC VOL/BSA (35-75): 106.7 CM2
BH CV ECHO MEAS - LV MASS(C)D: 192.3 GRAMS
BH CV ECHO MEAS - LV MAX PG: 1.37 MMHG
BH CV ECHO MEAS - LV MEAN PG: 0.75 MMHG
BH CV ECHO MEAS - LV SYSTOLIC VOL/BSA (12-30): 68.4 CM2
BH CV ECHO MEAS - LV V1 MAX: 58.5 CM/SEC
BH CV ECHO MEAS - LV V1 VTI: 10.4 CM
BH CV ECHO MEAS - LVIDD: 5.4 CM
BH CV ECHO MEAS - LVIDS: 5.1 CM
BH CV ECHO MEAS - LVOT AREA: 3.3 CM2
BH CV ECHO MEAS - LVOT DIAM: 2.04 CM
BH CV ECHO MEAS - LVPWD: 0.9 CM
BH CV ECHO MEAS - MED PEAK E' VEL: 7 CM/SEC
BH CV ECHO MEAS - MR MAX PG: 79 MMHG
BH CV ECHO MEAS - MR MAX VEL: 444.4 CM/SEC
BH CV ECHO MEAS - MR MEAN PG: 51.6 MMHG
BH CV ECHO MEAS - MR MEAN VEL: 338.4 CM/SEC
BH CV ECHO MEAS - MR VTI: 134.9 CM
BH CV ECHO MEAS - MV MAX PG: 3.1 MMHG
BH CV ECHO MEAS - MV MEAN PG: 2.08 MMHG
BH CV ECHO MEAS - MV V2 VTI: 14.8 CM
BH CV ECHO MEAS - MVA(VTI): 2.29 CM2
BH CV ECHO MEAS - PA ACC TIME: 0.13 SEC
BH CV ECHO MEAS - PA PR(ACCEL): 22 MMHG
BH CV ECHO MEAS - PA V2 MAX: 107.8 CM/SEC
BH CV ECHO MEAS - RAP SYSTOLE: 3 MMHG
BH CV ECHO MEAS - RF(MV,LVOT)(1DIAM): 0.67 CM
BH CV ECHO MEAS - SI(MOD-SP2): 24.2 ML/M2
BH CV ECHO MEAS - SI(MOD-SP4): 38.2 ML/M2
BH CV ECHO MEAS - SV(LVOT): 33.9 ML
BH CV ECHO MEAS - SV(MOD-SP2): 48 ML
BH CV ECHO MEAS - SV(MOD-SP4): 76 ML
BH CV ECHO MEAS - TAPSE (>1.6): 2.6 CM
BH CV VAS BP LEFT ARM: NORMAL MMHG
BH CV XLRA - RV BASE: 3.1 CM
BH CV XLRA - RV LENGTH: 5.7 CM
BH CV XLRA - RV MID: 2.9 CM
BH CV XLRA - TDI S': 12.6 CM/SEC
IVRT: 116 MSEC
LEFT ATRIUM VOLUME INDEX: 16.7 ML/M2
LV EF 2D ECHO EST: 30 %
MAXIMAL PREDICTED HEART RATE: 182 BPM
MR PISA EROA: 11 CM2
MV REGURGITANT FRACTION: 14 %
MV REGURGITANT VOLUME: 15 CC
MV VENA CONTRACTA: 0.38 CM
PISA ALIASING VEL: 0.31 M/S
PISA RADIUS: 0.5 CM
STRESS TARGET HR: 155 BPM

## 2022-11-21 PROCEDURE — 93306 TTE W/DOPPLER COMPLETE: CPT | Performed by: INTERNAL MEDICINE

## 2022-11-21 PROCEDURE — 93306 TTE W/DOPPLER COMPLETE: CPT

## 2022-11-21 PROCEDURE — 25010000002 SULFUR HEXAFLUORIDE MICROSPH 60.7-25 MG RECONSTITUTED SUSPENSION: Performed by: NURSE PRACTITIONER

## 2022-11-21 RX ORDER — OMEPRAZOLE 20 MG/1
20 CAPSULE, DELAYED RELEASE ORAL DAILY
Qty: 30 CAPSULE | Refills: 0 | Status: SHIPPED | OUTPATIENT
Start: 2022-11-21 | End: 2022-12-16 | Stop reason: SDUPTHER

## 2022-11-21 RX ADMIN — SULFUR HEXAFLUORIDE 2 ML: KIT at 09:05

## 2022-12-07 ENCOUNTER — OFFICE VISIT (OUTPATIENT)
Dept: CARDIOLOGY | Facility: CLINIC | Age: 37
End: 2022-12-07

## 2022-12-07 VITALS
HEIGHT: 66 IN | SYSTOLIC BLOOD PRESSURE: 110 MMHG | DIASTOLIC BLOOD PRESSURE: 68 MMHG | OXYGEN SATURATION: 100 % | BODY MASS INDEX: 32.62 KG/M2 | WEIGHT: 203 LBS | HEART RATE: 97 BPM

## 2022-12-07 DIAGNOSIS — I34.0 NONRHEUMATIC MITRAL VALVE REGURGITATION: ICD-10-CM

## 2022-12-07 DIAGNOSIS — I50.21 ACUTE SYSTOLIC HEART FAILURE: Primary | ICD-10-CM

## 2022-12-07 DIAGNOSIS — I42.8 NICM (NONISCHEMIC CARDIOMYOPATHY): ICD-10-CM

## 2022-12-07 DIAGNOSIS — R60.9 EDEMA, UNSPECIFIED TYPE: ICD-10-CM

## 2022-12-07 PROCEDURE — 99214 OFFICE O/P EST MOD 30 MIN: CPT | Performed by: INTERNAL MEDICINE

## 2022-12-07 RX ORDER — BUMETANIDE 1 MG/1
1 TABLET ORAL DAILY
Qty: 90 TABLET | Refills: 3 | Status: SHIPPED | OUTPATIENT
Start: 2022-12-07

## 2022-12-07 RX ORDER — BUMETANIDE 0.5 MG/1
0.5 TABLET ORAL DAILY
COMMUNITY
End: 2022-12-07 | Stop reason: SDUPTHER

## 2022-12-07 RX ORDER — METOPROLOL SUCCINATE 25 MG/1
25 TABLET, EXTENDED RELEASE ORAL DAILY
Qty: 90 TABLET | Refills: 3 | Status: SHIPPED | OUTPATIENT
Start: 2022-12-07

## 2022-12-07 NOTE — PROGRESS NOTES
[facility]    Patient ID: Herminia Hernandez is a 37 y.o. female.  : 1985   Contact: [phone]    Encounter date: 2022    PCP: Rocío Bay PA      Chief complaint:   Chief Complaint   Patient presents with   • Chronic systolic (congestive) heart failure (       Problem List:  1. Acute systolic heart failure  a. Echo 2022, RDS: EF 20%, grade 2 diastolic dysfunction, severe MR, LV moderately dilated, LA moderately dilated, RVSP 45 to 55 mmHg, mildly reduced right ventricular systolic function.  b. LHC, 2022 ; normal coronary arteries. Normal L heart pressures.   c. Echo, 2022; EF 30%. Moderate to severe LV systolic dysfunction. Global hypokinesis. Moderate MR. Trace TR.   2. Previous methamphetamine use  3. Mitral regurgitation  4. Reported history of COPD  asthma    Allergies   Allergen Reactions   • Amoxicillin Swelling       Current Medications:    Current Outpatient Medications:   •  albuterol sulfate  (90 Base) MCG/ACT inhaler, Inhale 2 puffs 4 (Four) Times a Day., Disp: 18 g, Rfl: 0  •  bumetanide (BUMEX) 0.5 MG tablet, Take 0.5 mg by mouth Daily., Disp: , Rfl:   •  digoxin (LANOXIN) 125 MCG tablet, Take 1 tablet by mouth Daily., Disp: 90 tablet, Rfl: 3  •  metoprolol succinate XL (TOPROL-XL) 25 MG 24 hr tablet, Take 0.5 tablets by mouth Daily., Disp: 30 tablet, Rfl: 3  •  omeprazole (priLOSEC) 20 MG capsule, TAKE 1 CAPSULE BY MOUTH DAILY, Disp: 30 capsule, Rfl: 0  •  sacubitril-valsartan (ENTRESTO) 24-26 MG tablet, Take 0.5 tablet by mouth Every 12 (Twelve) Hours., Disp: 60 tablet, Rfl: 3  •  triamcinolone (KENALOG) 0.1 % lotion, Apply  topically to the appropriate area as directed 2 (Two) Times a Day., Disp: 60 mL, Rfl: 3    HPI    Herminia Hernandez is a 37 y.o. female who presents today for a follow up of systolic heart failure and cardiac risk factors. Since last visit, patient states that she has been better since hospitalization  "other than fluid retention. She mentions she does not include a lot of salt in her diet. She states that she does swell most of the day all day. Patient does monitor their blood pressure with readings ranging around the normal range with the lowest being 110 mmHg systolic. She asks why she will some time take \"extra breaths\". Patient denies chest pain, shortness of breath, orthopnea, palpitations, dizziness, and syncope.      The following portions of the patient's history were reviewed and updated as appropriate: allergies, current medications and problem list.    Pertinent positives as listed in the HPI.  All other systems reviewed are negative.         Vitals:    12/07/22 0942   BP: 110/68   BP Location: Left arm   Patient Position: Sitting   Pulse: 97   SpO2: 100%   Weight: 92.1 kg (203 lb)   Height: 167.6 cm (66\")       Physical Exam:  General: Alert and oriented.  Neck: Jugular venous pressure is within normal limits. Carotids have normal upstrokes without bruits.   Cardiovascular: Heart has a nondisplaced focal PMI. Regular rate and rhythm. No murmur, gallop or rub.  Lungs: Clear, no rales or wheezes. Equal expansion is noted.   Extremities: Show trace edema.  Skin: Warm and dry.  Neurologic: Nonfocal.     Diagnostic Data (reviewed with patient):   Lab Results   Component Value Date    GLUCOSE 77 10/21/2022    BUN 10 10/21/2022    CREATININE 0.78 10/21/2022    BCR 12.8 10/21/2022     10/21/2022    K 4.3 10/21/2022    CL 99 10/21/2022    CO2 23.9 10/21/2022    CALCIUM 9.1 10/21/2022    ALBUMIN 3.50 08/24/2022    ALKPHOS 93 08/20/2022    AST 50 (H) 08/20/2022    ALT 45 (H) 08/20/2022      Lab Results   Component Value Date    WBC 14.08 (H) 08/24/2022    RBC 4.86 08/24/2022    HGB 14.9 08/24/2022    HCT 43.9 08/24/2022    MCV 90.3 08/24/2022     (H) 08/24/2022      Lab Results   Component Value Date    TSH 3.230 08/19/2022        Procedures      Assessment:    ICD-10-CM ICD-9-CM   1. Acute systolic " heart failure (CMS/[facility])  I50.21 428.21   2. Nonrheumatic mitral valve regurgitation  I34.0 424.0   3. Edema, unspecified type  R60.9 782.3         Plan:  1. ICD procedure was explained to the patient/family extensively. Indications, benefits, risks and alternatives were discussed. The patient understands well, and wishes to proceed to discuss with electrophysiology.  EF has improved from less than 20% up to 30% and MR has improved from severe to moderate.  2. Increase Bumex 0.5 mg to 1 mg daily for fluid retention.   3. BMP to be done in 2 weeks to reassess kidney function.   4. Increase metoprolol XL from 12.5 to 25 mg daily for LV dysfunction  5. Continue on digoxin 125 mcg daily and Entresto 0.5 tablets of 24-26 mg BID for heart rate control and heart failure.  6. Continue all other current medications.  7. F/up in 3 months, sooner if needed.      Scribed for Orquidea Tom MD by Brenda Wallis. 12/7/2022 09:48 EST         I Orquidea Tom MD personally performed the services described in this documentation as scribed by the above individual in my presence, and it is both accurate and complete.    Orquidea Tom MD, [facility]

## 2022-12-16 RX ORDER — OMEPRAZOLE 20 MG/1
20 CAPSULE, DELAYED RELEASE ORAL DAILY
Qty: 30 CAPSULE | Refills: 0 | OUTPATIENT
Start: 2022-12-16

## 2022-12-16 RX ORDER — OMEPRAZOLE 20 MG/1
20 CAPSULE, DELAYED RELEASE ORAL DAILY
Qty: 30 CAPSULE | Refills: 5 | Status: SHIPPED | OUTPATIENT
Start: 2022-12-16

## 2022-12-16 NOTE — TELEPHONE ENCOUNTER
Left message for patient to call back.   Need 14 days for auth due to insurance    Moved patient to the depo for [place].   Non-cardiac medication; please review.

## 2023-01-03 ENCOUNTER — OFFICE VISIT (OUTPATIENT)
Dept: CARDIOLOGY | Facility: CLINIC | Age: 38
End: 2023-01-03
Payer: COMMERCIAL

## 2023-01-03 VITALS
DIASTOLIC BLOOD PRESSURE: 62 MMHG | BODY MASS INDEX: 32.3 KG/M2 | SYSTOLIC BLOOD PRESSURE: 112 MMHG | HEIGHT: 66 IN | HEART RATE: 87 BPM | OXYGEN SATURATION: 98 % | WEIGHT: 201 LBS

## 2023-01-03 DIAGNOSIS — I34.0 NONRHEUMATIC MITRAL VALVE REGURGITATION: Primary | ICD-10-CM

## 2023-01-03 PROCEDURE — 99204 OFFICE O/P NEW MOD 45 MIN: CPT | Performed by: STUDENT IN AN ORGANIZED HEALTH CARE EDUCATION/TRAINING PROGRAM

## 2023-01-03 PROCEDURE — 1159F MED LIST DOCD IN RCRD: CPT | Performed by: STUDENT IN AN ORGANIZED HEALTH CARE EDUCATION/TRAINING PROGRAM

## 2023-01-03 PROCEDURE — 93000 ELECTROCARDIOGRAM COMPLETE: CPT | Performed by: STUDENT IN AN ORGANIZED HEALTH CARE EDUCATION/TRAINING PROGRAM

## 2023-01-03 PROCEDURE — 1160F RVW MEDS BY RX/DR IN RCRD: CPT | Performed by: STUDENT IN AN ORGANIZED HEALTH CARE EDUCATION/TRAINING PROGRAM

## 2023-01-03 NOTE — PROGRESS NOTES
[facility] at [facility]  INITIAL OFFICE CONSULT      Herminia Hernandez  1985  PCP: Rocío Bay PA    SUBJECTIVE:   Herminia Hernandez is a 37 y.o. female seen for a consultation visit regarding the following:     Chief Complaint:   Chief Complaint   Patient presents with   • Acute systolic heart failure (CMS/HCC)   • nonrheaumatic mitral valve regurgitation          Consultation is requested by Orquidea Tom, * for evaluation of Acute systolic heart failure (CMS/HCC) and nonrheaumatic mitral valve regurgitation        History:  37-year-old female seen in consultation today at the request of Dr. Orquidea Tom regarding dilated nonischemic cardiomyopathy and consideration for AICD.  The patient was admitted to [facility] August 20, 2022 and diagnosed with heart failure.  Initially she was felt to have a bronchitis or pneumonia was treated with several rounds of antibiotics and inhalers.  She eventually was admitted and diagnosed with heart failure after CT scan revealed pulmonary edema, proBNP was over 5000.  She subsequently had a left heart catheterization revealing normal coronary arteries.  She had moderate hypotension but was able to tolerate some Entresto, digoxin and eventually some corlanor.  Eventually she was changed to Toprol therapy September 2022.  She is continued on Gonder, therapy as blood pressure allowed until she had a follow-up echocardiogram November 21, 2022.  This revealed EF remains low at 30% and her MR had improved from severe to moderate.      Cardiac PMH: (Old records have been reviewed and summarized below)  1. Acute systolic heart failure  a. Echo 8/19/2022, RDS: EF 20%, grade 2 diastolic dysfunction, severe MR, LV moderately dilated, LA moderately dilated, RVSP 45 to 55 mmHg, mildly reduced right ventricular systolic function.  b. LHC, 08/22/2022 ; normal coronary arteries. Normal L heart pressures.   c. Echo, 11/21/2022; EF 30%.  Moderate to severe LV systolic dysfunction. Global hypokinesis. Moderate MR. Trace TR.   2. Previous methamphetamine use  3. Mitral regurgitation  4. Reported history of COPD  asthma    Past Medical History, Past Surgical History, Family history, Social History, and Medications were all reviewed with the patient today and updated as necessary.     Current Outpatient Medications   Medication Sig Dispense Refill   • albuterol sulfate  (90 Base) MCG/ACT inhaler Inhale 2 puffs 4 (Four) Times a Day. 18 g 0   • bumetanide (BUMEX) 1 MG tablet Take 1 tablet by mouth Daily. 90 tablet 3   • digoxin (LANOXIN) 125 MCG tablet Take 1 tablet by mouth Daily. 90 tablet 3   • metoprolol succinate XL (TOPROL-XL) 25 MG 24 hr tablet Take 1 tablet by mouth Daily. 90 tablet 3   • omeprazole (priLOSEC) 20 MG capsule Take 1 capsule by mouth Daily. 30 capsule 5   • sacubitril-valsartan (ENTRESTO) 24-26 MG tablet Take 0.5 tablet by mouth Every 12 (Twelve) Hours. 60 tablet 3     No current facility-administered medications for this visit.     Allergies   Allergen Reactions   • Amoxicillin Swelling         Past Medical History:   Diagnosis Date   • Asthma    • CHF (congestive heart failure) ([facility]) 5/1/2022   • COPD (chronic obstructive pulmonary disease) ([facility])    • Heart failure ([facility])    • Hypertension 5/2022     Past Surgical History:   Procedure Laterality Date   • CARDIAC CATHETERIZATION N/A 8/22/2022    Procedure: Left Heart Cath;  Surgeon: Orquidea Tom MD;  Location: [facility] CATH INVASIVE LOCATION;  Service: Cardiology;  Laterality: N/A;     Family History   Problem Relation Age of Onset   • Hypertension Mother    • Heart disease Father    • No Known Problems Maternal Grandmother    • No Known Problems Maternal Grandfather    • No Known Problems Paternal Grandmother    • No Known Problems Paternal Grandfather      Social History     Tobacco Use   • Smoking status: Never     Passive exposure: Yes   • Smokeless tobacco:  Never   Substance Use Topics   • Alcohol use: Not Currently     Comment: Used to 12 pack of beer daily       ROS:  Review of Symptoms:  General: no recent weight loss/gain, weakness or fatigue  Skin: no rashes, lumps, or other skin changes  HEENT: no dizziness, lightheadedness, or vision changes  Respiratory: no cough or hemoptysis  Cardiovascular: no palpitations, and tachycardia  Gastrointestinal: no black/tarry stools or diarrhea  Urinary: no change in frequency or urgency  Peripheral Vascular: no claudication or leg cramps  Musculoskeletal: no muscle or joint pain/stiffness  Psychiatric: no depression or excessive stress  Neurological: no sensory or motor loss, no syncope  Hematologic: no anemia, easy bruising or bleeding  Endocrine: no thyroid problems, nor heat or cold intolerance         PHYSICAL EXAM:   /62 (BP Location: Left arm, Patient Position: Sitting)   Pulse 87   Ht 167.6 cm (66\")   Wt 91.2 kg (201 lb)   SpO2 98%   BMI 32.44 kg/m²      Wt Readings from Last 5 Encounters:   01/03/23 91.2 kg (201 lb)   12/07/22 92.1 kg (203 lb)   11/21/22 89.4 kg (197 lb)   10/21/22 89.4 kg (197 lb)   08/30/22 90.5 kg (199 lb 8 oz)     BP Readings from Last 5 Encounters:   01/03/23 112/62   12/07/22 110/68   10/21/22 122/57   08/30/22 98/58   08/29/22 126/78       General-Well Nourished, Well developed  Eyes - PERRLA  Neck- supple, No mass  CV- regular rate and rhythm, no MRG  Lung- clear bilaterally  Abd- soft, +BS  Musc/skel - Norm strength and range of motion  Skin- warm and dry  Neuro - Alert & Oriented x 3, appropriate mood.          Patient's external notes were reviewed.  Independent interpretation of test performed by another physician in facility were reviewed.  Outside laboratory data was also reviewed.    Medical problems and test results were reviewed with the patient today.         EKG:  (EKG/Tracing has been independently visualized by me and summarized below)      ECG 12 Lead    Date/Time:  1/3/2023 11:54 AM  Performed by: Reinaldo William MD  Authorized by: Reinaldo William MD   Comparison: compared with previous ECG from 8/3/2022  Similar to previous ECG  Comments: Sinus rhythm, left atrial enlargement, nonspecific ST changes            ASSESSMENT   1. NICM: Initial diagnosis on echocardiogram August 19, 2022.  EF 20%.  Guideline medical directed therapy as blood pressure allowed including Toprol, Entresto.  Patient had a normal left heart catheterization on August 22, 2022.  Follow-up echocardiogram November 21, 2022 reveals EF improved to 30% with MR improving from severe to moderate.  2. NYHA class III SHF symptoms  3. Moderate MR      PLAN  · We discussed today about the pathophysiology of sudden cardiac death and its relation to heart failure.  We discussed the option of proceeding with a defibrillator.  We also discussed the differences between a transvenous defibrillator subcutaneous defibrillator.  · At this point her ejection fraction has increased from around 20% to 30% with low doses of medical therapy.  I discussed with him that I am hopeful that if we can further optimize her medical therapy that her ejection fraction may improve to above the threshold for defibrillator implantation.  I will go ahead and increase her Entresto to 24-26 mg daily and she is going to record her blood pressures at home and let us know how they are.  If they remain reasonable we may be even able to further uptitrate it.  We will plan on echocardiogram in about 3 months to reassess her ejection fraction.        Shared Decision Making discussion with the patient:     I have discussed and allowed the pt to ask questions regarding ICDs.  Pt was provided with a Shared Decision ICD booklet (https://patientdecisionaid.org/wp-content/uploads/2017/01/ICD-Infographic-[email or web address].pdf.)The patient had the opportunity to express their preferences, values, and beliefs.        Cardiology/Electrophysiology  01/03/23  11:31 EST  Will  Vicky LEDBETTER

## 2023-02-02 DIAGNOSIS — I50.21 ACUTE SYSTOLIC HEART FAILURE: ICD-10-CM

## 2023-02-02 DIAGNOSIS — I50.40 HEART FAILURE WITH REDUCED EJECTION FRACTION AND DIASTOLIC DYSFUNCTION: Primary | ICD-10-CM

## 2023-02-06 ENCOUNTER — OFFICE VISIT (OUTPATIENT)
Dept: CARDIOLOGY | Facility: HOSPITAL | Age: 38
End: 2023-02-06
Payer: COMMERCIAL

## 2023-02-06 VITALS
HEIGHT: 66 IN | HEART RATE: 101 BPM | BODY MASS INDEX: 32.27 KG/M2 | RESPIRATION RATE: 20 BRPM | SYSTOLIC BLOOD PRESSURE: 111 MMHG | DIASTOLIC BLOOD PRESSURE: 69 MMHG | OXYGEN SATURATION: 98 % | TEMPERATURE: 97.8 F | WEIGHT: 200.8 LBS

## 2023-02-06 DIAGNOSIS — I34.0 NONRHEUMATIC MITRAL VALVE REGURGITATION: ICD-10-CM

## 2023-02-06 DIAGNOSIS — I50.40 HEART FAILURE WITH REDUCED EJECTION FRACTION AND DIASTOLIC DYSFUNCTION: Primary | ICD-10-CM

## 2023-02-06 DIAGNOSIS — J44.9 CHRONIC OBSTRUCTIVE PULMONARY DISEASE, UNSPECIFIED COPD TYPE: ICD-10-CM

## 2023-02-06 LAB
ANION GAP SERPL CALCULATED.3IONS-SCNC: 9 MMOL/L (ref 5–15)
BUN SERPL-MCNC: 12 MG/DL (ref 6–20)
BUN/CREAT SERPL: 14 (ref 7–25)
CALCIUM SPEC-SCNC: 9.3 MG/DL (ref 8.6–10.5)
CHLORIDE SERPL-SCNC: 101 MMOL/L (ref 98–107)
CO2 SERPL-SCNC: 28 MMOL/L (ref 22–29)
CREAT SERPL-MCNC: 0.86 MG/DL (ref 0.57–1)
EGFRCR SERPLBLD CKD-EPI 2021: 88.8 ML/MIN/1.73
GLUCOSE SERPL-MCNC: 113 MG/DL (ref 65–99)
MAGNESIUM SERPL-MCNC: 2.4 MG/DL (ref 1.6–2.6)
POTASSIUM SERPL-SCNC: 4.1 MMOL/L (ref 3.5–5.2)
SODIUM SERPL-SCNC: 138 MMOL/L (ref 136–145)

## 2023-02-06 PROCEDURE — 83735 ASSAY OF MAGNESIUM: CPT | Performed by: NURSE PRACTITIONER

## 2023-02-06 PROCEDURE — 80048 BASIC METABOLIC PNL TOTAL CA: CPT | Performed by: NURSE PRACTITIONER

## 2023-02-06 PROCEDURE — 99214 OFFICE O/P EST MOD 30 MIN: CPT | Performed by: NURSE PRACTITIONER

## 2023-02-06 RX ORDER — HYDROXYZINE HYDROCHLORIDE 25 MG/1
25 TABLET, FILM COATED ORAL 3 TIMES DAILY PRN
COMMUNITY
Start: 2023-01-29

## 2023-02-06 NOTE — PROGRESS NOTES
"Chief Complaint  Congestive Heart Failure (/) and Follow-up    Subjective    History of Present Illness {CC  Problem List  Visit  Diagnosis   Encounters  Notes  Medications  Labs  Result Review Imaging  Media :23}       History of Present Illness   37 year old female presents to the office today for ongoing evaluation of chronic systolic heart failure. Patient notes that she is feeling better and is having less dyspnea with increased doses of toprol, entresto. Patient is still wearing her life vest and denies any inappropriate shocks or abnormal alarms. Notes that she has been more active lately. Notes compliance with her medications.   Objective     Vital Signs:   Vitals:    02/06/23 1039   BP: 111/69   BP Location: Left arm   Patient Position: Sitting   Cuff Size: Adult   Pulse: 101   Resp: 20   Temp: 97.8 °F (36.6 °C)   TempSrc: Temporal   SpO2: 98%   Weight: 91.1 kg (200 lb 12.8 oz)   Height: 167.6 cm (66\")     Body mass index is 32.41 kg/m².  Physical Exam  Vitals and nursing note reviewed.   Constitutional:       Appearance: Normal appearance.   HENT:      Head: Normocephalic.   Eyes:      Pupils: Pupils are equal, round, and reactive to light.   Cardiovascular:      Rate and Rhythm: Normal rate and regular rhythm.      Pulses: Normal pulses.      Heart sounds: Normal heart sounds. No murmur heard.     Comments: Life vest in place   Pulmonary:      Effort: Pulmonary effort is normal.      Breath sounds: Normal breath sounds.   Abdominal:      General: Bowel sounds are normal.      Palpations: Abdomen is soft.   Musculoskeletal:         General: Normal range of motion.      Cervical back: Normal range of motion.      Right lower leg: No edema.      Left lower leg: No edema.   Skin:     General: Skin is warm and dry.      Capillary Refill: Capillary refill takes less than 2 seconds.   Neurological:      Mental Status: She is alert and oriented to person, place, and time.   Psychiatric:         Mood and " Affect: Mood normal.         Thought Content: Thought content normal.              Result Review  Data Reviewed:{ Labs  Result Review  Imaging  Med Tab  Media :23}     Adult Transthoracic Echo Complete W/ Cont if Necessary Per Protocol (11/21/2022 09:19)             Assessment and Plan {CC Problem List  Visit Diagnosis  ROS  Review (Popup)  Health Maintenance  Quality  BestPractice  Medications  SmartSets  SnapShot Encounters  Media :23}   1. Heart failure with reduced ejection fraction and diastolic dysfunction (HCC)  Continue Bumex, digoxin, Toprol, Entresto  Continue LifeVest with upcoming echo to determine if EF has improved enough to discontinue LifeVest  - Basic Metabolic Panel; Future  - Magnesium; Future  - Basic Metabolic Panel  - Magnesium    2. Nonrheumatic mitral valve regurgitation  Upcoming echo    3. Chronic obstructive pulmonary disease, unspecified COPD type (HCC)  Currently without exacerbation          Follow Up {Instructions Charge Capture  Follow-up Communications :23}   Return in about 2 months (around 4/6/2023) for Office visit, HF.    Patient was given instructions and counseling regarding her condition or for health maintenance advice. Please see specific information pulled into the AVS if appropriate.  Patient was instructed to call the [facility] with any questions, concerns, or worsening symptoms.

## 2023-02-06 NOTE — PROGRESS NOTES
Your labs look good and are within normal limits. Please schedule a follow up with your primary care provider for evaluation of the numbness in your arms

## 2023-02-13 ENCOUNTER — OFFICE VISIT (OUTPATIENT)
Dept: FAMILY MEDICINE CLINIC | Facility: CLINIC | Age: 38
End: 2023-02-13
Payer: COMMERCIAL

## 2023-02-13 VITALS
HEART RATE: 99 BPM | BODY MASS INDEX: 32.56 KG/M2 | SYSTOLIC BLOOD PRESSURE: 122 MMHG | TEMPERATURE: 98.6 F | OXYGEN SATURATION: 98 % | DIASTOLIC BLOOD PRESSURE: 70 MMHG | HEIGHT: 66 IN | WEIGHT: 202.6 LBS

## 2023-02-13 DIAGNOSIS — J30.9 ALLERGIC RHINITIS, UNSPECIFIED SEASONALITY, UNSPECIFIED TRIGGER: ICD-10-CM

## 2023-02-13 DIAGNOSIS — R20.0 NUMBNESS AND TINGLING OF RIGHT ARM: Primary | ICD-10-CM

## 2023-02-13 DIAGNOSIS — Z23 IMMUNIZATION DUE: ICD-10-CM

## 2023-02-13 DIAGNOSIS — R20.2 NUMBNESS AND TINGLING OF RIGHT ARM: Primary | ICD-10-CM

## 2023-02-13 PROCEDURE — 99214 OFFICE O/P EST MOD 30 MIN: CPT | Performed by: PHYSICIAN ASSISTANT

## 2023-02-13 PROCEDURE — 90677 PCV20 VACCINE IM: CPT | Performed by: PHYSICIAN ASSISTANT

## 2023-02-13 PROCEDURE — 90715 TDAP VACCINE 7 YRS/> IM: CPT | Performed by: PHYSICIAN ASSISTANT

## 2023-02-13 PROCEDURE — 90471 IMMUNIZATION ADMIN: CPT | Performed by: PHYSICIAN ASSISTANT

## 2023-02-13 PROCEDURE — 90472 IMMUNIZATION ADMIN EACH ADD: CPT | Performed by: PHYSICIAN ASSISTANT

## 2023-02-13 RX ORDER — LEVOCETIRIZINE DIHYDROCHLORIDE 5 MG/1
5 TABLET, FILM COATED ORAL EVERY EVENING
Qty: 30 TABLET | Refills: 5 | Status: SHIPPED | OUTPATIENT
Start: 2023-02-13

## 2023-02-13 NOTE — PROGRESS NOTES
Follow Up Office Visit      Date: 2023   Patient Name: Herminia Hernandez  : 1985   MRN: [id number]     Chief Complaint:    Chief Complaint   Patient presents with   • Foot Swelling     Both swell and turn purple when pt stands to much   • Numbness     Right arm goes numb for no reason       History of Present Illness: Herminia Hernandez is a 37 y.o. female who is here today to follow up with feet swelling and turning purple when she stands up too much.  She denies any pain.  She also states that she has had numbness in the right arm that is intermittent this comes and goes.  No neck pain or shoulder pain.    She does have significant heart failure and COPD as well.  She is having some problems with allergy symptoms.      Subjective      Review of systems:  Review of Systems   Constitutional: Negative for fatigue and fever.   HENT: Negative for trouble swallowing.    Eyes: Negative for visual disturbance.   Respiratory: Negative for shortness of breath.    Cardiovascular: Negative for chest pain and leg swelling.   Neurological: Positive for numbness.        I have reviewed and the following portions of the patient's history were updated as appropriate: past family history, past medical history, past social history, past surgical history and problem list.    Medications:     Current Outpatient Medications:   •  albuterol sulfate  (90 Base) MCG/ACT inhaler, Inhale 2 puffs 4 (Four) Times a Day., Disp: 18 g, Rfl: 0  •  bumetanide (BUMEX) 1 MG tablet, Take 1 tablet by mouth Daily., Disp: 90 tablet, Rfl: 3  •  digoxin (LANOXIN) 125 MCG tablet, Take 1 tablet by mouth Daily., Disp: 90 tablet, Rfl: 3  •  hydrOXYzine (ATARAX) 25 MG tablet, Take 25 mg by mouth 3 (Three) Times a Day As Needed., Disp: , Rfl:   •  metoprolol succinate XL (TOPROL-XL) 25 MG 24 hr tablet, Take 1 tablet by mouth Daily., Disp: 90 tablet, Rfl: 3  •  omeprazole (priLOSEC) 20 MG capsule, Take 1 capsule by mouth Daily., Disp: 30 capsule,  "Rfl: 5  •  sacubitril-valsartan (ENTRESTO) 24-26 MG tablet, Take 1 tablet by mouth Every 12 (Twelve) Hours., Disp: 180 tablet, Rfl: 3  •  levocetirizine (XYZAL) 5 MG tablet, Take 1 tablet by mouth Every Evening., Disp: 30 tablet, Rfl: 5    Allergies:   Allergies   Allergen Reactions   • Amoxicillin Swelling       Objective     Vital Signs:   Vitals:    02/13/23 1031   BP: 122/70   Pulse: 99   Temp: 98.6 °F (37 °C)   TempSrc: Infrared   SpO2: 98%   Weight: 91.9 kg (202 lb 9.6 oz)   Height: 167.6 cm (66\")   PainSc: 0-No pain     Body mass index is 32.7 kg/m².   BMI is >= 30 and <35. (Class 1 Obesity). The following options were offered after discussion;: weight loss educational material (shared in after visit summary)      Physical Exam:   Physical Exam  Vitals and nursing note reviewed.   Constitutional:       Appearance: Normal appearance.   HENT:      Head: Normocephalic and atraumatic.      Nose: Rhinorrhea present. No congestion.      Mouth/Throat:      Mouth: Mucous membranes are moist.      Pharynx: Oropharynx is clear. No posterior oropharyngeal erythema.   Cardiovascular:      Rate and Rhythm: Normal rate and regular rhythm.      Pulses:           Radial pulses are 2+ on the right side and 2+ on the left side.        Dorsalis pedis pulses are 2+ on the right side and 2+ on the left side.      Comments: LifeVest in place.  Pulmonary:      Effort: Pulmonary effort is normal.      Breath sounds: Normal breath sounds. No decreased breath sounds, wheezing, rhonchi or rales.   Musculoskeletal:      Right shoulder: No swelling or tenderness. Normal range of motion.      Cervical back: Neck supple.      Right lower leg: No edema.      Left lower leg: No edema.   Lymphadenopathy:      Cervical: No cervical adenopathy.   Skin:     Capillary Refill: Capillary refill takes less than 2 seconds.      Comments: When standing lower extremities do obtain a bluish purplish cast.   Neurological:      Mental Status: She is " alert.          Assessment / Plan      Assessment/Plan:   Diagnoses and all orders for this visit:    1. Numbness and tingling of right arm (Primary)  -     EMG & Nerve Conduction Test; Future    2. Immunization due  -     Pneumococcal Conjugate Vaccine 20-Valent All  -     Tdap Vaccine Greater Than or Equal To 8yo IM    3. Allergic rhinitis, unspecified seasonality, unspecified trigger  -     levocetirizine (XYZAL) 5 MG tablet; Take 1 tablet by mouth Every Evening.  Dispense: 30 tablet; Refill: 5    Will get EMG nerve conduction study of the right arm to assess for nerve disease.  Update pneumonia and Tdap vaccines today.  For allergic rhinitis we will try Xyzal.  In regards to the purplish cast to her feet when she stands she does have significant heart failure poor venous return.  I think this is just simply blood pooling that is causing the discoloration.  She will monitor.    Follow Up:   No follow-ups on file.    [place] PA-C   [facility]

## 2023-02-23 ENCOUNTER — HOSPITAL ENCOUNTER (OUTPATIENT)
Dept: CARDIOLOGY | Facility: HOSPITAL | Age: 38
Discharge: HOME | End: 2023-02-23
Admitting: STUDENT IN AN ORGANIZED HEALTH CARE EDUCATION/TRAINING PROGRAM
Payer: COMMERCIAL

## 2023-02-23 VITALS
HEIGHT: 66 IN | WEIGHT: 202 LBS | SYSTOLIC BLOOD PRESSURE: 104 MMHG | DIASTOLIC BLOOD PRESSURE: 70 MMHG | BODY MASS INDEX: 32.47 KG/M2

## 2023-02-23 DIAGNOSIS — I50.40 HEART FAILURE WITH REDUCED EJECTION FRACTION AND DIASTOLIC DYSFUNCTION: ICD-10-CM

## 2023-02-23 LAB
ASCENDING AORTA: 2.5 CM
BH CV ECHO MEAS - AO MAX PG: 8.5 MMHG
BH CV ECHO MEAS - AO MEAN PG: 4.8 MMHG
BH CV ECHO MEAS - AO ROOT AREA (BSA CORRECTED): 1.3 CM2
BH CV ECHO MEAS - AO ROOT DIAM: 2.7 CM
BH CV ECHO MEAS - AO V2 MAX: 145.9 CM/SEC
BH CV ECHO MEAS - AO V2 VTI: 23.4 CM
BH CV ECHO MEAS - AVA(I,D): 2.15 CM2
BH CV ECHO MEAS - EDV(CUBED): 194.7 ML
BH CV ECHO MEAS - EDV(MOD-SP2): 105 ML
BH CV ECHO MEAS - EDV(MOD-SP4): 148 ML
BH CV ECHO MEAS - EF(MOD-BP): 31 %
BH CV ECHO MEAS - EF(MOD-SP2): 28.6 %
BH CV ECHO MEAS - EF(MOD-SP4): 30.4 %
BH CV ECHO MEAS - ESV(CUBED): 135.1 ML
BH CV ECHO MEAS - ESV(MOD-SP2): 75 ML
BH CV ECHO MEAS - ESV(MOD-SP4): 103 ML
BH CV ECHO MEAS - FS: 11.5 %
BH CV ECHO MEAS - IVS/LVPW: 1 CM
BH CV ECHO MEAS - IVSD: 0.64 CM
BH CV ECHO MEAS - LA DIMENSION: 3.9 CM
BH CV ECHO MEAS - LV MASS(C)D: 134.1 GRAMS
BH CV ECHO MEAS - LV MAX PG: 2.7 MMHG
BH CV ECHO MEAS - LV MEAN PG: 1.53 MMHG
BH CV ECHO MEAS - LV V1 MAX: 81.9 CM/SEC
BH CV ECHO MEAS - LV V1 VTI: 15.9 CM
BH CV ECHO MEAS - LVIDD: 5.5 CM
BH CV ECHO MEAS - LVIDS: 5.1 CM
BH CV ECHO MEAS - LVOT AREA: 3.2 CM2
BH CV ECHO MEAS - LVOT DIAM: 2.01 CM
BH CV ECHO MEAS - LVPWD: 0.64 CM
BH CV ECHO MEAS - MV A MAX VEL: 89.8 CM/SEC
BH CV ECHO MEAS - MV DEC SLOPE: 436.5 CM/SEC2
BH CV ECHO MEAS - MV DEC TIME: 0.19 MSEC
BH CV ECHO MEAS - MV E MAX VEL: 75.8 CM/SEC
BH CV ECHO MEAS - MV E/A: 0.84
BH CV ECHO MEAS - MV MAX PG: 72.9 MMHG
BH CV ECHO MEAS - MV MEAN PG: 47.1 MMHG
BH CV ECHO MEAS - MV P1/2T: 67 MSEC
BH CV ECHO MEAS - MV V2 VTI: 130.8 CM
BH CV ECHO MEAS - MVA(P1/2T): 3.3 CM2
BH CV ECHO MEAS - MVA(VTI): 0.39 CM2
BH CV ECHO MEAS - PA ACC SLOPE: 697.2 CM/SEC2
BH CV ECHO MEAS - PA ACC TIME: 0.12 SEC
BH CV ECHO MEAS - PA PR(ACCEL): 27.1 MMHG
BH CV ECHO MEAS - SV(LVOT): 50.4 ML
BH CV ECHO MEAS - SV(MOD-SP2): 30 ML
BH CV ECHO MEAS - SV(MOD-SP4): 45 ML
BH CV ECHO MEAS - TAPSE (>1.6): 2.9 CM
BH CV XLRA - RV BASE: 2.8 CM
BH CV XLRA - RV LENGTH: 6.2 CM
BH CV XLRA - RV MID: 2 CM
BH CV XLRA - TDI S': 12.6 CM/SEC
LEFT ATRIUM VOLUME INDEX: 26.9 ML/M2
LV EF 2D ECHO EST: 35 %
MAXIMAL PREDICTED HEART RATE: 182 BPM
STRESS TARGET HR: 155 BPM

## 2023-02-23 PROCEDURE — 25010000002 SULFUR HEXAFLUORIDE MICROSPH 60.7-25 MG RECONSTITUTED SUSPENSION: Performed by: STUDENT IN AN ORGANIZED HEALTH CARE EDUCATION/TRAINING PROGRAM

## 2023-02-23 PROCEDURE — 93306 TTE W/DOPPLER COMPLETE: CPT

## 2023-02-23 PROCEDURE — 93306 TTE W/DOPPLER COMPLETE: CPT | Performed by: INTERNAL MEDICINE

## 2023-02-23 RX ADMIN — SULFUR HEXAFLUORIDE 3 ML: KIT at 10:24

## 2023-03-13 ENCOUNTER — OFFICE VISIT (OUTPATIENT)
Dept: OBSTETRICS AND GYNECOLOGY | Facility: CLINIC | Age: 38
End: 2023-03-13
Payer: COMMERCIAL

## 2023-03-13 VITALS
WEIGHT: 206 LBS | HEIGHT: 66 IN | BODY MASS INDEX: 33.11 KG/M2 | DIASTOLIC BLOOD PRESSURE: 78 MMHG | SYSTOLIC BLOOD PRESSURE: 110 MMHG

## 2023-03-13 DIAGNOSIS — Z01.419 WOMEN'S ANNUAL ROUTINE GYNECOLOGICAL EXAMINATION: Primary | ICD-10-CM

## 2023-03-13 DIAGNOSIS — N39.3 SUI (STRESS URINARY INCONTINENCE, FEMALE): ICD-10-CM

## 2023-03-13 PROCEDURE — 2014F MENTAL STATUS ASSESS: CPT | Performed by: OBSTETRICS & GYNECOLOGY

## 2023-03-13 PROCEDURE — 3008F BODY MASS INDEX DOCD: CPT | Performed by: OBSTETRICS & GYNECOLOGY

## 2023-03-13 PROCEDURE — 99385 PREV VISIT NEW AGE 18-39: CPT | Performed by: OBSTETRICS & GYNECOLOGY

## 2023-03-13 NOTE — PROGRESS NOTES
Subjective     Chief Complaint   Patient presents with   • Consult     Patient wants hysterectomy prolapsed uterus uterus coming out       April David is a 37 y.o. year old  presenting to be seen for her annual exam.      Her LMP was Patient's last menstrual period was 2023 (approximate)..  Her cycles are regular, predictable and consistent every 28 - 32 days.  Usually her flow is typically light with no more than 0 days of heavy flow each menses.   Additionally she describes no other symptoms associated with her menses.    She is sexually active.  In the past 12 months there have not been new sexual partners.  Condoms are not typically used.  She would not like to be screened for STD's at today's exam.     Current contraceptive methods being used: tubal ligation.  In the coming year, she is not considering changing her current contraceptive method.    She exercises regularly: yes.  She wears her seat belt: yes.  She has concerns about domestic violence: no.   Health Maintenance, Female  Adopting a healthy lifestyle and getting preventive care are important in promoting health and wellness. Ask your health care provider about:  • The right schedule for you to have regular tests and exams.  • Things you can do on your own to prevent diseases and keep yourself healthy.  What should I know about diet, weight, and exercise?  Eat a healthy diet  A plate with healthy, colorful foods.      • Eat a diet that includes plenty of vegetables, fruits, low-fat dairy products, and lean protein.  • Do not eat a lot of foods that are high in solid fats, added sugars, or sodium.  Maintain a healthy weight  Body mass index (BMI) is used to identify weight problems. It estimates body fat based on height and weight. Your health care provider can help determine your BMI and help you achieve or maintain a healthy weight.  Get regular exercise  Get regular exercise. This is one of the most important things you can do for your  health. Most adults should:  • Exercise for at least 150 minutes each week. The exercise should increase your heart rate and make you sweat (moderate-intensity exercise).  • Do strengthening exercises at least twice a week. This is in addition to the moderate-intensity exercise.  • Spend less time sitting. Even light physical activity can be beneficial.  Watch cholesterol and blood lipids  Have your blood tested for lipids and cholesterol at 20 years of age, then have this test every 5 years.  Have your cholesterol levels checked more often if:  • Your lipid or cholesterol levels are high.  • You are older than 40 years of age.  • You are at high risk for heart disease.  What should I know about cancer screening?  Depending on your health history and family history, you may need to have cancer screening at various ages. This may include screening for:  • Breast cancer.  • Cervical cancer.  • Colorectal cancer.  • Skin cancer.  • Lung cancer.  What should I know about heart disease, diabetes, and high blood pressure?  Blood pressure and heart disease  • High blood pressure causes heart disease and increases the risk of stroke. This is more likely to develop in people who have high blood pressure readings or are overweight.  • Have your blood pressure checked:  ? Every 3-5 years if you are 18-39 years of age.  ? Every year if you are 40 years old or older.  Diabetes  Have regular diabetes screenings. This checks your fasting blood sugar level. Have the screening done:  • Once every three years after age 40 if you are at a normal weight and have a low risk for diabetes.  • More often and at a younger age if you are overweight or have a high risk for diabetes.  What should I know about preventing infection?  Hepatitis B  If you have a higher risk for hepatitis B, you should be screened for this virus. Talk with your health care provider to find out if you are at risk for hepatitis B infection.  Hepatitis C  Testing is  recommended for:  • Everyone born from 1945 through 1965.  • Anyone with known risk factors for hepatitis C.  Sexually transmitted infections (STIs)  • Get screened for STIs, including gonorrhea and chlamydia, if:  ? You are sexually active and are younger than 24 years of age.  ? You are older than 24 years of age and your health care provider tells you that you are at risk for this type of infection.  ? Your sexual activity has changed since you were last screened, and you are at increased risk for chlamydia or gonorrhea. Ask your health care provider if you are at risk.  • Ask your health care provider about whether you are at high risk for HIV. Your health care provider may recommend a prescription medicine to help prevent HIV infection. If you choose to take medicine to prevent HIV, you should first get tested for HIV. You should then be tested every 3 months for as long as you are taking the medicine.  Pregnancy  • If you are about to stop having your period (premenopausal) and you may become pregnant, seek counseling before you get pregnant.  • Take 400 to 800 micrograms (mcg) of folic acid every day if you become pregnant.  • Ask for birth control (contraception) if you want to prevent pregnancy.  Osteoporosis and menopause  Osteoporosis is a disease in which the bones lose minerals and strength with aging. This can result in bone fractures. If you are 65 years old or older, or if you are at risk for osteoporosis and fractures, ask your health care provider if you should:  • Be screened for bone loss.  • Take a calcium or vitamin D supplement to lower your risk of fractures.  • Be given hormone replacement therapy (HRT) to treat symptoms of menopause.  Follow these instructions at home:  Alcohol use  • Do not drink alcohol if:  ? Your health care provider tells you not to drink.  ? You are pregnant, may be pregnant, or are planning to become pregnant.  • If you drink alcohol:  ? Limit how much you have  "to:  - 0-1 drink a day.  ? Know how much alcohol is in your drink. In the [place] one drink equals one 12 oz bottle of beer (355 mL), one 5 oz glass of wine (148 mL), or one 1½ oz glass of hard liquor (44 mL).  Lifestyle  • Do not use any products that contain nicotine or tobacco. These products include cigarettes, chewing tobacco, and vaping devices, such as e-cigarettes. If you need help quitting, ask your health care provider.  • Do not use street drugs.  • Do not share needles.  • Ask your health care provider for help if you need support or information about quitting drugs.  General instructions  • Schedule regular health, dental, and eye exams.  • Stay current with your vaccines.  • Tell your health care provider if:  ? You often feel depressed.  ? You have ever been abused or do not feel safe at home.  Summary  • Adopting a healthy lifestyle and getting preventive care are important in promoting health and wellness.  • Follow your health care provider's instructions about healthy diet, exercising, and getting tested or screened for diseases.  Follow your health care provider's instructions on monitoring your cholesterol and blood pressure.  GYN screening history:  · Last pap: she reports her last PAP was normal.    Additional Complaints:  EMELY   Duration: years  Daily protection needed  Progressive  No urgency  No intervention thus far    The following portions of the patient's history were reviewed and updated as appropriate:vital signs, allergies, current medications, past medical history, past social history, past surgical history and problem list.    Review of Systems  Pertinent items are noted in HPI.     Physical Exam    Objective     /78   Ht 167.6 cm (65.98\")   Wt 93.4 kg (206 lb)   LMP 03/06/2023 (Approximate)   Breastfeeding No   BMI 33.27 kg/m²       General:  well developed; well nourished  no acute distress   Constitutional: healthy   Skin:  No suspicious lesions seen   Thyroid: normal to " inspection and palpation   Lungs:  breathing is unlabored  clear to auscultation bilaterally   Heart:  regular rate and rhythm, S1, S2 normal, no murmur, click, rub or gallop   Breasts:  Examined in supine position  Symmetric without masses or skin dimpling  Nipples normal without inversion, lesions or discharge  There are no palpable axillary nodes   Abdomen: soft, non-tender; no masses  no umbilical or inginual hernias are present  no hepato-splenomegaly   Pelvis: Clinical staff was present for exam  External genitalia:  normal appearance of the external genitalia including [place]th[place]'s and [place]'s glands.  :  urethra hypermobile;  Vaginal:  normal pink mucosa without prolapse or lesions.  Cervix:  normal appearance friable; pap performed  Uterus:  normal size, shape and consistency  Adnexa:  normal bimanual exam of the adnexa.  Rectal:  recto-vaginal exam unremarkable and confirms findings; small midline rectocele   Musculoskeletal: negative   Neuro: normal without focal findings, mental status, speech normal, alert and oriented x3 and LARS   Psych: oriented to time, place and person, mood and affect are within normal limits, pt is a good historian; no memory problems were noted       Lab Review   No data reviewed    Imaging  No data reviewed    Assessment & Plan     ASSESSMENT  1. Women's annual routine gynecological examination    2. EMELY (stress urinary incontinence, female)   Will need gyn-uro at tertiary center with hx of heart Failure       PLAN  Orders Placed This Encounter   Procedures   • Ambulatory Referral to Gynecologic Urology     Referral Priority:   Routine     Referral Type:   Consultation     Referral Reason:   Specialty Services Required     Referred to Provider:   Orestes Ballesteros MD     Requested Specialty:   Urogynecology     Number of Visits Requested:   1     No orders of the defined types were placed in this encounter.        Follow up: 1 year(s)         This note was  electronically signed.    Cj Wallace MD  March 13, 2023

## 2023-03-14 ENCOUNTER — PATIENT ROUNDING (BHMG ONLY) (OUTPATIENT)
Dept: OBSTETRICS AND GYNECOLOGY | Facility: CLINIC | Age: 38
End: 2023-03-14
Payer: COMMERCIAL

## 2023-03-14 NOTE — PROGRESS NOTES
A [employer] message has been sent to the patient for [place] [facility] [place]

## 2023-03-15 LAB — REF LAB TEST METHOD: NORMAL

## 2023-03-24 ENCOUNTER — OFFICE VISIT (OUTPATIENT)
Dept: CARDIOLOGY | Facility: HOSPITAL | Age: 38
End: 2023-03-24
Payer: COMMERCIAL

## 2023-03-24 VITALS
BODY MASS INDEX: 34.27 KG/M2 | DIASTOLIC BLOOD PRESSURE: 71 MMHG | OXYGEN SATURATION: 99 % | WEIGHT: 213.25 LBS | HEART RATE: 97 BPM | RESPIRATION RATE: 20 BRPM | HEIGHT: 66 IN | SYSTOLIC BLOOD PRESSURE: 115 MMHG | TEMPERATURE: 97.4 F

## 2023-03-24 DIAGNOSIS — I50.21 ACUTE SYSTOLIC HEART FAILURE: ICD-10-CM

## 2023-03-24 DIAGNOSIS — I50.40 HEART FAILURE WITH REDUCED EJECTION FRACTION AND DIASTOLIC DYSFUNCTION: Primary | ICD-10-CM

## 2023-03-24 DIAGNOSIS — Z79.899 LONG-TERM USE OF HIGH-RISK MEDICATION: ICD-10-CM

## 2023-03-24 PROCEDURE — 99214 OFFICE O/P EST MOD 30 MIN: CPT | Performed by: NURSE PRACTITIONER

## 2023-03-24 PROCEDURE — 1160F RVW MEDS BY RX/DR IN RCRD: CPT | Performed by: NURSE PRACTITIONER

## 2023-03-24 PROCEDURE — 1159F MED LIST DOCD IN RCRD: CPT | Performed by: NURSE PRACTITIONER

## 2023-03-24 NOTE — PROGRESS NOTES
"Chief Complaint  Edema and Follow-up    Subjective    History of Present Illness {CC  Problem List  Visit  Diagnosis   Encounters  Notes  Medications  Labs  Result Review Imaging  Media :23}       History of Present Illness   37-year-old female with known HFrEF presents to the office today with worsening edema and dyspnea.  She is currently still wearing her LifeVest as her recent echo February 2023 showed an EF of 35%.  Denies any inappropriate shocks or abnormal alarms.  Patient does report over the last week or so she has been experiencing worsening dyspnea and is now experiencing dyspnea with simple activities of daily living.  Does also report pedal edema as well as abdominal fullness.  Currently denies chest pain, dizziness, presyncope or syncope.  Patient reports she is checking her blood pressure at home however she did not present today with her home blood pressure log.  She notes that blood pressure per her cuff is always in the green.  Objective     Vital Signs:   Vitals:    03/24/23 0904   BP: 115/71   BP Location: Left arm   Patient Position: Sitting   Cuff Size: Adult   Pulse: 97   Resp: 20   Temp: 97.4 °F (36.3 °C)   TempSrc: Temporal   SpO2: 99%   Weight: 96.7 kg (213 lb 4 oz)   Height: 167.6 cm (65.98\")     Body mass index is 34.44 kg/m².  Physical Exam  Vitals and nursing note reviewed.   Constitutional:       Appearance: Normal appearance.   HENT:      Head: Normocephalic.   Eyes:      Pupils: Pupils are equal, round, and reactive to light.   Cardiovascular:      Rate and Rhythm: Normal rate and regular rhythm.      Pulses: Normal pulses.      Heart sounds: Normal heart sounds. No murmur heard.  Pulmonary:      Effort: Pulmonary effort is normal.      Breath sounds: Rales present.   Abdominal:      General: Bowel sounds are normal. There is distension.   Musculoskeletal:         General: Normal range of motion.      Cervical back: Normal range of motion.      Right lower leg: Edema (1+ " pitting ) present.      Left lower leg: Edema (1+ pitting ) present.   Skin:     General: Skin is warm and dry.      Capillary Refill: Capillary refill takes less than 2 seconds.   Neurological:      Mental Status: She is alert and oriented to person, place, and time.   Psychiatric:         Mood and Affect: Mood normal.         Thought Content: Thought content normal.              Result Review  Data Reviewed:{ Labs  Result Review  Imaging  Med Tab  Media :23}   Echo February 2023  Left ventricular systolic function is moderately decreased. Estimated left ventricular EF = 35%.  In some views 35 to 40%.  •  Left ventricular diastolic function was normal.  •  Moderate mitral valve regurgitation is present.  •  Trace tricuspid regurgitation.              Assessment and Plan {CC Problem List  Visit Diagnosis  ROS  Review (Popup)  Health Maintenance  Quality  BestPractice  Medications  SmartSets  SnapShot Encounters  Media :23}   1. Heart failure with reduced ejection fraction and diastolic dysfunction (HCC)  Continue Toprol, Entresto, Bumex, digoxin    2. Acute systolic heart failure (CMS/HCC)  Unable to achieve IV access in office for IV diuretics  Increase Bumex to 1 mg twice daily for the next 3 days  Continue Toprol, Entresto    3. Long-term use of high-risk medication  Unable to achieve labs today to check this level; will attempt at next office visit in 1 week          Follow Up {Instructions Charge Capture  Follow-up Communications :23}   Return in about 1 week (around 3/31/2023) for Office visit, HF.    Patient was given instructions and counseling regarding her condition or for health maintenance advice. Please see specific information pulled into the AVS if appropriate.  Patient was instructed to call the [facility] with any questions, concerns, or worsening symptoms.

## 2023-03-31 ENCOUNTER — OFFICE VISIT (OUTPATIENT)
Dept: CARDIOLOGY | Facility: HOSPITAL | Age: 38
End: 2023-03-31
Payer: COMMERCIAL

## 2023-03-31 VITALS
WEIGHT: 211.38 LBS | BODY MASS INDEX: 33.97 KG/M2 | OXYGEN SATURATION: 100 % | HEART RATE: 68 BPM | TEMPERATURE: 97.5 F | SYSTOLIC BLOOD PRESSURE: 119 MMHG | HEIGHT: 66 IN | RESPIRATION RATE: 16 BRPM | DIASTOLIC BLOOD PRESSURE: 73 MMHG

## 2023-03-31 DIAGNOSIS — J44.9 CHRONIC OBSTRUCTIVE PULMONARY DISEASE, UNSPECIFIED COPD TYPE: ICD-10-CM

## 2023-03-31 DIAGNOSIS — I50.21 ACUTE SYSTOLIC HEART FAILURE: Primary | ICD-10-CM

## 2023-03-31 LAB — NT-PROBNP SERPL-MCNC: 210 PG/ML (ref 0–450)

## 2023-03-31 PROCEDURE — 80048 BASIC METABOLIC PNL TOTAL CA: CPT | Performed by: NURSE PRACTITIONER

## 2023-03-31 PROCEDURE — 83880 ASSAY OF NATRIURETIC PEPTIDE: CPT | Performed by: NURSE PRACTITIONER

## 2023-03-31 RX ORDER — METOLAZONE 2.5 MG/1
TABLET ORAL
Qty: 3 TABLET | Refills: 0 | Status: SHIPPED | OUTPATIENT
Start: 2023-03-31

## 2023-03-31 NOTE — PROGRESS NOTES
"Chief Complaint  Follow-up, Edema, and Congestive Heart Failure    Subjective    History of Present Illness {CC  Problem List  Visit  Diagnosis   Encounters  Notes  Medications  Labs  Result Review Imaging  Media :23}       History of Present Illness   37-year-old female presents the office today for ongoing evaluation of her nonischemic cardiomyopathy.  Patient was seen 1 week ago in [facility] and IV diuretics were attempted however unsuccessful.  Bumex was doubled up to twice daily dosing for 3 days and patient reports very little improvement in symptoms.  Reports ongoing dyspnea as well as abdominal fullness and pedal edema.  Is wearing her LifeVest and denies any inappropriate shocks or abnormal alarms.  Objective     Vital Signs:   Vitals:    03/31/23 1308 03/31/23 1315   BP: 119/73    BP Location: Left arm    Patient Position: Sitting    Cuff Size: Adult    Pulse: (!) 49 68   Resp: 16    Temp: 97.5 °F (36.4 °C)    TempSrc: Temporal    SpO2: 100%    Weight: 95.9 kg (211 lb 6 oz)    Height: 167.6 cm (65.98\")      Body mass index is 34.14 kg/m².  Physical Exam  Vitals and nursing note reviewed.   Constitutional:       Appearance: Normal appearance.   HENT:      Head: Normocephalic.   Eyes:      Pupils: Pupils are equal, round, and reactive to light.   Cardiovascular:      Rate and Rhythm: Normal rate and regular rhythm.      Pulses: Normal pulses.      Heart sounds: Normal heart sounds. No murmur heard.     Comments: Life vest in place   Pulmonary:      Effort: Pulmonary effort is normal.      Breath sounds: Rales present.   Abdominal:      General: Bowel sounds are normal. There is distension.   Musculoskeletal:         General: Normal range of motion.      Cervical back: Normal range of motion.      Right lower leg: Edema (mild ) present.      Left lower leg: Edema (mild ) present.   Skin:     General: Skin is warm and dry.      Capillary Refill: Capillary refill takes less than 2 seconds.   Neurological: "      Mental Status: She is alert and oriented to person, place, and time.   Psychiatric:         Mood and Affect: Mood normal.         Thought Content: Thought content normal.              Result Review  Data Reviewed:{ Labs  Result Review  Imaging  Med Tab  Media :23}     Lab Results   Component Value Date    GLUCOSE 113 (H) 02/06/2023    CALCIUM 9.3 02/06/2023     02/06/2023    K 4.1 02/06/2023    CO2 28.0 02/06/2023     02/06/2023    BUN 12 02/06/2023    CREATININE 0.86 02/06/2023    EGFR 88.8 02/06/2023    BCR 14.0 02/06/2023    ANIONGAP 9.0 02/06/2023     Lab Results   Component Value Date    WBC 14.08 (H) 08/24/2022    HGB 14.9 08/24/2022    HCT 43.9 08/24/2022    MCV 90.3 08/24/2022     (H) 08/24/2022     Echo February 2023:  Left ventricular systolic function is moderately decreased. Estimated left ventricular EF = 35%.  In some views 35 to 40%.  •  Left ventricular diastolic function was normal.  •  Moderate mitral valve regurgitation is present.  •  Trace tricuspid regurgitation.                Assessment and Plan {CC Problem List  Visit Diagnosis  ROS  Review (Popup)  Health Maintenance  Quality  BestPractice  Medications  SmartSets  SnapShot Encounters  Media :23}   1. Acute systolic heart failure (HCC)  Attempted to perform IV diuretics in office today but was unsuccessful  add metolazone 2.5 mg x 3 days with Bumex daily  Continue Toprol, digoxin, Entresto  - proBNP; Future  - Basic Metabolic Panel; Future  - proBNP  - Basic Metabolic Panel  Continue wearing LifeVest  2. Chronic obstructive pulmonary disease, unspecified COPD type (HCC)    Without exacerbation          Follow Up {Instructions Charge Capture  Follow-up Communications :23}   No follow-ups on file.    Patient was given instructions and counseling regarding her condition or for health maintenance advice. Please see specific information pulled into the AVS if appropriate.  Patient was instructed to call  the [facility] with any questions, concerns, or worsening symptoms.

## 2023-04-01 LAB
ANION GAP SERPL CALCULATED.3IONS-SCNC: 9 MMOL/L (ref 5–15)
BUN SERPL-MCNC: 11 MG/DL (ref 6–20)
BUN/CREAT SERPL: 14.9 (ref 7–25)
CALCIUM SPEC-SCNC: 8.9 MG/DL (ref 8.6–10.5)
CHLORIDE SERPL-SCNC: 99 MMOL/L (ref 98–107)
CO2 SERPL-SCNC: 24 MMOL/L (ref 22–29)
CREAT SERPL-MCNC: 0.74 MG/DL (ref 0.57–1)
EGFRCR SERPLBLD CKD-EPI 2021: 106.4 ML/MIN/1.73
GLUCOSE SERPL-MCNC: 145 MG/DL (ref 65–99)
POTASSIUM SERPL-SCNC: 3.6 MMOL/L (ref 3.5–5.2)
SODIUM SERPL-SCNC: 132 MMOL/L (ref 136–145)

## 2023-04-04 ENCOUNTER — DOCUMENTATION (OUTPATIENT)
Dept: CARDIOLOGY | Facility: HOSPITAL | Age: 38
End: 2023-04-04
Payer: COMMERCIAL

## 2023-04-04 ENCOUNTER — TELEPHONE (OUTPATIENT)
Dept: CARDIOLOGY | Facility: CLINIC | Age: 38
End: 2023-04-04
Payer: COMMERCIAL

## 2023-04-04 NOTE — TELEPHONE ENCOUNTER
I am not exactly sure how it got scheduled early. She sees Dr. Tom. So do you want to reorder another ECHO in 3 months?

## 2023-04-04 NOTE — PROGRESS NOTES
Provider requested I check on her after her visit on 3/31/23. I have made several attempts and pt has a call block on her phone and can not be reached at this time. I will continue to reach out to pt.

## 2023-04-04 NOTE — TELEPHONE ENCOUNTER
You saw the patient in clinic on 1/3/23 and wanted her to have an ECHO done  in 3 months. She had the ECHO done on 2/23/23 instead. Since she is currently wearing a Life Vest, what is your plan?

## 2023-04-06 NOTE — TELEPHONE ENCOUNTER
Will doesn't have any appointments available until May 15. Can she just keep following with Jamia Loredo?

## 2023-04-12 ENCOUNTER — HOSPITAL ENCOUNTER (OUTPATIENT)
Dept: NEUROLOGY | Facility: HOSPITAL | Age: 38
Discharge: HOME | End: 2023-04-12
Admitting: PHYSICIAN ASSISTANT
Payer: COMMERCIAL

## 2023-04-12 DIAGNOSIS — R20.2 NUMBNESS AND TINGLING OF RIGHT ARM: ICD-10-CM

## 2023-04-12 DIAGNOSIS — R20.0 NUMBNESS AND TINGLING OF RIGHT ARM: ICD-10-CM

## 2023-04-12 PROCEDURE — 95908 NRV CNDJ TST 3-4 STUDIES: CPT

## 2023-04-12 PROCEDURE — 95886 MUSC TEST DONE W/N TEST COMP: CPT

## 2023-04-18 NOTE — PROGRESS NOTES
[facility]   OFFICE NOTE      Herminia Hernandez  1985  PCP: [place] PA-C    SUBJECTIVE:   Herminia Hernandez is a 37 y.o. female seen for a follow up visit regarding the following:     CC:SHF    HPI:   Pleasant 37-year-old female returns to the office today for routine follow-up regarding chronic congestive heart failure, nonischemic cardiomyopathy, and valvular heart disease.  She was diagnosed with nonischemic cardiomyopathy August 2022.  She has been on maximized medical therapy which has included digoxin, Entresto, and Toprol therapy.  Her last office visit was EP was with in January 2023.  During this time her Entresto was titrated and her blood pressure has been able to tolerate it.  She has had a recent echocardiogram February 2023 that revealed EF was above 35% and in some views was actually 40%.  Her MR is reduced from severe to moderate.  She has had some worsening edema and has been followed by the [facility] and has been diuresed with Zaroxolyn and higher doses of Bumex.    Cardiac PMH: (Old records have been reviewed and summarized below)  1. Acute systolic heart failure  a. Echo 8/19/2022, RDS: EF 20%, grade 2 diastolic dysfunction, severe MR, LV moderately dilated, LA moderately dilated, RVSP 45 to 55 mmHg, mildly reduced right ventricular systolic function.  b. LHC, 08/22/2022 ; normal coronary arteries. Normal L heart pressures.   c. Echo, 11/21/2022; EF 30%. Moderate to severe LV systolic dysfunction. Global hypokinesis. Moderate MR. Trace TR.   d. Echocardiogram EF 35%, some views 40%.   2. Previous methamphetamine use  3. Mitral regurgitation  4. Reported history of COPD  asthma      Past Medical History, Past Surgical History, Family history, Social History, and Medications were all reviewed with the patient today and updated as necessary.       Current Outpatient Medications:   •  albuterol sulfate  (90 Base) MCG/ACT inhaler,  "Inhale 2 puffs 4 (Four) Times a Day., Disp: 18 g, Rfl: 0  •  digoxin (LANOXIN) 125 MCG tablet, Take 1 tablet by mouth Daily., Disp: 90 tablet, Rfl: 3  •  hydrOXYzine (ATARAX) 25 MG tablet, Take 1 tablet by mouth 3 (Three) Times a Day As Needed., Disp: , Rfl:   •  metoprolol succinate XL (TOPROL-XL) 25 MG 24 hr tablet, Take 1 tablet by mouth Daily., Disp: 90 tablet, Rfl: 3  •  omeprazole (priLOSEC) 20 MG capsule, Take 1 capsule by mouth Daily., Disp: 30 capsule, Rfl: 5  •  sacubitril-valsartan (ENTRESTO) 24-26 MG tablet, Take 1 tablet by mouth Every 12 (Twelve) Hours., Disp: 180 tablet, Rfl: 3  •  bumetanide (BUMEX) 1 MG tablet, Take 1 tablet by mouth Daily., Disp: 90 tablet, Rfl: 3  •  metOLazone (ZAROXOLYN) 2.5 MG tablet, Take 30 minutes prior to bumex dose, Disp: 3 tablet, Rfl: 0      Allergies   Allergen Reactions   • Amoxicillin Swelling         PHYSICAL EXAM:    /60 (BP Location: Right arm, Patient Position: Sitting, Cuff Size: Adult)   Pulse 98   Ht 167.6 cm (66\")   Wt 95.7 kg (211 lb)   SpO2 97%   BMI 34.06 kg/m²        Wt Readings from Last 5 Encounters:   04/19/23 95.7 kg (211 lb)   03/31/23 95.9 kg (211 lb 6 oz)   03/24/23 96.7 kg (213 lb 4 oz)   03/13/23 93.4 kg (206 lb)   02/23/23 91.6 kg (202 lb)       BP Readings from Last 5 Encounters:   04/19/23 112/60   03/31/23 119/73   03/24/23 115/71   03/13/23 110/78   02/23/23 104/70       General appearance - Alert, well appearing, and in no distress   Mental status - Affect appropriate to mood.  Eyes - Sclerae anicteric,  ENMT - Hearing grossly normal bilaterally, Dental hygiene good.  Neck - Carotids upstroke normal bilaterally, no bruits, no JVD.  Resp - Clear to auscultation, no wheezes, rales or rhonchi, symmetric air entry.  Heart - Normal rate, regular rhythm, normal S1, S2, no murmurs, rubs, clicks or gallops.  GI - Soft, nontender, nondistended, no masses or organomegaly.  Neurological - Grossly intact - normal speech, no focal " findings  Musculoskeletal - No joint tenderness, deformity or swelling, no muscular tenderness noted.  Extremities - Peripheral pulses normal, no pedal edema, no clubbing or cyanosis.  Skin - Normal coloration and turgor.  Psych -  oriented to person, place, and time.    Medical problems and test results were reviewed with the patient today.     Recent Results (from the past 672 hour(s))   proBNP    Collection Time: 03/31/23  1:58 PM    Specimen: Blood   Result Value Ref Range    proBNP 210.0 0.0 - 450.0 pg/mL   Basic Metabolic Panel    Collection Time: 03/31/23  1:58 PM    Specimen: Blood   Result Value Ref Range    Glucose 145 (H) 65 - 99 mg/dL    BUN 11 6 - 20 mg/dL    Creatinine 0.74 0.57 - 1.00 mg/dL    Sodium 132 (L) 136 - 145 mmol/L    Potassium 3.6 3.5 - 5.2 mmol/L    Chloride 99 98 - 107 mmol/L    CO2 24.0 22.0 - 29.0 mmol/L    Calcium 8.9 8.6 - 10.5 mg/dL    BUN/Creatinine Ratio 14.9 7.0 - 25.0    Anion Gap 9.0 5.0 - 15.0 mmol/L    eGFR 106.4 >60.0 mL/min/1.73         EKG: (EKG has been independently visualized by me and summarized below)    ECG 12 Lead    Date/Time: 4/19/2023 1:13 PM  Performed by: Jamison Perry PA  Authorized by: Jamison Perry PA   Comparison: compared with previous ECG from 1/3/2023  Similar to previous ECG  Rhythm: sinus rhythm  Rate: normal  Conduction: conduction normal  ST Segments: ST segments normal  QRS axis: normal    Clinical impression: normal ECG              ASSESSMENT   1.  Nonischemic cardiomyopathy: Guideline directed medical therapy including Toprol Entresto digoxin and diuretics.  Most recent echocardiogram February 23, 2023 EF is 35 however some views appear to be 40%.    2.  Valvular heart disease: Mild to moderate MR by most recent echocardiogram.    3.  Chronic class III systolic heart failure recent exacerbation increased edema.  Diuretics added including Zaroxolyn and Bumex therapy.     4.  Illicit drug use: Patient tells me she still occasionally  "snorts methamphetamine.  I instructed her that this is very dangerous and she does not do so.  She is trying to \"wean off\" the drugs.    PLAN  · Discussed with the the patient prognosis and treatment plan which is includes continue to titrate medications to improve her LV function.  Would like her to try Toprol 50 mg daily.  She will continue Entresto, Lanoxin Bumex Entresto and Zaroxolyn.  She is not taking her medicines correctly are really educated her that she should take her Zaroxolyn 30 minutes prior to the Bumex to improve her diuresis.  Also instructed her she continue to focus on increasing her exercise watch her diet reduce sodium intake Daily weights.  · We would like to repeat echocardiogram in a few months to see if her LV function continues to improve if it does so we may be able to provide her with a relief from wearing a LifeVest.  Also instructed illicit drug use may prohibit her full recovery with her LV function.   · Return for follow-up in 3 months or sooner as needed.                4/19/2023  12:08 EDT    Will Vicky LEDBETTER  "

## 2023-04-19 ENCOUNTER — OFFICE VISIT (OUTPATIENT)
Dept: CARDIOLOGY | Facility: CLINIC | Age: 38
End: 2023-04-19
Payer: COMMERCIAL

## 2023-04-19 VITALS
HEART RATE: 98 BPM | DIASTOLIC BLOOD PRESSURE: 60 MMHG | WEIGHT: 211 LBS | BODY MASS INDEX: 33.91 KG/M2 | SYSTOLIC BLOOD PRESSURE: 112 MMHG | OXYGEN SATURATION: 97 % | HEIGHT: 66 IN

## 2023-04-19 DIAGNOSIS — I50.21 ACUTE SYSTOLIC HEART FAILURE: ICD-10-CM

## 2023-04-19 DIAGNOSIS — I34.0 NONRHEUMATIC MITRAL VALVE REGURGITATION: Primary | ICD-10-CM

## 2023-04-19 DIAGNOSIS — I50.40 HEART FAILURE WITH REDUCED EJECTION FRACTION AND DIASTOLIC DYSFUNCTION: ICD-10-CM

## 2023-04-19 PROCEDURE — 99214 OFFICE O/P EST MOD 30 MIN: CPT | Performed by: PHYSICIAN ASSISTANT

## 2023-04-19 PROCEDURE — 93000 ELECTROCARDIOGRAM COMPLETE: CPT | Performed by: PHYSICIAN ASSISTANT

## 2023-05-19 ENCOUNTER — TELEPHONE (OUTPATIENT)
Dept: FAMILY MEDICINE CLINIC | Facility: CLINIC | Age: 38
End: 2023-05-19

## 2023-05-19 NOTE — TELEPHONE ENCOUNTER
NO SHOW.  TRIED TO CALL TO RESCHEDULE AND INFORM OF NO SHOW POLICY, NO ANSWER.  SENT LETTER IN [place] AND VIA MAIL.

## 2023-06-06 RX ORDER — OMEPRAZOLE 20 MG/1
20 CAPSULE, DELAYED RELEASE ORAL DAILY
Qty: 30 CAPSULE | Refills: 5 | Status: SHIPPED | OUTPATIENT
Start: 2023-06-06

## 2023-07-19 ENCOUNTER — TELEPHONE (OUTPATIENT)
Dept: CARDIOLOGY | Facility: HOSPITAL | Age: 38
End: 2023-07-19

## 2023-07-19 NOTE — TELEPHONE ENCOUNTER
Caller: Herminia Hernandez    Relationship to patient: Self    Best call back number: [phone]    Chief complaint: PATIENT STATES SHE HAS FLUID BUILD UP ALL OVER BODY, FEET, LEGS, FACE    Type of visit: F/U    Requested date: ASAP    If rescheduling, when is the original appointment: 6.21.23 - NO SHOW

## 2023-07-24 ENCOUNTER — OFFICE VISIT (OUTPATIENT)
Dept: CARDIOLOGY | Facility: HOSPITAL | Age: 38
End: 2023-07-24
Payer: COMMERCIAL

## 2023-07-24 VITALS
HEIGHT: 66 IN | HEART RATE: 98 BPM | TEMPERATURE: 97.9 F | WEIGHT: 215 LBS | RESPIRATION RATE: 16 BRPM | OXYGEN SATURATION: 98 % | BODY MASS INDEX: 34.55 KG/M2 | DIASTOLIC BLOOD PRESSURE: 70 MMHG | SYSTOLIC BLOOD PRESSURE: 113 MMHG

## 2023-07-24 DIAGNOSIS — J44.9 CHRONIC OBSTRUCTIVE PULMONARY DISEASE, UNSPECIFIED COPD TYPE: ICD-10-CM

## 2023-07-24 DIAGNOSIS — I50.21 ACUTE SYSTOLIC HEART FAILURE: Primary | ICD-10-CM

## 2023-07-24 DIAGNOSIS — I34.0 NONRHEUMATIC MITRAL VALVE REGURGITATION: ICD-10-CM

## 2023-07-24 LAB
ANION GAP SERPL CALCULATED.3IONS-SCNC: 12.5 MMOL/L (ref 5–15)
BUN SERPL-MCNC: 9 MG/DL (ref 6–20)
BUN/CREAT SERPL: 11.7 (ref 7–25)
CALCIUM SPEC-SCNC: 8.9 MG/DL (ref 8.6–10.5)
CHLORIDE SERPL-SCNC: 100 MMOL/L (ref 98–107)
CO2 SERPL-SCNC: 22.5 MMOL/L (ref 22–29)
CREAT SERPL-MCNC: 0.77 MG/DL (ref 0.57–1)
EGFRCR SERPLBLD CKD-EPI 2021: 100.8 ML/MIN/1.73
GLUCOSE SERPL-MCNC: 250 MG/DL (ref 65–99)
NT-PROBNP SERPL-MCNC: 417 PG/ML (ref 0–450)
POTASSIUM SERPL-SCNC: 4.4 MMOL/L (ref 3.5–5.2)
SODIUM SERPL-SCNC: 135 MMOL/L (ref 136–145)

## 2023-07-24 PROCEDURE — 1159F MED LIST DOCD IN RCRD: CPT | Performed by: NURSE PRACTITIONER

## 2023-07-24 PROCEDURE — 83880 ASSAY OF NATRIURETIC PEPTIDE: CPT | Performed by: NURSE PRACTITIONER

## 2023-07-24 PROCEDURE — 99214 OFFICE O/P EST MOD 30 MIN: CPT | Performed by: NURSE PRACTITIONER

## 2023-07-24 PROCEDURE — 1160F RVW MEDS BY RX/DR IN RCRD: CPT | Performed by: NURSE PRACTITIONER

## 2023-07-24 PROCEDURE — 80048 BASIC METABOLIC PNL TOTAL CA: CPT | Performed by: NURSE PRACTITIONER

## 2023-07-24 RX ORDER — METOPROLOL SUCCINATE 50 MG/1
50 TABLET, EXTENDED RELEASE ORAL DAILY
Qty: 30 TABLET | Refills: 11 | Status: SHIPPED | OUTPATIENT
Start: 2023-07-24

## 2023-07-24 NOTE — PROGRESS NOTES
"Chief Complaint  Follow-up (hfref)    Subjective    History of Present Illness {CC  Problem List  Visit  Diagnosis   Encounters  Notes  Medications  Labs  Result Review Imaging  Media :23}       History of Present Illness   38-year-old female presents the office today for ongoing evaluation of her nonischemic cardiomyopathy.  Patient last seen in heart valve March 31, 2023.  Patient has no showed multiple appointments since then.  She reports that she has gained at least 4 to 5 pounds and endorses swelling all over her body.  She reports she is not currently wearing her LifeVest for unclear reasons.  She reports that she did a line of meth on Friday because it was her birthday.  Patient reports that she is trying to quit meth and has repeatedly deferred rehab options.  Patient also not taking metoprolol.  At last cardiology visit April 2023 Toprol was increased from 25 mg to 50 mg daily.  Patient reports she is not currently taking either dose.  Endorses dyspnea on exertion, abdominal fullness, pedal edema.  Vital Signs:   Vitals:    07/24/23 1420   BP: 113/70   BP Location: Left arm   Patient Position: Sitting   Pulse: 98   Resp: 16   Temp: 97.9 °F (36.6 °C)   TempSrc: Temporal   SpO2: 98%   Weight: 97.5 kg (215 lb)   Height: 167.6 cm (66\")     Body mass index is 34.7 kg/m².  Physical Exam  Vitals and nursing note reviewed.   Constitutional:       Appearance: Normal appearance.   HENT:      Head: Normocephalic.   Eyes:      Pupils: Pupils are equal, round, and reactive to light.   Cardiovascular:      Rate and Rhythm: Normal rate and regular rhythm.      Pulses: Normal pulses.      Heart sounds: Normal heart sounds. No murmur heard.     Comments: Life vest in place   Pulmonary:      Effort: Pulmonary effort is normal.      Breath sounds: Rales present.   Abdominal:      General: Bowel sounds are normal. There is distension.   Musculoskeletal:         General: Normal range of motion.      Cervical back: " Normal range of motion.      Right lower leg: Edema (mild ) present.      Left lower leg: Edema (mild ) present.   Skin:     General: Skin is warm and dry.      Capillary Refill: Capillary refill takes less than 2 seconds.   Neurological:      Mental Status: She is alert and oriented to person, place, and time.   Psychiatric:         Mood and Affect: Mood normal.         Thought Content: Thought content normal.            Result Review  Data Reviewed:{ Labs  Result Review  Imaging  Med Tab  Media :23}     Lab Results   Component Value Date    GLUCOSE 145 (H) 03/31/2023    CALCIUM 8.9 03/31/2023     (L) 03/31/2023    K 3.6 03/31/2023    CO2 24.0 03/31/2023    CL 99 03/31/2023    BUN 11 03/31/2023    CREATININE 0.74 03/31/2023    EGFR 106.4 03/31/2023    BCR 14.9 03/31/2023    ANIONGAP 9.0 03/31/2023     Lab Results   Component Value Date    WBC 14.08 (H) 08/24/2022    HGB 14.9 08/24/2022    HCT 43.9 08/24/2022    MCV 90.3 08/24/2022     (H) 08/24/2022     Echo February 2023:  Left ventricular systolic function is moderately decreased. Estimated left ventricular EF = 35%.  In some views 35 to 40%.    Left ventricular diastolic function was normal.    Moderate mitral valve regurgitation is present.    Trace tricuspid regurgitation.                Assessment and Plan {CC Problem List  Visit Diagnosis  ROS  Review (Popup)  Health Maintenance  Quality  BestPractice  Medications  SmartSets  SnapShot Encounters  Media :23}   1. Acute systolic heart failure (HCC)  Received 80 mg of Lasix through butterfly in the left AC.  Butterfly was discontinued and area was free of ecchymosis and erythema.    Continue  digoxin, Entresto  - proBNP; Future  - Basic Metabolic Panel; Future  - proBNP  - Basic Metabolic Panel  Consult patient to return wearing LifeVest on a regular basis  Begin Toprol 50 mg daily (unclear why patient is not taking Toprol at this time  2. Chronic obstructive pulmonary disease,  unspecified COPD type (HCC)    Without exacerbation    Long discussion with patient to encourage compliance with medications as well is to discontinue methamphetamine use      Follow Up {Instructions Charge Capture  Follow-up Communications :23}   Return in about 1 week (around 7/31/2023) for Office visit, HF.    Patient was given instructions and counseling regarding her condition or for health maintenance advice. Please see specific information pulled into the AVS if appropriate.  Patient was instructed to call the [facility] with any questions, concerns, or worsening symptoms.

## 2023-08-15 ENCOUNTER — OFFICE VISIT (OUTPATIENT)
Dept: CARDIOLOGY | Facility: HOSPITAL | Age: 38
End: 2023-08-15
Payer: COMMERCIAL

## 2023-08-15 VITALS
SYSTOLIC BLOOD PRESSURE: 107 MMHG | HEIGHT: 66 IN | WEIGHT: 212.38 LBS | OXYGEN SATURATION: 100 % | HEART RATE: 107 BPM | TEMPERATURE: 97.8 F | DIASTOLIC BLOOD PRESSURE: 64 MMHG | BODY MASS INDEX: 34.13 KG/M2

## 2023-08-15 DIAGNOSIS — I50.40 HEART FAILURE WITH REDUCED EJECTION FRACTION AND DIASTOLIC DYSFUNCTION: Primary | ICD-10-CM

## 2023-08-15 DIAGNOSIS — J44.9 CHRONIC OBSTRUCTIVE PULMONARY DISEASE, UNSPECIFIED COPD TYPE: ICD-10-CM

## 2023-08-15 DIAGNOSIS — I34.0 NONRHEUMATIC MITRAL VALVE REGURGITATION: ICD-10-CM

## 2023-08-15 PROCEDURE — 1159F MED LIST DOCD IN RCRD: CPT | Performed by: NURSE PRACTITIONER

## 2023-08-15 PROCEDURE — 1160F RVW MEDS BY RX/DR IN RCRD: CPT | Performed by: NURSE PRACTITIONER

## 2023-08-15 PROCEDURE — 99214 OFFICE O/P EST MOD 30 MIN: CPT | Performed by: NURSE PRACTITIONER

## 2023-08-15 RX ORDER — CEPHALEXIN 500 MG/1
500 CAPSULE ORAL 2 TIMES DAILY
Qty: 20 CAPSULE | Refills: 0 | Status: CANCELLED | OUTPATIENT
Start: 2023-08-15

## 2023-08-15 RX ORDER — LEVOCETIRIZINE DIHYDROCHLORIDE 5 MG/1
5 TABLET, FILM COATED ORAL EVERY EVENING
COMMUNITY
Start: 2023-07-19

## 2023-08-15 RX ORDER — DOXYCYCLINE HYCLATE 100 MG
100 TABLET ORAL 2 TIMES DAILY
Qty: 20 TABLET | Refills: 0 | Status: SHIPPED | OUTPATIENT
Start: 2023-08-15

## 2023-08-15 RX ORDER — BENZONATATE 100 MG/1
100 CAPSULE ORAL 3 TIMES DAILY PRN
Qty: 30 CAPSULE | Refills: 1 | Status: SHIPPED | OUTPATIENT
Start: 2023-08-15

## 2023-08-15 NOTE — PROGRESS NOTES
"Chief Complaint  Congestive Heart Failure and Follow-up    Subjective    History of Present Illness {CC  Problem List  Visit  Diagnosis   Encounters  Notes  Medications  Labs  Result Review Imaging  Media :23}       History of Present Illness   38-year-old female presents the office today for ongoing evaluation of her HFpEF and nonischemic cardiomyopathy.  Scheduled for follow-up echo next week to reevaluate EF and determination if LifeVest can be discontinued. Notes that she has been coughing up thick yellow mucus for the past few days and notes that she experiences shortness of breath when she lies down at night.  Currently denies chest pain, dizziness, presyncope or syncope or pedal edema.  Patient reports that she is voiding quite frequently throughout the day.  Objective     Vital Signs:   Vitals:    08/15/23 1446   BP: 107/64   BP Location: Left arm   Patient Position: Sitting   Cuff Size: Adult   Pulse: 107   Temp: 97.8 øF (36.6 øC)   TempSrc: Temporal   SpO2: 100%   Weight: 96.3 kg (212 lb 6 oz)   Height: 167.6 cm (66\")     Body mass index is 34.28 kg/mý.  Physical Exam  Vitals and nursing note reviewed.   Constitutional:       Appearance: Normal appearance.   HENT:      Head: Normocephalic.   Eyes:      Pupils: Pupils are equal, round, and reactive to light.   Cardiovascular:      Rate and Rhythm: Normal rate and regular rhythm.      Pulses: Normal pulses.      Heart sounds: Normal heart sounds. No murmur heard.     Comments: Life vest in place   Pulmonary:      Effort: Pulmonary effort is normal.      Breath sounds: Normal breath sounds.   Abdominal:      General: Bowel sounds are normal.      Palpations: Abdomen is soft.   Musculoskeletal:         General: Normal range of motion.      Cervical back: Normal range of motion.      Right lower leg: No edema.      Left lower leg: No edema.   Skin:     General: Skin is warm and dry.      Capillary Refill: Capillary refill takes less than 2 seconds. "   Neurological:      Mental Status: She is alert and oriented to person, place, and time.   Psychiatric:         Mood and Affect: Mood normal.         Thought Content: Thought content normal.            Result Review  Data Reviewed:{ Labs  Result Review  Imaging  Med Tab  Media :23}     Lab Results   Component Value Date    GLUCOSE 250 (H) 07/24/2023    CALCIUM 8.9 07/24/2023     (L) 07/24/2023    K 4.4 07/24/2023    CO2 22.5 07/24/2023     07/24/2023    BUN 9 07/24/2023    CREATININE 0.77 07/24/2023    EGFR 100.8 07/24/2023    BCR 11.7 07/24/2023    ANIONGAP 12.5 07/24/2023     Lab Results   Component Value Date    WBC 14.08 (H) 08/24/2022    HGB 14.9 08/24/2022    HCT 43.9 08/24/2022    MCV 90.3 08/24/2022     (H) 08/24/2022     Adult Transthoracic Echo Complete w/ Color, Spectral and Contrast if necessary per protocol (02/23/2023 10:19)            Assessment and Plan {CC Problem List  Visit Diagnosis  ROS  Review (Popup)  Health Maintenance  Quality  BestPractice  Medications  SmartSets  SnapShot Encounters  Media :23}   1. Heart failure with reduced ejection fraction and diastolic dysfunction  Currently euvolemic  Continue Bumex, digoxin, Toprol, Entresto  Upcoming echo  2. Nonrheumatic mitral valve regurgitation    Upcoming echo  3. Chronic obstructive pulmonary disease, unspecified COPD type  Tessalon Perles 3 times daily  Doxycyline 100 mg bid x 10 days        Follow Up {Instructions Charge Capture  Follow-up Communications :23}   No follow-ups on file.    Patient was given instructions and counseling regarding her condition or for health maintenance advice. Please see specific information pulled into the AVS if appropriate.  Patient was instructed to call the [facility] with any questions, concerns, or worsening symptoms.

## 2023-08-24 ENCOUNTER — HOSPITAL ENCOUNTER (OUTPATIENT)
Dept: CARDIOLOGY | Facility: HOSPITAL | Age: 38
Discharge: HOME | End: 2023-08-24
Admitting: PHYSICIAN ASSISTANT
Payer: COMMERCIAL

## 2023-08-24 VITALS — BODY MASS INDEX: 34.08 KG/M2 | WEIGHT: 212.08 LBS | HEIGHT: 66 IN

## 2023-08-24 DIAGNOSIS — I50.21 ACUTE SYSTOLIC HEART FAILURE: ICD-10-CM

## 2023-08-24 DIAGNOSIS — I50.40 HEART FAILURE WITH REDUCED EJECTION FRACTION AND DIASTOLIC DYSFUNCTION: ICD-10-CM

## 2023-08-24 DIAGNOSIS — I34.0 NONRHEUMATIC MITRAL VALVE REGURGITATION: ICD-10-CM

## 2023-08-24 PROCEDURE — 93306 TTE W/DOPPLER COMPLETE: CPT

## 2023-08-24 PROCEDURE — 25010000002 SULFUR HEXAFLUORIDE MICROSPH 60.7-25 MG RECONSTITUTED SUSPENSION: Performed by: PHYSICIAN ASSISTANT

## 2023-08-24 RX ADMIN — SULFUR HEXAFLUORIDE 2 ML: KIT at 11:50

## 2023-08-25 LAB
ASCENDING AORTA: 2.7 CM
BH CV ECHO MEAS - AO MAX PG: 7 MMHG
BH CV ECHO MEAS - AO MEAN PG: 4 MMHG
BH CV ECHO MEAS - AO ROOT DIAM: 2.5 CM
BH CV ECHO MEAS - AO V2 MAX: 132 CM/SEC
BH CV ECHO MEAS - AO V2 VTI: 17.4 CM
BH CV ECHO MEAS - AVA(I,D): 1.53 CM2
BH CV ECHO MEAS - EDV(CUBED): 117.6 ML
BH CV ECHO MEAS - EDV(MOD-SP2): 69.5 ML
BH CV ECHO MEAS - EDV(MOD-SP4): 125 ML
BH CV ECHO MEAS - EF(MOD-BP): 50 %
BH CV ECHO MEAS - EF(MOD-SP2): 51.1 %
BH CV ECHO MEAS - EF(MOD-SP4): 49 %
BH CV ECHO MEAS - ESV(CUBED): 50.7 ML
BH CV ECHO MEAS - ESV(MOD-SP2): 34 ML
BH CV ECHO MEAS - ESV(MOD-SP4): 63.8 ML
BH CV ECHO MEAS - FS: 24.5 %
BH CV ECHO MEAS - IVS/LVPW: 1 CM
BH CV ECHO MEAS - IVSD: 0.8 CM
BH CV ECHO MEAS - LA DIMENSION: 3.6 CM
BH CV ECHO MEAS - LAT PEAK E' VEL: 10.6 CM/SEC
BH CV ECHO MEAS - LV DIASTOLIC VOL/BSA (35-75): 61.1 CM2
BH CV ECHO MEAS - LV MASS(C)D: 131.2 GRAMS
BH CV ECHO MEAS - LV MAX PG: 1.84 MMHG
BH CV ECHO MEAS - LV MEAN PG: 1 MMHG
BH CV ECHO MEAS - LV SYSTOLIC VOL/BSA (12-30): 31.2 CM2
BH CV ECHO MEAS - LV V1 MAX: 67.8 CM/SEC
BH CV ECHO MEAS - LV V1 VTI: 9.4 CM
BH CV ECHO MEAS - LVIDD: 4.9 CM
BH CV ECHO MEAS - LVIDS: 3.7 CM
BH CV ECHO MEAS - LVOT AREA: 2.8 CM2
BH CV ECHO MEAS - LVOT DIAM: 1.9 CM
BH CV ECHO MEAS - LVPWD: 0.8 CM
BH CV ECHO MEAS - MED PEAK E' VEL: 7 CM/SEC
BH CV ECHO MEAS - MV A MAX VEL: 64.3 CM/SEC
BH CV ECHO MEAS - MV DEC SLOPE: 213 CM/SEC2
BH CV ECHO MEAS - MV DEC TIME: 0.19 MSEC
BH CV ECHO MEAS - MV E MAX VEL: 40.4 CM/SEC
BH CV ECHO MEAS - MV E/A: 0.63
BH CV ECHO MEAS - PA ACC TIME: 0.1 SEC
BH CV ECHO MEAS - PA V2 MAX: 136 CM/SEC
BH CV ECHO MEAS - SI(MOD-SP2): 17.4 ML/M2
BH CV ECHO MEAS - SI(MOD-SP4): 29.9 ML/M2
BH CV ECHO MEAS - SV(LVOT): 26.7 ML
BH CV ECHO MEAS - SV(MOD-SP2): 35.5 ML
BH CV ECHO MEAS - SV(MOD-SP4): 61.2 ML
BH CV ECHO MEAS - TAPSE (>1.6): 2.09 CM
BH CV ECHO MEASUREMENTS AVERAGE E/E' RATIO: 4.59
BH CV VAS BP RIGHT ARM: NORMAL MMHG
BH CV XLRA - RV BASE: 2.6 CM
BH CV XLRA - RV LENGTH: 6.6 CM
BH CV XLRA - RV MID: 2.6 CM
BH CV XLRA - TDI S': 15.8 CM/SEC
IVRT: 79 MSEC
LEFT ATRIUM VOLUME INDEX: 15.6 ML/M2
LV EF 2D ECHO EST: 45 %

## 2023-09-06 RX ORDER — LEVOCETIRIZINE DIHYDROCHLORIDE 5 MG/1
5 TABLET, FILM COATED ORAL EVERY EVENING
Qty: 30 TABLET | Refills: 0 | Status: SHIPPED | OUTPATIENT
Start: 2023-09-06 | End: 2023-09-06

## 2023-09-06 RX ORDER — LEVOCETIRIZINE DIHYDROCHLORIDE 5 MG/1
5 TABLET, FILM COATED ORAL EVERY EVENING
Qty: 90 TABLET | Refills: 0 | Status: SHIPPED | OUTPATIENT
Start: 2023-09-06

## 2023-10-30 ENCOUNTER — TELEPHONE (OUTPATIENT)
Dept: CARDIOLOGY | Facility: HOSPITAL | Age: 38
End: 2023-10-30
Payer: COMMERCIAL

## 2023-10-30 NOTE — TELEPHONE ENCOUNTER
Caller: Herminia Hernandez    Relationship: Self    Best call back number: [phone]    Requested Prescriptions:   Requested Prescriptions     Pending Prescriptions Disp Refills    sacubitril-valsartan (ENTRESTO) 24-26 MG tablet 180 tablet 3     Sig: Take 1 tablet by mouth Every 12 (Twelve) Hours.        Pharmacy where request should be sent:  [facility] # [id number]    Last office visit with prescribing clinician: 8/15/2023   Last telemedicine visit with prescribing clinician: Visit date not found   Next office visit with prescribing clinician: Visit date not found     Additional details provided by patient: PATIENT IS OUT    Does the patient have less than a 3 day supply:  [x] Yes  [] No    Would you like a call back once the refill request has been completed: [] Yes [x] No    If the office needs to give you a call back, can they leave a voicemail: [] Yes [x] No    Valorie Mendoza Rep   10/30/23 10:55 EDT

## 2023-11-20 RX ORDER — DIGOXIN 125 MCG
125 TABLET ORAL DAILY
Qty: 90 TABLET | Refills: 3 | Status: SHIPPED | OUTPATIENT
Start: 2023-11-20

## 2023-12-07 ENCOUNTER — TELEPHONE (OUTPATIENT)
Dept: OBSTETRICS AND GYNECOLOGY | Facility: CLINIC | Age: 38
End: 2023-12-07
Payer: COMMERCIAL

## 2023-12-07 NOTE — TELEPHONE ENCOUNTER
ANDREW MOLINA     [phone]    PT IS INQUIRING ABOUT A HYSTERECTOMY THAT SHOULD'VE BEEN SCHEDULED, NOTHING IN CHART.

## 2023-12-08 ENCOUNTER — PATIENT MESSAGE (OUTPATIENT)
Dept: OBSTETRICS AND GYNECOLOGY | Facility: CLINIC | Age: 38
End: 2023-12-08
Payer: COMMERCIAL

## 2023-12-08 NOTE — TELEPHONE ENCOUNTER
Contacted Dr. Ballesteros's office regarding referral, pt was schedule in April and she did not keep the appointment or reschedule it. They advised pt to contact their office at [phone] option #1 and they would get her rescheduled.

## 2023-12-11 NOTE — TELEPHONE ENCOUNTER
Called attempting to speak with patient, another party answered stated that April was not available at this time, stated they would have her give us a call back when able.

## 2023-12-20 ENCOUNTER — OFFICE VISIT (OUTPATIENT)
Dept: CARDIOLOGY | Facility: CLINIC | Age: 38
End: 2023-12-20
Payer: COMMERCIAL

## 2023-12-20 VITALS
HEIGHT: 66 IN | OXYGEN SATURATION: 97 % | DIASTOLIC BLOOD PRESSURE: 50 MMHG | HEART RATE: 88 BPM | WEIGHT: 183 LBS | BODY MASS INDEX: 29.41 KG/M2 | SYSTOLIC BLOOD PRESSURE: 88 MMHG

## 2023-12-20 DIAGNOSIS — I50.21 ACUTE SYSTOLIC HEART FAILURE: Primary | ICD-10-CM

## 2023-12-20 DIAGNOSIS — I34.0 NONRHEUMATIC MITRAL VALVE REGURGITATION: ICD-10-CM

## 2023-12-20 DIAGNOSIS — I10 ESSENTIAL HYPERTENSION: ICD-10-CM

## 2023-12-20 NOTE — PROGRESS NOTES
[facility]    Patient ID: Herminia Hernandez is a 38 y.o. female.  : 1985   Contact: [phone]    Encounter date: 2023    PCP: Rocío Bay PA-C      Chief complaint:   Chief Complaint   Patient presents with    Systolic Heart Failure    MVR       Problem List:  Acute systolic heart failure  Echo 2022, RDS: EF 20%, grade 2 diastolic dysfunction, severe MR, LV moderately dilated, LA moderately dilated, RVSP 45 to 55 mmHg, mildly reduced right ventricular systolic function.  LHC, 2022 ; normal coronary arteries. Normal L heart pressures.   Echo, 2022; EF 30%. Moderate to severe LV systolic dysfunction. Global hypokinesis. Moderate MR. Trace TR.   Echo, 2023: EF 35%. Moderate MR. Trace TR.  Echo, 2023: EF 45-50%. Mild MR. Trace TR.  Previous methamphetamine use  Mitral regurgitation  Reported history of COPD  asthma    Allergies   Allergen Reactions    Amoxicillin Swelling       Current Medications:    Current Outpatient Medications:     albuterol sulfate  (90 Base) MCG/ACT inhaler, Inhale 2 puffs 4 (Four) Times a Day. (Patient taking differently: Inhale 2 puffs 4 (Four) Times a Day As Needed for Wheezing or Shortness of Air.), Disp: 18 g, Rfl: 0    bumetanide (BUMEX) 1 MG tablet, Take 1 tablet by mouth Daily., Disp: 90 tablet, Rfl: 3    digoxin (LANOXIN) 125 MCG tablet, TAKE 1 TABLET BY MOUTH DAILY, Disp: 90 tablet, Rfl: 3    doxycycline (VIBRAMYICN) 100 MG tablet, Take 1 tablet by mouth 2 (Two) Times a Day., Disp: 20 tablet, Rfl: 0    levocetirizine (XYZAL) 5 MG tablet, TAKE 1 TABLET BY MOUTH EVERY EVENING, Disp: 90 tablet, Rfl: 0    metoprolol succinate XL (TOPROL-XL) 50 MG 24 hr tablet, Take 1 tablet by mouth Daily., Disp: 30 tablet, Rfl: 11    omeprazole (priLOSEC) 20 MG capsule, TAKE 1 CAPSULE BY MOUTH DAILY, Disp: 30 capsule, Rfl: 5    sacubitril-valsartan (ENTRESTO) 24-26 MG tablet, Take 1 tablet by mouth Every  "12 (Twelve) Hours., Disp: 180 tablet, Rfl: 1    benzonatate (Tessalon Perles) 100 MG capsule, Take 1 capsule by mouth 3 (Three) Times a Day As Needed for Cough. (Patient not taking: Reported on 12/20/2023), Disp: 30 capsule, Rfl: 1    methylPREDNISolone (MEDROL) 4 MG dose pack, Take as directed on package instructions., Disp: 1 each, Rfl: 0    HPI    April Sophia Hernandez is a 38 y.o. female who presents today for a follow up of systolic heart failure and cardiac risk factors. Since last visit, patient has been doing well overall from a cardiovascular standpoint. She notes that she feels great with her current blood pressure. Patient states that her swelling has reduced drastically since her Bumex dose was increased to 1 mg.. She stays busy and active by walking nightly with her . Patient notes that she remains drug free. She denies chest pain, shortness of breath, orthopnea, palpitations, edema, dizziness, and syncope.       The following portions of the patient's history were reviewed and updated as appropriate: allergies, current medications and problem list.    Pertinent positives as listed in the HPI.  All other systems reviewed are negative.         Vitals:    12/20/23 1452   BP: (!) 88/62   BP Location: Left arm   Patient Position: Sitting   Pulse: 88   SpO2: 97%   Weight: 83 kg (183 lb)   Height: 167.6 cm (65.98\")       Physical Exam:  General: Alert and oriented.  Neck: Jugular venous pressure is within normal limits. Carotids have normal upstrokes without bruits.   Cardiovascular: Heart has a nondisplaced focal PMI. Regular rate and rhythm. No murmur, gallop or rub.  Lungs: Clear, no rales or wheezes. Equal expansion is noted.   Extremities: Show no edema.  Skin: Warm and dry.  Neurologic: Nonfocal.     Diagnostic Data (reviewed with patient):  Lab Results   Component Value Date    GLUCOSE 250 (H) 07/24/2023    BUN 9 07/24/2023    CREATININE 0.77 07/24/2023    BCR 11.7 07/24/2023     (L) " 07/24/2023    K 4.4 07/24/2023     07/24/2023    CO2 22.5 07/24/2023    CALCIUM 8.9 07/24/2023    ALBUMIN 3.50 08/24/2022    ALKPHOS 93 08/20/2022    AST 50 (H) 08/20/2022    ALT 45 (H) 08/20/2022              Procedures      Assessment:    ICD-10-CM ICD-9-CM   1. Acute systolic heart failure  I50.21 428.21   2. Nonrheumatic mitral valve regurgitation  I34.0 424.0   3. Essential hypertension  I10 401.9         Plan:  Patient was encouraged to continue to be active and have a healthy diet.  Continue on Bumex 1 mg daily for fluid retention.   Continue on metoprolol succinate 50 mg daily for rate control and hypertension.   Continue on Entresto 24-26 mg BID for hypertension.   Continue all other current medications.  F/up in 6 months, sooner if needed.      Scribed for Orquidea Tom MD by Megan Mojica. 12/20/2023 15:10 EST    I Orquidea Tom MD personally performed the services described in this documentation as scribed by the above individual in my presence, and it is both accurate and complete.    Orquidea Tom MD, FACC

## 2023-12-28 RX ORDER — BUMETANIDE 1 MG/1
1 TABLET ORAL DAILY
Qty: 90 TABLET | Refills: 3 | Status: SHIPPED | OUTPATIENT
Start: 2023-12-28

## 2023-12-28 NOTE — TELEPHONE ENCOUNTER
Lab Results   Component Value Date    GLUCOSE 250 (H) 07/24/2023    CALCIUM 8.9 07/24/2023     (L) 07/24/2023    K 4.4 07/24/2023    CO2 22.5 07/24/2023     07/24/2023    BUN 9 07/24/2023    CREATININE 0.77 07/24/2023    EGFR 100.8 07/24/2023    BCR 11.7 07/24/2023    ANIONGAP 12.5 07/24/2023

## 2024-01-04 RX ORDER — LEVOCETIRIZINE DIHYDROCHLORIDE 5 MG/1
5 TABLET, FILM COATED ORAL EVERY EVENING
Qty: 90 TABLET | Refills: 0 | Status: SHIPPED | OUTPATIENT
Start: 2024-01-04

## 2024-01-04 RX ORDER — OMEPRAZOLE 20 MG/1
20 CAPSULE, DELAYED RELEASE ORAL DAILY
Qty: 30 CAPSULE | Refills: 5 | Status: SHIPPED | OUTPATIENT
Start: 2024-01-04

## 2024-05-10 RX ORDER — LEVOCETIRIZINE DIHYDROCHLORIDE 5 MG/1
5 TABLET, FILM COATED ORAL EVERY EVENING
Qty: 90 TABLET | Refills: 1 | Status: SHIPPED | OUTPATIENT
Start: 2024-05-10

## 2024-05-31 ENCOUNTER — OFFICE VISIT (OUTPATIENT)
Dept: FAMILY MEDICINE CLINIC | Facility: CLINIC | Age: 39
End: 2024-05-31
Payer: COMMERCIAL

## 2024-05-31 VITALS
OXYGEN SATURATION: 98 % | HEIGHT: 66 IN | DIASTOLIC BLOOD PRESSURE: 86 MMHG | HEART RATE: 88 BPM | WEIGHT: 176 LBS | BODY MASS INDEX: 28.28 KG/M2 | SYSTOLIC BLOOD PRESSURE: 132 MMHG | TEMPERATURE: 98.9 F

## 2024-05-31 DIAGNOSIS — R73.9 HIGH BLOOD SUGAR: Primary | ICD-10-CM

## 2024-05-31 DIAGNOSIS — J20.9 ACUTE BRONCHITIS WITH BRONCHOSPASM: ICD-10-CM

## 2024-05-31 DIAGNOSIS — E11.65 TYPE 2 DIABETES MELLITUS WITH HYPERGLYCEMIA, WITHOUT LONG-TERM CURRENT USE OF INSULIN: ICD-10-CM

## 2024-05-31 LAB
A/C: <30
EXPIRATION DATE: ABNORMAL
EXPIRATION DATE: NORMAL
GLUCOSE BLDC GLUCOMTR-MCNC: 430 MG/DL (ref 70–130)
HBA1C MFR BLD: 13.4 % (ref 4.5–5.7)
Lab: ABNORMAL
Lab: NORMAL
POC CREATININE URINE: 10
POC MICROALBUMIN URINE: 10

## 2024-05-31 PROCEDURE — 82044 UR ALBUMIN SEMIQUANTITATIVE: CPT | Performed by: PHYSICIAN ASSISTANT

## 2024-05-31 PROCEDURE — 1159F MED LIST DOCD IN RCRD: CPT | Performed by: PHYSICIAN ASSISTANT

## 2024-05-31 PROCEDURE — 82948 REAGENT STRIP/BLOOD GLUCOSE: CPT | Performed by: PHYSICIAN ASSISTANT

## 2024-05-31 PROCEDURE — 83036 HEMOGLOBIN GLYCOSYLATED A1C: CPT | Performed by: PHYSICIAN ASSISTANT

## 2024-05-31 PROCEDURE — 3046F HEMOGLOBIN A1C LEVEL >9.0%: CPT | Performed by: PHYSICIAN ASSISTANT

## 2024-05-31 PROCEDURE — 1160F RVW MEDS BY RX/DR IN RCRD: CPT | Performed by: PHYSICIAN ASSISTANT

## 2024-05-31 PROCEDURE — 1126F AMNT PAIN NOTED NONE PRSNT: CPT | Performed by: PHYSICIAN ASSISTANT

## 2024-05-31 PROCEDURE — 99214 OFFICE O/P EST MOD 30 MIN: CPT | Performed by: PHYSICIAN ASSISTANT

## 2024-05-31 RX ORDER — BLOOD-GLUCOSE METER
KIT MISCELLANEOUS
Qty: 1 EACH | Refills: 0 | Status: SHIPPED | OUTPATIENT
Start: 2024-05-31

## 2024-05-31 RX ORDER — LANCETS 28 GAUGE
EACH MISCELLANEOUS
Qty: 60 EACH | Refills: 12 | Status: SHIPPED | OUTPATIENT
Start: 2024-05-31

## 2024-05-31 RX ORDER — ALBUTEROL SULFATE 90 UG/1
2 AEROSOL, METERED RESPIRATORY (INHALATION)
Qty: 18 G | Refills: 0 | Status: SHIPPED | OUTPATIENT
Start: 2024-05-31

## 2024-05-31 NOTE — PROGRESS NOTES
Follow Up Office Visit    Date: 2024   Patient Name: Herminia Hernandez  : 1985   MRN: [id number]     Chief Complaint:    Chief Complaint   Patient presents with    Blood Sugar Problem     Pt states that her sugar is running really high  This morning is was 501 with eating yet       History of Present Illness:   Herminia Hernandez is a 38 y.o. female.  History of Present Illness  The patient comes in today having problems with blood sugar. She is accompanied by her mother.    The patient has been experiencing fatigue for approximately 3 weeks, characterized by excessive daytime sleepiness and frequent nocturnal awakenings. She also reports increased urinary frequency over the past few months. A few months ago, she experienced blurred vision, which was attributed to corrective eyewear.  Her diet includes a significant amount of carbonated beverages and water. She denies experiencing numbness, tingling, or pain in her feet. Additionally, she has been experiencing upper abd discomfort for a couple days but no other symptoms.  Her mother is a diabetic and she had checked her blood sugar and it has been running high this morning she stated that she checked it it was 501.   She has a family history of diabetes.        Subjective    Review of systems:  Review of Systems     I have reviewed and the following portions of the patient's history were updated as appropriate: past family history, past medical history, past social history, past surgical history and problem list.    Medications:     Current Outpatient Medications:     albuterol sulfate  (90 Base) MCG/ACT inhaler, Inhale 2 puffs 4 (Four) Times a Day., Disp: 18 g, Rfl: 0    digoxin (LANOXIN) 125 MCG tablet, TAKE 1 TABLET BY MOUTH DAILY, Disp: 90 tablet, Rfl: 3    doxycycline (VIBRAMYICN) 100 MG tablet, Take 1 tablet by mouth 2 (Two) Times a Day., Disp: 20 tablet, Rfl: 0    levocetirizine (XYZAL) 5 MG tablet, TAKE 1 TABLET BY MOUTH EVERY  "EVENING, Disp: 90 tablet, Rfl: 1    metoprolol succinate XL (TOPROL-XL) 50 MG 24 hr tablet, Take 1 tablet by mouth Daily., Disp: 30 tablet, Rfl: 11    omeprazole (priLOSEC) 20 MG capsule, TAKE 1 CAPSULE BY MOUTH DAILY, Disp: 30 capsule, Rfl: 5    sacubitril-valsartan (ENTRESTO) 24-26 MG tablet, Take 1 tablet by mouth Every 12 (Twelve) Hours., Disp: 180 tablet, Rfl: 3    bumetanide (BUMEX) 1 MG tablet, Take 1 tablet by mouth Daily., Disp: 90 tablet, Rfl: 3    glucose blood test strip, Use twice daily to check blood sugar  E11.65, Disp: 60 each, Rfl: 12    glucose monitor monitoring kit, Use twice daily to check blood sugar  E11.65, Disp: 1 each, Rfl: 0    Lancets (freestyle) lancets, Use twice daily to check blood sugar E11.65, Disp: 60 each, Rfl: 12    metFORMIN (GLUCOPHAGE) 500 MG tablet, 1 tablet BID for 2 weeks, then 2 tablets BID, Disp: 120 tablet, Rfl: 5    Allergies:   Allergies   Allergen Reactions    Amoxicillin Swelling       Objective   Vital Signs:   Vitals:    05/31/24 0850   BP: 132/86   Pulse: 88   Temp: 98.9 °F (37.2 °C)   TempSrc: Infrared   SpO2: 98%   Weight: 79.8 kg (176 lb)   Height: 167.6 cm (65.98\")   PainSc: 0-No pain     Body mass index is 28.42 kg/m².          Physical Exam:   Physical Exam  Vitals and nursing note reviewed.   Constitutional:       Appearance: Normal appearance.   HENT:      Head: Normocephalic and atraumatic.      Mouth/Throat:      Mouth: Mucous membranes are moist.      Pharynx: Oropharynx is clear. No posterior oropharyngeal erythema.   Cardiovascular:      Rate and Rhythm: Normal rate and regular rhythm.   Pulmonary:      Effort: Pulmonary effort is normal.      Breath sounds: Normal breath sounds. No decreased breath sounds, wheezing, rhonchi or rales.   Musculoskeletal:      Cervical back: Neck supple.      Right lower leg: No edema.      Left lower leg: No edema.   Lymphadenopathy:      Cervical: No cervical adenopathy.   Neurological:      Mental Status: She is " alert.   Psychiatric:         Mood and Affect: Mood normal.          Procedures     Assessment / Plan    Assessment/Plan:   Diagnoses and all orders for this visit:    1. High blood sugar (Primary)  -     POCT Glucose  -     POC Glycosylated Hemoglobin (Hb A1C)    2. Acute bronchitis with bronchospasm  -     albuterol sulfate  (90 Base) MCG/ACT inhaler; Inhale 2 puffs 4 (Four) Times a Day.  Dispense: 18 g; Refill: 0    3. Type 2 diabetes mellitus with hyperglycemia, without long-term current use of insulin  -     metFORMIN (GLUCOPHAGE) 500 MG tablet; 1 tablet BID for 2 weeks, then 2 tablets BID  Dispense: 120 tablet; Refill: 5  -     glucose blood test strip; Use twice daily to check blood sugar  E11.65  Dispense: 60 each; Refill: 12  -     Lancets (freestyle) lancets; Use twice daily to check blood sugar E11.65  Dispense: 60 each; Refill: 12  -     glucose monitor monitoring kit; Use twice daily to check blood sugar  E11.65  Dispense: 1 each; Refill: 0  -     POCT microalbumin       Assessment & Plan  1. Diabetes.  After a comprehensive discussion regarding various treatment options, the decision has been made to initiate metformin at a dosage of 500 mg twice daily for a period of 2 weeks, after which the dosage will be increased to 1000 mg twice daily. Dietary advice has been discussed. The patient's mother, who is also a diabetic, is planning to work together to enhance the patient's blood glucose levels. The patient has been advised to monitor her blood glucose levels. Prescriptions for a glucometer with supplies have been provided. Microalbumin testing was conducted today.    Follow-up  A follow-up appointment is scheduled for 8 weeks from now, sooner if needed.      Follow Up:   Return in about 8 weeks (around 7/26/2024) for Diabetes, Recheck.    Patient or patient representative verbalized consent for the use of Ambient Listening during the visit with  Rocío Bay PA-C for chart documentation.  5/31/2024  13:27 EDT    Rocío Bay PA-C   [facility] [place]

## 2024-07-26 ENCOUNTER — OFFICE VISIT (OUTPATIENT)
Dept: FAMILY MEDICINE CLINIC | Facility: CLINIC | Age: 39
End: 2024-07-26
Payer: COMMERCIAL

## 2024-07-26 ENCOUNTER — LAB (OUTPATIENT)
Dept: LAB | Facility: HOSPITAL | Age: 39
End: 2024-07-26
Payer: COMMERCIAL

## 2024-07-26 VITALS
SYSTOLIC BLOOD PRESSURE: 120 MMHG | TEMPERATURE: 98.5 F | RESPIRATION RATE: 21 BRPM | OXYGEN SATURATION: 96 % | WEIGHT: 178.6 LBS | DIASTOLIC BLOOD PRESSURE: 70 MMHG | BODY MASS INDEX: 28.84 KG/M2 | HEART RATE: 95 BPM

## 2024-07-26 DIAGNOSIS — E11.65 TYPE 2 DIABETES MELLITUS WITH HYPERGLYCEMIA, WITHOUT LONG-TERM CURRENT USE OF INSULIN: ICD-10-CM

## 2024-07-26 DIAGNOSIS — E11.65 TYPE 2 DIABETES MELLITUS WITH HYPERGLYCEMIA, WITHOUT LONG-TERM CURRENT USE OF INSULIN: Primary | ICD-10-CM

## 2024-07-26 LAB
ALBUMIN SERPL-MCNC: 3.7 G/DL (ref 3.5–5.2)
ALBUMIN/GLOB SERPL: 0.9 G/DL
ALP SERPL-CCNC: 153 U/L (ref 39–117)
ALT SERPL W P-5'-P-CCNC: 83 U/L (ref 1–33)
ANION GAP SERPL CALCULATED.3IONS-SCNC: 13 MMOL/L (ref 5–15)
AST SERPL-CCNC: 89 U/L (ref 1–32)
BILIRUB SERPL-MCNC: 0.3 MG/DL (ref 0–1.2)
BUN SERPL-MCNC: 15 MG/DL (ref 6–20)
BUN/CREAT SERPL: 19.7 (ref 7–25)
CALCIUM SPEC-SCNC: 9.2 MG/DL (ref 8.6–10.5)
CHLORIDE SERPL-SCNC: 94 MMOL/L (ref 98–107)
CHOLEST SERPL-MCNC: 139 MG/DL (ref 0–200)
CO2 SERPL-SCNC: 27 MMOL/L (ref 22–29)
CREAT SERPL-MCNC: 0.76 MG/DL (ref 0.57–1)
EGFRCR SERPLBLD CKD-EPI 2021: 101.7 ML/MIN/1.73
GLOBULIN UR ELPH-MCNC: 4 GM/DL
GLUCOSE SERPL-MCNC: 315 MG/DL (ref 65–99)
HBA1C MFR BLD: 11.7 % (ref 4.8–5.6)
HDLC SERPL-MCNC: 25 MG/DL (ref 40–60)
LDLC SERPL CALC-MCNC: 67 MG/DL (ref 0–100)
LDLC/HDLC SERPL: 2.24 {RATIO}
POTASSIUM SERPL-SCNC: 3.5 MMOL/L (ref 3.5–5.2)
PROT SERPL-MCNC: 7.7 G/DL (ref 6–8.5)
SODIUM SERPL-SCNC: 134 MMOL/L (ref 136–145)
T4 FREE SERPL-MCNC: 1.48 NG/DL (ref 0.92–1.68)
TRIGL SERPL-MCNC: 290 MG/DL (ref 0–150)
TSH SERPL DL<=0.05 MIU/L-ACNC: 1.96 UIU/ML (ref 0.27–4.2)
VLDLC SERPL-MCNC: 47 MG/DL (ref 5–40)

## 2024-07-26 PROCEDURE — 3046F HEMOGLOBIN A1C LEVEL >9.0%: CPT | Performed by: PHYSICIAN ASSISTANT

## 2024-07-26 PROCEDURE — 80061 LIPID PANEL: CPT

## 2024-07-26 PROCEDURE — 84439 ASSAY OF FREE THYROXINE: CPT

## 2024-07-26 PROCEDURE — 80053 COMPREHEN METABOLIC PANEL: CPT

## 2024-07-26 PROCEDURE — 99214 OFFICE O/P EST MOD 30 MIN: CPT | Performed by: PHYSICIAN ASSISTANT

## 2024-07-26 PROCEDURE — 84443 ASSAY THYROID STIM HORMONE: CPT

## 2024-07-26 PROCEDURE — 36415 COLL VENOUS BLD VENIPUNCTURE: CPT

## 2024-07-26 PROCEDURE — 1159F MED LIST DOCD IN RCRD: CPT | Performed by: PHYSICIAN ASSISTANT

## 2024-07-26 PROCEDURE — 83036 HEMOGLOBIN GLYCOSYLATED A1C: CPT

## 2024-07-26 PROCEDURE — 1160F RVW MEDS BY RX/DR IN RCRD: CPT | Performed by: PHYSICIAN ASSISTANT

## 2024-07-26 PROCEDURE — 1126F AMNT PAIN NOTED NONE PRSNT: CPT | Performed by: PHYSICIAN ASSISTANT

## 2024-07-26 RX ORDER — BLOOD-GLUCOSE METER
EACH MISCELLANEOUS
COMMUNITY
Start: 2024-05-31

## 2024-07-26 RX ORDER — BLOOD-GLUCOSE SENSOR
1 EACH MISCELLANEOUS
Qty: 2 EACH | Refills: 11 | Status: SHIPPED | OUTPATIENT
Start: 2024-07-26

## 2024-07-26 RX ORDER — KETOROLAC TROMETHAMINE 30 MG/ML
1 INJECTION, SOLUTION INTRAMUSCULAR; INTRAVENOUS DAILY
Qty: 1 EACH | Refills: 0 | Status: SHIPPED | OUTPATIENT
Start: 2024-07-26

## 2024-07-26 NOTE — PROGRESS NOTES
Follow Up Office Visit    Date: 2024   Patient Name: Hreminia Hernandez  : 1985   MRN: [id number]     Chief Complaint:    Chief Complaint   Patient presents with    Diabetes     F/u       History of Present Illness:   Herminia Hernandez is a 39 y.o. female.  History of Present Illness  The patient presents for evaluation of diabetes. She is accompanied by an adult male.    She reports persistent fatigue and frequent stomach discomfort. She has difficulty pricking her fingers for blood sugar checks. She expresses a desire to switch to a once-weekly injection. Metformin, two tablets in the morning and two at night, causes her to feel unwell, leading to a decreased appetite. Her diet includes dry cereal, cheese, bananas, apples, grapes, pizza, and chicken nuggets.        Subjective    Review of systems:  Review of Systems     I have reviewed and the following portions of the patient's history were updated as appropriate: past family history, past medical history, past social history, past surgical history and problem list.    Medications:     Current Outpatient Medications:     albuterol sulfate  (90 Base) MCG/ACT inhaler, Inhale 2 puffs 4 (Four) Times a Day., Disp: 18 g, Rfl: 0    Blood Glucose Monitoring Suppl (OneTouch Verio Flex System) w/Device kit, USE TWICE DAILY TO CHECK BLOOD SUGAR, Disp: , Rfl:     bumetanide (BUMEX) 1 MG tablet, Take 1 tablet by mouth Daily., Disp: 90 tablet, Rfl: 3    digoxin (LANOXIN) 125 MCG tablet, TAKE 1 TABLET BY MOUTH DAILY, Disp: 90 tablet, Rfl: 3    doxycycline (VIBRAMYICN) 100 MG tablet, Take 1 tablet by mouth 2 (Two) Times a Day., Disp: 20 tablet, Rfl: 0    glucose blood test strip, Use twice daily to check blood sugar  E11.65, Disp: 60 each, Rfl: 12    glucose monitor monitoring kit, Use twice daily to check blood sugar  E11.65, Disp: 1 each, Rfl: 0    Lancets (freestyle) lancets, Use twice daily to check blood sugar E11.65, Disp: 60 each, Rfl: 12     levocetirizine (XYZAL) 5 MG tablet, TAKE 1 TABLET BY MOUTH EVERY EVENING, Disp: 90 tablet, Rfl: 1    metFORMIN (GLUCOPHAGE) 500 MG tablet, 1 tablet BID for 2 weeks, then 2 tablets BID, Disp: 120 tablet, Rfl: 5    metoprolol succinate XL (TOPROL-XL) 50 MG 24 hr tablet, Take 1 tablet by mouth Daily., Disp: 30 tablet, Rfl: 11    omeprazole (priLOSEC) 20 MG capsule, TAKE 1 CAPSULE BY MOUTH DAILY, Disp: 30 capsule, Rfl: 5    sacubitril-valsartan (ENTRESTO) 24-26 MG tablet, Take 1 tablet by mouth Every 12 (Twelve) Hours., Disp: 180 tablet, Rfl: 3    Tirzepatide (MOUNJARO) 2.5 MG/0.5ML solution pen-injector pen, Inject 0.5 mL under the skin into the appropriate area as directed 1 (One) Time Per Week., Disp: 2 mL, Rfl: 2    Allergies:   Allergies   Allergen Reactions    Amoxicillin Swelling       Objective   Vital Signs:   Vitals:    07/26/24 0934   BP: 120/70   BP Location: Left arm   Patient Position: Sitting   Cuff Size: Adult   Pulse: 95   Resp: 21   Temp: 98.5 °F (36.9 °C)   TempSrc: Infrared   SpO2: 96%   Weight: 81 kg (178 lb 9.6 oz)     Body mass index is 28.84 kg/m².          Physical Exam:   Physical Exam  Vitals and nursing note reviewed.   Constitutional:       Appearance: Normal appearance.   HENT:      Head: Normocephalic and atraumatic.   Cardiovascular:      Rate and Rhythm: Normal rate and regular rhythm.   Pulmonary:      Effort: Pulmonary effort is normal.      Breath sounds: Normal breath sounds.   Musculoskeletal:      Cervical back: Neck supple.      Right lower leg: No edema.      Left lower leg: No edema.   Neurological:      Mental Status: She is alert.          Procedures     Assessment / Plan    Assessment/Plan:   Diagnoses and all orders for this visit:    1. Type 2 diabetes mellitus with hyperglycemia, without long-term current use of insulin (Primary)  -     Tirzepatide (MOUNJARO) 2.5 MG/0.5ML solution pen-injector pen; Inject 0.5 mL under the skin into the appropriate area as directed 1 (One)  Time Per Week.  Dispense: 2 mL; Refill: 2  -     Hemoglobin A1c; Future  -     Comprehensive metabolic panel; Future  -     Lipid panel; Future  -     T4, free; Future  -     TSH; Future       Assessment & Plan  1. Diabetes.  Her last A1c was over 13 in 05/2024. She has been taking metformin 1000 mg twice daily. She has been having a hard time tolerating this medication, so hopefully we can do the Mounjaro and back down on the metformin to 500 mg twice a day and see if this is better tolerated. Her blood sugars have been ranging anywhere from about 170s and up into the 400s, so she really does need a better way to check her blood sugar. I will add Mounjaro and see if she can tolerate this. I will check her A1c today. I will try to get the FreeStyle Filippo prior approved as she has been pricking her fingers a lot and they are really starting to hurt. I discussed how this medication works and possible side effects.    Follow-up  The patient will follow up in 1 month to see how things are going, sooner if needed.      Follow Up:   Return in about 4 weeks (around 8/23/2024) for DM recheck, Recheck.    Patient or patient representative verbalized consent for the use of Ambient Listening during the visit with  Rocío Bay PA-C for chart documentation. 8/8/2024  19:00 EDT    Rocío Bay PA-C   [facility] [place] [place]

## 2024-07-29 DIAGNOSIS — E11.65 TYPE 2 DIABETES MELLITUS WITH HYPERGLYCEMIA, WITHOUT LONG-TERM CURRENT USE OF INSULIN: ICD-10-CM

## 2024-07-30 ENCOUNTER — PRIOR AUTHORIZATION (OUTPATIENT)
Dept: FAMILY MEDICINE CLINIC | Facility: CLINIC | Age: 39
End: 2024-07-30
Payer: COMMERCIAL

## 2024-07-30 NOTE — TELEPHONE ENCOUNTER
7/30/24    PA denied    Herminia Hernandez   (Morales: [id number])  PA Case ID #: [id number]  Rx #: [id number]  Denied today by [place] [employer] Med[employer]act 2017  This request has not been approved. Based on the information sent for review, the requested drug did not meet our guideline rules. To get the request approved, your doctor needs to show that you have met the guideline rules below. If you have questions, please call your doctor. In some cases, the requested drug or alternatives offered may have other guideline rules that need to be met. Our guideline named GLP-1 RECEPTOR AGONISTS requires the following rule(s) be met for approval: 1. The member has had at least a 3-month trial and failure [drug did not work], allergy, contraindication [harmful for] (including potential drug-drug interactions with other medications) or intolerance [side effect] to two preferred GLP-1 agonists [drug in the same group]: Byetta, Ozempic, Trulicity, Victoza.Your provider told us you have a diagnosis of type 2 diabetes (a disorder with high blood sugar). We do not have records or chart notes showing you have tried 2 preferred medications listed above (such as Byetta, Ozempic, Trulicity, or Victoza) for at least 3 months. This is why your request is denied. Please work with your doctor to use a different medication or get us more information if it will allow us to approve this request. A written notification letter will follow with additional details.  Drug  Mounjaro 2.5MG/0.5ML pen-injectors

## 2024-08-02 ENCOUNTER — TELEPHONE (OUTPATIENT)
Dept: FAMILY MEDICINE CLINIC | Facility: CLINIC | Age: 39
End: 2024-08-02
Payer: COMMERCIAL

## 2024-08-02 NOTE — TELEPHONE ENCOUNTER
Hub staff attempted to follow warm transfer process and was unsuccessful     Caller: Herminia Hernandez    Relationship to patient: Self    Best call back number: [phone]    P A FOR MOUNJARO WAS DENIED; IS THERE AN ALTERNATIVE MEDICATION

## 2024-08-06 ENCOUNTER — TELEPHONE (OUTPATIENT)
Dept: FAMILY MEDICINE CLINIC | Facility: CLINIC | Age: 39
End: 2024-08-06
Payer: COMMERCIAL

## 2024-08-06 NOTE — TELEPHONE ENCOUNTER
Patient called in to check the status of her blood sugar meter along with her Mounjaro 2.5    Please call back with update [phone]

## 2024-08-07 DIAGNOSIS — E11.65 TYPE 2 DIABETES MELLITUS WITH HYPERGLYCEMIA, WITHOUT LONG-TERM CURRENT USE OF INSULIN: Primary | ICD-10-CM

## 2024-08-07 NOTE — TELEPHONE ENCOUNTER
Please let her know I sent in a prescription for the other injection the Ozempic for the diabetes.  Hopefully we will have better luck with this.

## 2024-08-08 ENCOUNTER — PRIOR AUTHORIZATION (OUTPATIENT)
Dept: FAMILY MEDICINE CLINIC | Facility: CLINIC | Age: 39
End: 2024-08-08
Payer: COMMERCIAL

## 2024-08-08 ENCOUNTER — OFFICE VISIT (OUTPATIENT)
Dept: CARDIOLOGY | Facility: CLINIC | Age: 39
End: 2024-08-08
Payer: COMMERCIAL

## 2024-08-08 VITALS
SYSTOLIC BLOOD PRESSURE: 102 MMHG | WEIGHT: 176.4 LBS | OXYGEN SATURATION: 98 % | DIASTOLIC BLOOD PRESSURE: 54 MMHG | BODY MASS INDEX: 28.35 KG/M2 | HEIGHT: 66 IN | HEART RATE: 106 BPM

## 2024-08-08 DIAGNOSIS — I34.0 NONRHEUMATIC MITRAL VALVE REGURGITATION: ICD-10-CM

## 2024-08-08 DIAGNOSIS — I50.22 CHRONIC SYSTOLIC (CONGESTIVE) HEART FAILURE: Primary | ICD-10-CM

## 2024-08-08 DIAGNOSIS — R00.0 SINUS TACHYCARDIA: ICD-10-CM

## 2024-08-08 DIAGNOSIS — I10 ESSENTIAL HYPERTENSION: ICD-10-CM

## 2024-08-08 NOTE — TELEPHONE ENCOUNTER
PA started 08/08/2024 April David   (Morales: [id number])  Rx #: [id number]    FreeStyle Filippo 3 [place] device    Herminia Hernandez   (Morales: [id number])  Rx #: [id number]    FreeStyle Filippo 3 Sensor

## 2024-08-08 NOTE — PROGRESS NOTES
[facility]    Patient ID: Herminia Hernandez is a 39 y.o. female.  : 1985   Contact: Home phone not available    Encounter date: 2024    PCP: Rocío Bay PA-C      Chief complaint:   Chief Complaint   Patient presents with    Acute systolic heart failure       Problem List:  Chronic systolic heart failure  Echo 2022, RDS: EF 20%, grade 2 diastolic dysfunction, severe MR, LV moderately dilated, LA moderately dilated, RVSP 45 to 55 mmHg, mildly reduced right ventricular systolic function.  LHC, 2022 ; normal coronary arteries. Normal L heart pressures.   Echo, 2022; EF 30%. Moderate to severe LV systolic dysfunction. Global hypokinesis. Moderate MR. Trace TR.   Echo, 2023: EF 35%. Moderate MR. Trace TR.  Echo, 2023: EF 45-50%. Mild MR. Trace TR.  Sinus tachycardia  Previous methamphetamine use  Mitral regurgitation  Reported history of COPD  asthma    Allergies   Allergen Reactions    Amoxicillin Swelling       Current Medications:    Current Outpatient Medications:     albuterol sulfate  (90 Base) MCG/ACT inhaler, Inhale 2 puffs 4 (Four) Times a Day., Disp: 18 g, Rfl: 0    Blood Glucose Monitoring Suppl (OneTouch Verio Flex System) w/Device kit, USE TWICE DAILY TO CHECK BLOOD SUGAR, Disp: , Rfl:     bumetanide (BUMEX) 1 MG tablet, Take 1 tablet by mouth Daily., Disp: 90 tablet, Rfl: 3    Continuous Glucose  (FreeStyle Filippo 3 [place]) device, Use 1 Units Daily. E11.65, Disp: 1 each, Rfl: 0    Continuous Glucose Sensor (FreeStyle Filippo 3 Sensor) misc, Use 1 Units Every 14 (Fourteen) Days. E11.65, Disp: 2 each, Rfl: 11    digoxin (LANOXIN) 125 MCG tablet, TAKE 1 TABLET BY MOUTH DAILY, Disp: 90 tablet, Rfl: 3    doxycycline (VIBRAMYICN) 100 MG tablet, Take 1 tablet by mouth 2 (Two) Times a Day., Disp: 20 tablet, Rfl: 0    glucose blood test strip, Use twice daily to check blood sugar  E11.65, Disp: 60 each, Rfl:  12    glucose monitor monitoring kit, Use twice daily to check blood sugar  E11.65, Disp: 1 each, Rfl: 0    Lancets (freestyle) lancets, Use twice daily to check blood sugar E11.65, Disp: 60 each, Rfl: 12    levocetirizine (XYZAL) 5 MG tablet, TAKE 1 TABLET BY MOUTH EVERY EVENING, Disp: 90 tablet, Rfl: 1    metFORMIN (GLUCOPHAGE) 500 MG tablet, 1 tablet BID for 2 weeks, then 2 tablets BID (Patient taking differently: 1 tablet in the morning and one at night), Disp: 120 tablet, Rfl: 5    metoprolol succinate XL (TOPROL-XL) 50 MG 24 hr tablet, Take 1 tablet by mouth Daily., Disp: 30 tablet, Rfl: 11    omeprazole (priLOSEC) 20 MG capsule, TAKE 1 CAPSULE BY MOUTH DAILY, Disp: 30 capsule, Rfl: 5    sacubitril-valsartan (ENTRESTO) 24-26 MG tablet, Take 1 tablet by mouth Every 12 (Twelve) Hours., Disp: 180 tablet, Rfl: 3    Semaglutide,0.25 or 0.5MG/DOS, (OZEMPIC) 2 MG/3ML solution pen-injector, Inject 0.25 mg under the skin into the appropriate area as directed 1 (One) Time Per Week., Disp: 3 mL, Rfl: 2    HPI    April Sophia Hernandez is a 39 y.o. female who presents today for a follow up of systolic heart failure with recovered EF, tachycardia and cardiac risk factors. Since last visit, patient overall has been doing well.  She was recently seen by her PCP and diagnosed with diabetes.  She has been placed on Ozempic and had her first injection yesterday.  On her current dose of Bumex patient is not experiencing any swelling to her legs.  Her heart rate is elevated at 106.  She has been taking digoxin and now metoprolol for quite some time to help regulate her heart rate.  She is also on low-dose Entresto to help with her heart failure.  Patient denies any chest pain or increased shortness of breath at this time.      The following portions of the patient's history were reviewed and updated as appropriate: allergies, current medications and problem list.    Pertinent positives as listed in the HPI.  All other systems  "reviewed are negative.         Vitals:    08/08/24 1533   BP: 102/54   BP Location: Left arm   Patient Position: Sitting   Pulse: 106   SpO2: 98%   Weight: 80 kg (176 lb 6.4 oz)   Height: 167.6 cm (65.98\")       Physical Exam:  General: Alert and oriented.  Neck: Jugular venous pressure is within normal limits. Carotids have normal upstrokes without bruits.   Cardiovascular:  Regular rate and rhythm. No murmur, gallop or rub.  Lungs: Clear, no rales or wheezes. Equal expansion is noted.   Extremities: Show no edema.  Skin: Warm and dry.  Neurologic: Nonfocal.     Diagnostic Data (reviewed with patient):  Lab Results   Component Value Date    GLUCOSE 315 (H) 07/26/2024    BUN 15 07/26/2024    CREATININE 0.76 07/26/2024    BCR 19.7 07/26/2024     (L) 07/26/2024    K 3.5 07/26/2024    CL 94 (L) 07/26/2024    CO2 27.0 07/26/2024    CALCIUM 9.2 07/26/2024    ALBUMIN 3.7 07/26/2024    ALKPHOS 153 (H) 07/26/2024    AST 89 (H) 07/26/2024    ALT 83 (H) 07/26/2024     Lab Results   Component Value Date    CHOL 139 07/26/2024    TRIG 290 (H) 07/26/2024    HDL 25 (L) 07/26/2024    LDL 67 07/26/2024      Lab Results   Component Value Date    TSH 1.960 07/26/2024                 Assessment:    ICD-10-CM ICD-9-CM   1. Chronic systolic heart failure  I50.22 428.22     428.0   2. Nonrheumatic mitral valve regurgitation  I34.0 424.0   3. Essential hypertension  I10 401.9   4. Sinus tachycardia  R00.0 427.89         Plan:  Patient has stable cardiac status at this time with no signs of heart failure.  I discussed with the patient we would do follow-up echoes every 3 to 5 years or sooner if she has recurrent heart failure symptoms.  Continue on Bumex 1 mg daily for fluid retention.  I discussed with the patient that this may need to be reduced based on her oral intake while on Ozempic.  If she is feeling dehydrated or having low blood pressure she needs to contact us immediately.  Continue on digoxin 125 mcg and metoprolol " succinate 50 mg daily for rate control with sinus tachycardia.  Patient has been on digoxin for quite some time due to her having hypotension.  Continue on Entresto 24-26 mg BID for hypertension and cardiomyopathy.   Continue all other current medications.  F/up in 12 months, sooner if needed.      Margie Blackmon PA-C

## 2024-08-09 NOTE — TELEPHONE ENCOUNTER
[id number]    Question sent back to insurance to see if PA will go through.    FreeStyle Filippo 3 [place] device    Approved today by [place] [employer] MedImpact 2017  The request has been approved. The authorization is effective from 08/09/2024 to 08/08/2025, as long as the member is enrolled in their current health plan. The request was approved as submitted. Please note: Your plan only allows 2 sensors per 30 days.A second prior authorization [id number] has been entered for FREESTYLE FILIPPO 3 READER, this request is effective from 08/09/2024 and is valid until 08/08/2025. A written notification letter will follow with additional details.

## 2024-08-12 NOTE — TELEPHONE ENCOUNTER
Herminia Hernandez   (Morales: [id number])  PA Case ID #: [id number]  Rx #: [id number]  N/A on August 9 by [place] [employer] MedImpact 2017  Prior Authorization is not required for this medication dosage form and strength at the quantity and days supply requested. Please note: your plan limits to a quantity of 1 reader every 365 days. Please work with your pharmacy to have them bill the requested 1 reader for a 365 day supply.  Drug  FreeStyle Filippo 3 [place] device

## 2024-09-16 ENCOUNTER — LAB (OUTPATIENT)
Dept: LAB | Facility: HOSPITAL | Age: 39
End: 2024-09-16
Payer: COMMERCIAL

## 2024-09-16 ENCOUNTER — OFFICE VISIT (OUTPATIENT)
Dept: FAMILY MEDICINE CLINIC | Facility: CLINIC | Age: 39
End: 2024-09-16
Payer: COMMERCIAL

## 2024-09-16 VITALS
BODY MASS INDEX: 28.54 KG/M2 | DIASTOLIC BLOOD PRESSURE: 72 MMHG | HEIGHT: 66 IN | OXYGEN SATURATION: 97 % | HEART RATE: 94 BPM | WEIGHT: 177.6 LBS | TEMPERATURE: 97.9 F | SYSTOLIC BLOOD PRESSURE: 116 MMHG

## 2024-09-16 DIAGNOSIS — E11.65 TYPE 2 DIABETES MELLITUS WITH HYPERGLYCEMIA, WITHOUT LONG-TERM CURRENT USE OF INSULIN: Primary | ICD-10-CM

## 2024-09-16 LAB
ALBUMIN SERPL-MCNC: 3.7 G/DL (ref 3.5–5.2)
ALBUMIN/GLOB SERPL: 0.9 G/DL
ALP SERPL-CCNC: 126 U/L (ref 39–117)
ALT SERPL W P-5'-P-CCNC: 82 U/L (ref 1–33)
ANION GAP SERPL CALCULATED.3IONS-SCNC: 12.1 MMOL/L (ref 5–15)
AST SERPL-CCNC: 89 U/L (ref 1–32)
BILIRUB SERPL-MCNC: 0.3 MG/DL (ref 0–1.2)
BUN SERPL-MCNC: 10 MG/DL (ref 6–20)
BUN/CREAT SERPL: 14.5 (ref 7–25)
CALCIUM SPEC-SCNC: 9.2 MG/DL (ref 8.6–10.5)
CHLORIDE SERPL-SCNC: 98 MMOL/L (ref 98–107)
CO2 SERPL-SCNC: 27.9 MMOL/L (ref 22–29)
CREAT SERPL-MCNC: 0.69 MG/DL (ref 0.57–1)
EGFRCR SERPLBLD CKD-EPI 2021: 112.7 ML/MIN/1.73
GLOBULIN UR ELPH-MCNC: 4.1 GM/DL
GLUCOSE SERPL-MCNC: 194 MG/DL (ref 65–99)
HBA1C MFR BLD: 8.8 % (ref 4.8–5.6)
POTASSIUM SERPL-SCNC: 3.9 MMOL/L (ref 3.5–5.2)
PROT SERPL-MCNC: 7.8 G/DL (ref 6–8.5)
SODIUM SERPL-SCNC: 138 MMOL/L (ref 136–145)

## 2024-09-16 PROCEDURE — 1159F MED LIST DOCD IN RCRD: CPT | Performed by: PHYSICIAN ASSISTANT

## 2024-09-16 PROCEDURE — 36415 COLL VENOUS BLD VENIPUNCTURE: CPT | Performed by: PHYSICIAN ASSISTANT

## 2024-09-16 PROCEDURE — 83036 HEMOGLOBIN GLYCOSYLATED A1C: CPT | Performed by: PHYSICIAN ASSISTANT

## 2024-09-16 PROCEDURE — 80053 COMPREHEN METABOLIC PANEL: CPT | Performed by: PHYSICIAN ASSISTANT

## 2024-09-16 PROCEDURE — 1160F RVW MEDS BY RX/DR IN RCRD: CPT | Performed by: PHYSICIAN ASSISTANT

## 2024-09-16 PROCEDURE — 3052F HG A1C>EQUAL 8.0%<EQUAL 9.0%: CPT | Performed by: PHYSICIAN ASSISTANT

## 2024-09-16 PROCEDURE — 1126F AMNT PAIN NOTED NONE PRSNT: CPT | Performed by: PHYSICIAN ASSISTANT

## 2024-09-16 PROCEDURE — 99214 OFFICE O/P EST MOD 30 MIN: CPT | Performed by: PHYSICIAN ASSISTANT

## 2024-10-23 RX ORDER — METOPROLOL SUCCINATE 50 MG/1
50 TABLET, EXTENDED RELEASE ORAL DAILY
Qty: 30 TABLET | Refills: 11 | Status: SHIPPED | OUTPATIENT
Start: 2024-10-23

## 2024-11-13 RX ORDER — LEVOCETIRIZINE DIHYDROCHLORIDE 5 MG/1
5 TABLET, FILM COATED ORAL EVERY EVENING
Qty: 90 TABLET | Refills: 0 | Status: SHIPPED | OUTPATIENT
Start: 2024-11-13

## 2024-11-18 ENCOUNTER — OFFICE VISIT (OUTPATIENT)
Dept: FAMILY MEDICINE CLINIC | Facility: CLINIC | Age: 39
End: 2024-11-18
Payer: COMMERCIAL

## 2024-11-18 VITALS
WEIGHT: 176.4 LBS | HEART RATE: 70 BPM | OXYGEN SATURATION: 100 % | BODY MASS INDEX: 28.35 KG/M2 | DIASTOLIC BLOOD PRESSURE: 58 MMHG | TEMPERATURE: 97.5 F | SYSTOLIC BLOOD PRESSURE: 106 MMHG | HEIGHT: 66 IN

## 2024-11-18 DIAGNOSIS — M79.671 RIGHT FOOT PAIN: Primary | ICD-10-CM

## 2024-11-18 DIAGNOSIS — E11.65 TYPE 2 DIABETES MELLITUS WITH HYPERGLYCEMIA, WITHOUT LONG-TERM CURRENT USE OF INSULIN: ICD-10-CM

## 2024-11-18 PROCEDURE — 1126F AMNT PAIN NOTED NONE PRSNT: CPT | Performed by: PHYSICIAN ASSISTANT

## 2024-11-18 PROCEDURE — 1159F MED LIST DOCD IN RCRD: CPT | Performed by: PHYSICIAN ASSISTANT

## 2024-11-18 PROCEDURE — 99213 OFFICE O/P EST LOW 20 MIN: CPT | Performed by: PHYSICIAN ASSISTANT

## 2024-11-18 PROCEDURE — 1160F RVW MEDS BY RX/DR IN RCRD: CPT | Performed by: PHYSICIAN ASSISTANT

## 2024-11-18 PROCEDURE — 3052F HG A1C>EQUAL 8.0%<EQUAL 9.0%: CPT | Performed by: PHYSICIAN ASSISTANT
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
## 2025-01-28 ENCOUNTER — LAB (OUTPATIENT)
Dept: LAB | Facility: HOSPITAL | Age: 40
End: 2025-01-28
Payer: COMMERCIAL

## 2025-01-28 ENCOUNTER — OFFICE VISIT (OUTPATIENT)
Dept: FAMILY MEDICINE CLINIC | Facility: CLINIC | Age: 40
End: 2025-01-28
Payer: COMMERCIAL

## 2025-01-28 VITALS
OXYGEN SATURATION: 98 % | HEART RATE: 80 BPM | TEMPERATURE: 98.2 F | SYSTOLIC BLOOD PRESSURE: 116 MMHG | HEIGHT: 66 IN | WEIGHT: 187 LBS | BODY MASS INDEX: 30.05 KG/M2 | DIASTOLIC BLOOD PRESSURE: 60 MMHG

## 2025-01-28 DIAGNOSIS — K21.9 GASTROESOPHAGEAL REFLUX DISEASE, UNSPECIFIED WHETHER ESOPHAGITIS PRESENT: ICD-10-CM

## 2025-01-28 DIAGNOSIS — J44.9 CHRONIC OBSTRUCTIVE PULMONARY DISEASE, UNSPECIFIED COPD TYPE: ICD-10-CM

## 2025-01-28 DIAGNOSIS — E11.65 TYPE 2 DIABETES MELLITUS WITH HYPERGLYCEMIA, WITHOUT LONG-TERM CURRENT USE OF INSULIN: Primary | ICD-10-CM

## 2025-01-28 DIAGNOSIS — E11.65 TYPE 2 DIABETES MELLITUS WITH HYPERGLYCEMIA, WITHOUT LONG-TERM CURRENT USE OF INSULIN: ICD-10-CM

## 2025-01-28 DIAGNOSIS — R10.10 UPPER ABDOMINAL PAIN: ICD-10-CM

## 2025-01-28 LAB
ALBUMIN SERPL-MCNC: 3.3 G/DL (ref 3.5–5.2)
ALBUMIN/GLOB SERPL: 0.8 G/DL
ALP SERPL-CCNC: 145 U/L (ref 39–117)
ALT SERPL W P-5'-P-CCNC: 72 U/L (ref 1–33)
ANION GAP SERPL CALCULATED.3IONS-SCNC: 10.9 MMOL/L (ref 5–15)
AST SERPL-CCNC: 93 U/L (ref 1–32)
BILIRUB SERPL-MCNC: 0.4 MG/DL (ref 0–1.2)
BUN SERPL-MCNC: 14 MG/DL (ref 6–20)
BUN/CREAT SERPL: 18.4 (ref 7–25)
CALCIUM SPEC-SCNC: 9.5 MG/DL (ref 8.6–10.5)
CHLORIDE SERPL-SCNC: 96 MMOL/L (ref 98–107)
CHOLEST SERPL-MCNC: 137 MG/DL (ref 0–200)
CO2 SERPL-SCNC: 28.1 MMOL/L (ref 22–29)
CREAT SERPL-MCNC: 0.76 MG/DL (ref 0.57–1)
EGFRCR SERPLBLD CKD-EPI 2021: 101.7 ML/MIN/1.73
GLOBULIN UR ELPH-MCNC: 4.4 GM/DL
GLUCOSE SERPL-MCNC: 119 MG/DL (ref 65–99)
HBA1C MFR BLD: 7.8 % (ref 4.8–5.6)
HDLC SERPL-MCNC: 37 MG/DL (ref 40–60)
LDLC SERPL CALC-MCNC: 74 MG/DL (ref 0–100)
LDLC/HDLC SERPL: 1.89 {RATIO}
POTASSIUM SERPL-SCNC: 4.1 MMOL/L (ref 3.5–5.2)
PROT SERPL-MCNC: 7.7 G/DL (ref 6–8.5)
SODIUM SERPL-SCNC: 135 MMOL/L (ref 136–145)
TRIGL SERPL-MCNC: 150 MG/DL (ref 0–150)
VLDLC SERPL-MCNC: 26 MG/DL (ref 5–40)

## 2025-01-28 PROCEDURE — 1159F MED LIST DOCD IN RCRD: CPT | Performed by: PHYSICIAN ASSISTANT

## 2025-01-28 PROCEDURE — 36415 COLL VENOUS BLD VENIPUNCTURE: CPT | Performed by: PHYSICIAN ASSISTANT

## 2025-01-28 PROCEDURE — 80053 COMPREHEN METABOLIC PANEL: CPT | Performed by: PHYSICIAN ASSISTANT

## 2025-01-28 PROCEDURE — 99214 OFFICE O/P EST MOD 30 MIN: CPT | Performed by: PHYSICIAN ASSISTANT

## 2025-01-28 PROCEDURE — 1160F RVW MEDS BY RX/DR IN RCRD: CPT | Performed by: PHYSICIAN ASSISTANT

## 2025-01-28 PROCEDURE — 1126F AMNT PAIN NOTED NONE PRSNT: CPT | Performed by: PHYSICIAN ASSISTANT

## 2025-01-28 PROCEDURE — 83036 HEMOGLOBIN GLYCOSYLATED A1C: CPT

## 2025-01-28 PROCEDURE — 80061 LIPID PANEL: CPT

## 2025-01-28 RX ORDER — ALBUTEROL SULFATE 90 UG/1
2 INHALANT RESPIRATORY (INHALATION)
Qty: 18 G | Refills: 5 | Status: SHIPPED | OUTPATIENT
Start: 2025-01-28

## 2025-01-28 RX ORDER — LEVOCETIRIZINE DIHYDROCHLORIDE 5 MG/1
5 TABLET, FILM COATED ORAL EVERY EVENING
Qty: 90 TABLET | Refills: 3 | Status: SHIPPED | OUTPATIENT
Start: 2025-01-28

## 2025-01-28 RX ORDER — DOXYCYCLINE HYCLATE 100 MG
100 TABLET ORAL 2 TIMES DAILY
Qty: 20 TABLET | Refills: 0 | Status: CANCELLED | OUTPATIENT
Start: 2025-01-28

## 2025-01-28 NOTE — PROGRESS NOTES
Follow Up Office Visit    Date: 2025   Patient Name: Herminia Hernandez  : 1985   MRN: [id number]     Chief Complaint:    Chief Complaint   Patient presents with    Diabetes     Pt states that the provider told her she was doing labs this visit        History of Present Illness:   Herminia Hernandez is a 39 y.o. female.  History of Present Illness  The patient presents for evaluation of diabetes, abdominal bloating, ear infection, and medication management.    She reports experiencing nausea and vomiting 2 to 3 days after administering her Ozempic injection, which she attributes to the increased dosage. She also mentions a general feeling of malaise and fatigue, often necessitating self-induced vomiting. Additionally, she has been experiencing weight gain.    She describes intermittent episodes of abdominal distension, likening it to the sensation of being pregnant. The abdomen feels swollen and hard, occasionally accompanied by pain. These symptoms are not associated with any specific food intake. She also reports occasional swelling on the right side of her rib cage, which is sometimes painful. She does not experience constipation and continues to have regular bowel movements. She retains her gallbladder and has no history of kidney stones.    She requests a refill of her inhaler, stomach medication, and Xyzal.     She reports redness in her ears, which she believes may be indicative of an ear infection. However, she does not experience any associated earache.    FAMILY HISTORY  Kidney stones run in the family.    MEDICATIONS  Current: Xyzal  Past: doxycycline        Subjective    Review of systems:  Review of Systems     I have reviewed and the following portions of the patient's history were updated as appropriate: past family history, past medical history, past social history, past surgical history and problem list.    Medications:     Current Outpatient Medications:     albuterol sulfate HFA  108 (90 Base) MCG/ACT inhaler, Inhale 2 puffs 4 (Four) Times a Day., Disp: 18 g, Rfl: 5    Blood Glucose Monitoring Suppl (OneTouch Verio Flex System) w/Device kit, USE TWICE DAILY TO CHECK BLOOD SUGAR, Disp: , Rfl:     bumetanide (BUMEX) 1 MG tablet, Take 1 tablet by mouth Daily., Disp: 90 tablet, Rfl: 3    Continuous Glucose  (FreeStyle Filippo 3 [place]) device, Use 1 Units Daily. E11.65, Disp: 1 each, Rfl: 0    Continuous Glucose Sensor (FreeStyle Filippo 3 Sensor) misc, Use 1 Units Every 14 (Fourteen) Days. E11.65, Disp: 2 each, Rfl: 11    digoxin (LANOXIN) 125 MCG tablet, TAKE 1 TABLET BY MOUTH DAILY, Disp: 90 tablet, Rfl: 3    doxycycline (VIBRAMYICN) 100 MG tablet, Take 1 tablet by mouth 2 (Two) Times a Day., Disp: 20 tablet, Rfl: 0    glucose blood test strip, Use twice daily to check blood sugar  E11.65, Disp: 60 each, Rfl: 12    glucose monitor monitoring kit, Use twice daily to check blood sugar  E11.65, Disp: 1 each, Rfl: 0    Lancets (freestyle) lancets, Use twice daily to check blood sugar E11.65, Disp: 60 each, Rfl: 12    levocetirizine (XYZAL) 5 MG tablet, Take 1 tablet by mouth Every Evening., Disp: 90 tablet, Rfl: 3    metFORMIN (GLUCOPHAGE) 500 MG tablet, 1 tablet BID for 2 weeks, then 2 tablets BID (Patient taking differently: 1 tablet in the morning and one at night), Disp: 120 tablet, Rfl: 5    metoprolol succinate XL (TOPROL-XL) 50 MG 24 hr tablet, Take 1 tablet by mouth Daily., Disp: 30 tablet, Rfl: 11    omeprazole (priLOSEC) 20 MG capsule, Take 1 capsule by mouth Daily., Disp: 90 capsule, Rfl: 3    sacubitril-valsartan (ENTRESTO) 24-26 MG tablet, Take 1 tablet by mouth Every 12 (Twelve) Hours., Disp: 180 tablet, Rfl: 3    Semaglutide,0.25 or 0.5MG/DOS, (OZEMPIC) 2 MG/3ML solution pen-injector, Inject 0.5 mg under the skin into the appropriate area as directed 1 (One) Time Per Week., Disp: 3 mL, Rfl: 5    Allergies:   Allergies   Allergen Reactions    Amoxicillin Swelling       Objective  "  Vital Signs:   Vitals:    01/28/25 0844   BP: 116/60   Pulse: 80   Temp: 98.2 °F (36.8 °C)   TempSrc: Infrared   SpO2: 98%   Weight: 84.8 kg (187 lb)   Height: 167.6 cm (65.98\")   PainSc: 0-No pain     Body mass index is 30.2 kg/m².          Physical Exam:   Physical Exam  Vitals and nursing note reviewed.   Constitutional:       Appearance: Normal appearance.   HENT:      Head: Normocephalic and atraumatic.      Right Ear: Tympanic membrane and ear canal normal.      Left Ear: Tympanic membrane and ear canal normal.      Nose: No congestion or rhinorrhea.      Mouth/Throat:      Mouth: Mucous membranes are moist.      Pharynx: Oropharynx is clear. No posterior oropharyngeal erythema.   Cardiovascular:      Rate and Rhythm: Normal rate and regular rhythm.   Pulmonary:      Effort: Pulmonary effort is normal.      Breath sounds: Normal breath sounds. No decreased breath sounds, wheezing, rhonchi or rales.   Abdominal:      General: Bowel sounds are normal.      Palpations: Abdomen is soft.      Tenderness: There is abdominal tenderness (upper). There is no guarding or rebound.   Musculoskeletal:      Cervical back: Neck supple.      Right lower leg: No edema.      Left lower leg: No edema.   Lymphadenopathy:      Cervical: No cervical adenopathy.   Neurological:      Mental Status: She is alert.          Procedures     Assessment / Plan    Assessment/Plan:   Diagnoses and all orders for this visit:    1. Type 2 diabetes mellitus with hyperglycemia, without long-term current use of insulin (Primary)  -     Comprehensive Metabolic Panel  -     Hemoglobin A1c; Future  -     Lipid panel; Future    2. Upper abdominal pain  -     US Abdomen Complete; Future    3. Chronic obstructive pulmonary disease, unspecified COPD type  -     albuterol sulfate  (90 Base) MCG/ACT inhaler; Inhale 2 puffs 4 (Four) Times a Day.  Dispense: 18 g; Refill: 5  -     levocetirizine (XYZAL) 5 MG tablet; Take 1 tablet by mouth Every " Evening.  Dispense: 90 tablet; Refill: 3    4. Gastroesophageal reflux disease, unspecified whether esophagitis present  -     omeprazole (priLOSEC) 20 MG capsule; Take 1 capsule by mouth Daily.  Dispense: 90 capsule; Refill: 3       Assessment & Plan  1. Diabetes Mellitus.  Her A1c level has shown a significant decrease from 11.7 to 8.8 over the past 4 months. Blood work will be conducted today to monitor her A1c levels. If the A1c level is still high, adjustments to her medication regimen will be considered.    2. Abdominal Bloating.  She reports experiencing abdominal bloating and swelling, particularly on the right side, which sometimes causes pain. An ultrasound of the gallbladder and liver will be scheduled to investigate the cause of her symptoms.    3. Ear Infection.  She reports redness and warmth in her ears but no earache. If symptoms persist or worsen, further evaluation will be necessary.    4. Medication Management.  Refills for her inhaler, stomach medication, and Xyzal have been provided. She does not need a refill for doxycycline at this time.      Follow Up:   Return in about 3 months (around 4/28/2025) for Recheck DM.    Patient or patient representative verbalized consent for the use of Ambient Listening during the visit with  Rocío Bay PA-C for chart documentation. 1/28/2025  09:18 EST    Rocío Bay PA-C   [facility] [place]

## 2025-02-17 ENCOUNTER — TELEPHONE (OUTPATIENT)
Dept: CARDIOLOGY | Facility: CLINIC | Age: 40
End: 2025-02-17
Payer: COMMERCIAL

## 2025-02-17 ENCOUNTER — PATIENT MESSAGE (OUTPATIENT)
Dept: FAMILY MEDICINE CLINIC | Facility: CLINIC | Age: 40
End: 2025-02-17
Payer: COMMERCIAL

## 2025-02-17 RX ORDER — BUMETANIDE 1 MG/1
1 TABLET ORAL DAILY
Qty: 90 TABLET | Refills: 3 | Status: SHIPPED | OUTPATIENT
Start: 2025-02-17

## 2025-02-17 RX ORDER — DIGOXIN 125 MCG
125 TABLET ORAL DAILY
Qty: 90 TABLET | Refills: 3 | Status: SHIPPED | OUTPATIENT
Start: 2025-02-17

## 2025-02-17 RX ORDER — DIGOXIN 125 MCG
125 TABLET ORAL DAILY
Qty: 90 TABLET | Refills: 3 | OUTPATIENT
Start: 2025-02-17

## 2025-02-17 NOTE — TELEPHONE ENCOUNTER
Lab Results   Component Value Date    GLUCOSE 119 (H) 01/28/2025    BUN 14 01/28/2025    CREATININE 0.76 01/28/2025     (L) 01/28/2025    K 4.1 01/28/2025    CL 96 (L) 01/28/2025    CALCIUM 9.5 01/28/2025    PROTEINTOT 7.7 01/28/2025    ALBUMIN 3.3 (L) 01/28/2025    ALT 72 (H) 01/28/2025    AST 93 (H) 01/28/2025    ALKPHOS 145 (H) 01/28/2025    BILITOT 0.4 01/28/2025    GLOB 4.4 01/28/2025    AGRATIO 0.8 01/28/2025    BCR 18.4 01/28/2025    ANIONGAP 10.9 01/28/2025    EGFR 101.7 01/28/2025

## 2025-04-29 ENCOUNTER — OFFICE VISIT (OUTPATIENT)
Dept: FAMILY MEDICINE CLINIC | Facility: CLINIC | Age: 40
End: 2025-04-29
Payer: COMMERCIAL

## 2025-04-29 VITALS
HEART RATE: 74 BPM | BODY MASS INDEX: 30.22 KG/M2 | OXYGEN SATURATION: 98 % | SYSTOLIC BLOOD PRESSURE: 112 MMHG | DIASTOLIC BLOOD PRESSURE: 72 MMHG | TEMPERATURE: 97.7 F | WEIGHT: 188 LBS | HEIGHT: 66 IN

## 2025-04-29 DIAGNOSIS — N76.0 ACUTE VAGINITIS: ICD-10-CM

## 2025-04-29 DIAGNOSIS — E11.65 TYPE 2 DIABETES MELLITUS WITH HYPERGLYCEMIA, WITHOUT LONG-TERM CURRENT USE OF INSULIN: Primary | ICD-10-CM

## 2025-04-29 LAB
EXPIRATION DATE: ABNORMAL
EXPIRATION DATE: ABNORMAL
HBA1C MFR BLD: 12.9 % (ref 4.5–5.7)
Lab: ABNORMAL
Lab: ABNORMAL
POC ALBUMIN, URINE: 30 MG/L
POC CREATININE, URINE: 10 MG/DL
POC URINE ALB/CREA RATIO: ABNORMAL

## 2025-04-29 PROCEDURE — 82570 ASSAY OF URINE CREATININE: CPT | Performed by: PHYSICIAN ASSISTANT

## 2025-04-29 PROCEDURE — 99214 OFFICE O/P EST MOD 30 MIN: CPT | Performed by: PHYSICIAN ASSISTANT

## 2025-04-29 PROCEDURE — 82044 UR ALBUMIN SEMIQUANTITATIVE: CPT | Performed by: PHYSICIAN ASSISTANT

## 2025-04-29 RX ORDER — FLUCONAZOLE 150 MG/1
150 TABLET ORAL ONCE
Qty: 1 TABLET | Refills: 0 | Status: SHIPPED | OUTPATIENT
Start: 2025-04-29 | End: 2025-04-29

## 2025-04-29 NOTE — PROGRESS NOTES
Follow Up Office Visit    Date: 2025   Patient Name: Herminia Hernandez  : 1985   MRN: [id number]     Chief Complaint:    Chief Complaint   Patient presents with    Diabetes    Heartburn       History of Present Illness:   Herminia Hernandez is a 39 y.o. female.  History of Present Illness  The patient presents for evaluation of diabetes mellitus.    She reports no current issues with her blood glucose levels but mentions discontinuing Ozempic approximately 3 months ago due to nausea and vomiting. Currently, she is on a regimen of metformin, taking one 500mg dose in the morning and another at night, along with Entresto. An attempt to increase the metformin dosage resulted in adverse effects.  Excessive thirst is reported, which she attributes to her high blood glucose levels.    She has some vaginal itching and yeast infection.    Foot swelling was experienced recently, which was alleviated by elevating her feet. The swelling was attributed to prolonged standing.        Subjective    Review of systems:  Review of Systems     I have reviewed and the following portions of the patient's history were updated as appropriate: past family history, past medical history, past social history, past surgical history and problem list.    Medications:     Current Outpatient Medications:     albuterol sulfate  (90 Base) MCG/ACT inhaler, Inhale 2 puffs 4 (Four) Times a Day., Disp: 18 g, Rfl: 5    Blood Glucose Monitoring Suppl (OneTouch Verio Flex System) w/Device kit, USE TWICE DAILY TO CHECK BLOOD SUGAR, Disp: , Rfl:     bumetanide (BUMEX) 1 MG tablet, Take 1 tablet by mouth Daily., Disp: 90 tablet, Rfl: 3    Continuous Glucose  (FreeStyle Filippo 3 Ursa) device, Use 1 Units Daily. E11.65, Disp: 1 each, Rfl: 0    Continuous Glucose Sensor (FreeStyle Filippo 3 Sensor) misc, Use 1 Units Every 14 (Fourteen) Days. E11.65, Disp: 2 each, Rfl: 11    digoxin (LANOXIN) 125 MCG tablet, Take 1 tablet by mouth  "Daily., Disp: 90 tablet, Rfl: 3    glucose blood test strip, Use twice daily to check blood sugar  E11.65, Disp: 60 each, Rfl: 12    glucose monitor monitoring kit, Use twice daily to check blood sugar  E11.65, Disp: 1 each, Rfl: 0    Lancets (freestyle) lancets, Use twice daily to check blood sugar E11.65, Disp: 60 each, Rfl: 12    metFORMIN (GLUCOPHAGE) 500 MG tablet, 1 tablet BID for 2 weeks, then 2 tablets BID (Patient taking differently: 1 tablet in the morning and one at night), Disp: 120 tablet, Rfl: 5    metoprolol succinate XL (TOPROL-XL) 50 MG 24 hr tablet, Take 1 tablet by mouth Daily., Disp: 30 tablet, Rfl: 11    omeprazole (priLOSEC) 20 MG capsule, Take 1 capsule by mouth Daily., Disp: 90 capsule, Rfl: 3    sacubitril-valsartan (ENTRESTO) 24-26 MG tablet, Take 1 tablet by mouth Every 12 (Twelve) Hours., Disp: 180 tablet, Rfl: 3    empagliflozin (Jardiance) 25 MG tablet tablet, Take 1 tablet by mouth Daily., Disp: 30 tablet, Rfl: 5    fluconazole (Diflucan) 150 MG tablet, Take 1 tablet by mouth 1 (One) Time for 1 dose., Disp: 1 tablet, Rfl: 0    Allergies:   Allergies   Allergen Reactions    Amoxicillin Swelling       Objective   Vital Signs:   Vitals:    04/29/25 1007   BP: 112/72   Pulse: 74   Temp: 97.7 °F (36.5 °C)   TempSrc: Infrared   SpO2: 98%   Weight: 85.3 kg (188 lb)   Height: 167.6 cm (65.98\")   PainSc: 0-No pain     Body mass index is 30.36 kg/m².          Physical Exam:   Physical Exam  Vitals and nursing note reviewed.   Constitutional:       Appearance: Normal appearance.   HENT:      Head: Normocephalic and atraumatic.      Right Ear: Tympanic membrane and ear canal normal.      Left Ear: Tympanic membrane and ear canal normal.      Nose: No congestion or rhinorrhea.      Mouth/Throat:      Mouth: Mucous membranes are moist.      Pharynx: Oropharynx is clear. No posterior oropharyngeal erythema.   Cardiovascular:      Rate and Rhythm: Normal rate and regular rhythm.   Pulmonary:      " Effort: Pulmonary effort is normal.      Breath sounds: Normal breath sounds. No decreased breath sounds, wheezing, rhonchi or rales.   Musculoskeletal:      Cervical back: Neck supple.      Right lower leg: No edema.      Left lower leg: No edema.   Lymphadenopathy:      Cervical: No cervical adenopathy.   Neurological:      Mental Status: She is alert.          Procedures     Assessment / Plan    Assessment/Plan:   Diagnoses and all orders for this visit:    1. Type 2 diabetes mellitus with hyperglycemia, without long-term current use of insulin (Primary)  -     POC Glycosylated Hemoglobin (Hb A1C)  -     POC Albumin/Creatinine Ratio Urine  -     empagliflozin (Jardiance) 25 MG tablet tablet; Take 1 tablet by mouth Daily.  Dispense: 30 tablet; Refill: 5    2. Acute vaginitis  -     fluconazole (Diflucan) 150 MG tablet; Take 1 tablet by mouth 1 (One) Time for 1 dose.  Dispense: 1 tablet; Refill: 0       Assessment & Plan  1. Diabetes mellitus.  - Blood glucose levels have significantly increased from 7.8 to 12, likely due to discontinuation of Ozempic.  - Currently on metformin 500 mg twice daily and Jardiance 25 mg daily; metformin dosage will remain unchanged.  - Advised to monitor for any side effects such as yeast infections; Diflucan prescribed to manage potential yeast infections today.  - Blood glucose levels will be reassessed in 3 months; instructed to report any adverse effects from the new medication immediately.      Follow Up:   Return in about 3 months (around 7/29/2025) for Recheck DM.    Patient or patient representative verbalized consent for the use of Ambient Listening during the visit with  [place] PA-C for chart documentation. 4/29/2025  10:42 EDT    [place] PA-C   [facility] [place]

## 2025-06-05 ENCOUNTER — READMISSION MANAGEMENT (OUTPATIENT)
Dept: CALL CENTER | Facility: HOSPITAL | Age: 40
End: 2025-06-05
Payer: COMMERCIAL

## 2025-06-05 NOTE — OUTREACH NOTE
Prep Survey      Flowsheet Row Responses   [facility]? Non-[facility]   Is LACE score < 7 ? Non- Discharge   Eligibility T[facility]   Date of Admission 06/01/25   Date of Discharge 06/05/25   Discharge Disposition Home or Self Care   Discharge diagnosis Pneumonia (Primary Dx),  Sepsis   Does the patient have one of the following disease processes/diagnoses(primary or secondary)? Sepsis   Prep survey completed? Yes            DAYANARA MONTILLA - Registered Nurse

## 2025-06-06 ENCOUNTER — TRANSITIONAL CARE MANAGEMENT TELEPHONE ENCOUNTER (OUTPATIENT)
Dept: CALL CENTER | Facility: HOSPITAL | Age: 40
End: 2025-06-06
Payer: COMMERCIAL

## 2025-06-06 NOTE — OUTREACH NOTE
Call Center TCM Note      Flowsheet Row Responses   [facility] facility patient discharged from? Non-BH   Does the patient have one of the following disease processes/diagnoses(primary or secondary)? Sepsis   TCM attempt successful? No  [No VR for PCP]   Unsuccessful attempts Attempt 1  [Non working #, EC unaware of pt's new number]   Call Status Voice mail issues            Maria Luisa Cunningham Registered Nurse    6/6/2025, 15:58 EDT

## 2025-06-06 NOTE — OUTREACH NOTE
Call Center TCM Note      Flowsheet Row Responses   [facility] facility patient discharged from? Non-BH   Does the patient have one of the following disease processes/diagnoses(primary or secondary)? Sepsis   TCM attempt successful? No   Unsuccessful attempts Attempt 2  [No VR for PCP. Attempted EC for pt's #, all numbers not working /x Mother's.Mother states that she does not know pt's new # to verify.]   Call Status Voice mail issues            Maria Luisa Cunningham Registered Nurse    6/6/2025, 16:21 EDT

## 2025-06-07 ENCOUNTER — TRANSITIONAL CARE MANAGEMENT TELEPHONE ENCOUNTER (OUTPATIENT)
Dept: CALL CENTER | Facility: HOSPITAL | Age: 40
End: 2025-06-07
Payer: COMMERCIAL

## 2025-06-07 NOTE — OUTREACH NOTE
Call Center TCM Note      Flowsheet Row Responses   [facility] patient discharged from? Non-  [[facility]]   Does the patient have one of the following disease processes/diagnoses(primary or secondary)? Other   TCM attempt successful? No   Unsuccessful attempts Attempt 3  [Phone number not in service.]   Revoked Reason Phone Issues   Comments PCP HOSPITAL f/u appt on 6/11/25 at 10:30 AM with YURI Cortez.   Does the patient have an appointment with their PCP within 7-14 days of discharge? Yes            Herlinda LEAL - Registered Nurse    6/7/2025, 15:00 EDT            Herlinda LEAL - Registered Nurse

## 2025-06-24 ENCOUNTER — TELEPHONE (OUTPATIENT)
Dept: FAMILY MEDICINE CLINIC | Facility: CLINIC | Age: 40
End: 2025-06-24
Payer: COMMERCIAL

## 2025-06-24 DIAGNOSIS — E11.65 TYPE 2 DIABETES MELLITUS WITH HYPERGLYCEMIA, WITHOUT LONG-TERM CURRENT USE OF INSULIN: ICD-10-CM

## 2025-06-24 RX ORDER — FLUCONAZOLE 150 MG/1
150 TABLET ORAL ONCE
Qty: 1 TABLET | Refills: 0 | Status: SHIPPED | OUTPATIENT
Start: 2025-06-24 | End: 2025-06-24

## 2025-06-24 NOTE — TELEPHONE ENCOUNTER
Patient called to schedule an appointment, she requested an antibiotic for a yeast infection, said it was a one pill prescription. She also mentioned she needed more glucose strips, please advise thank you

## 2025-06-30 ENCOUNTER — OFFICE VISIT (OUTPATIENT)
Dept: FAMILY MEDICINE CLINIC | Facility: CLINIC | Age: 40
End: 2025-06-30
Payer: COMMERCIAL

## 2025-06-30 ENCOUNTER — LAB (OUTPATIENT)
Dept: LAB | Facility: HOSPITAL | Age: 40
End: 2025-06-30
Payer: COMMERCIAL

## 2025-06-30 VITALS
TEMPERATURE: 98.2 F | HEART RATE: 85 BPM | BODY MASS INDEX: 28.54 KG/M2 | WEIGHT: 177.6 LBS | DIASTOLIC BLOOD PRESSURE: 58 MMHG | OXYGEN SATURATION: 100 % | HEIGHT: 66 IN | SYSTOLIC BLOOD PRESSURE: 94 MMHG | RESPIRATION RATE: 20 BRPM

## 2025-06-30 DIAGNOSIS — Z09 HOSPITAL DISCHARGE FOLLOW-UP: Primary | ICD-10-CM

## 2025-06-30 DIAGNOSIS — R79.9 ABNORMAL BLOOD CHEMISTRY: ICD-10-CM

## 2025-06-30 DIAGNOSIS — E11.65 TYPE 2 DIABETES MELLITUS WITH HYPERGLYCEMIA, WITHOUT LONG-TERM CURRENT USE OF INSULIN: ICD-10-CM

## 2025-06-30 DIAGNOSIS — D72.829 LEUKOCYTOSIS, UNSPECIFIED TYPE: ICD-10-CM

## 2025-06-30 DIAGNOSIS — Z87.01 HISTORY OF VIRAL PNEUMONIA: ICD-10-CM

## 2025-06-30 DIAGNOSIS — K08.89 PAIN, DENTAL: ICD-10-CM

## 2025-06-30 LAB
ALBUMIN SERPL-MCNC: 3.7 G/DL (ref 3.5–5.2)
ALBUMIN/GLOB SERPL: 0.9 G/DL
ALP SERPL-CCNC: 152 U/L (ref 39–117)
ALT SERPL W P-5'-P-CCNC: 77 U/L (ref 1–33)
ANION GAP SERPL CALCULATED.3IONS-SCNC: 15 MMOL/L (ref 5–15)
AST SERPL-CCNC: 100 U/L (ref 1–32)
BASOPHILS # BLD MANUAL: 0.17 10*3/MM3 (ref 0–0.2)
BASOPHILS NFR BLD MANUAL: 1 % (ref 0–1.5)
BILIRUB SERPL-MCNC: 0.2 MG/DL (ref 0–1.2)
BUN SERPL-MCNC: 25 MG/DL (ref 6–20)
BUN/CREAT SERPL: 9.7 (ref 7–25)
CALCIUM SPEC-SCNC: 9.1 MG/DL (ref 8.6–10.5)
CHLORIDE SERPL-SCNC: 97 MMOL/L (ref 98–107)
CO2 SERPL-SCNC: 23 MMOL/L (ref 22–29)
CREAT SERPL-MCNC: 2.59 MG/DL (ref 0.57–1)
DEPRECATED RDW RBC AUTO: 43 FL (ref 37–54)
EGFRCR SERPLBLD CKD-EPI 2021: 23.4 ML/MIN/1.73
EOSINOPHIL # BLD MANUAL: 1.7 10*3/MM3 (ref 0–0.4)
EOSINOPHIL NFR BLD MANUAL: 10.1 % (ref 0.3–6.2)
ERYTHROCYTE [DISTWIDTH] IN BLOOD BY AUTOMATED COUNT: 12.7 % (ref 12.3–15.4)
GLOBULIN UR ELPH-MCNC: 4.2 GM/DL
GLUCOSE SERPL-MCNC: 236 MG/DL (ref 65–99)
HCT VFR BLD AUTO: 40.1 % (ref 34–46.6)
HGB BLD-MCNC: 13.6 G/DL (ref 12–15.9)
LYMPHOCYTES # BLD MANUAL: 4.94 10*3/MM3 (ref 0.7–3.1)
LYMPHOCYTES NFR BLD MANUAL: 5.1 % (ref 5–12)
MCH RBC QN AUTO: 32.2 PG (ref 26.6–33)
MCHC RBC AUTO-ENTMCNC: 33.9 G/DL (ref 31.5–35.7)
MCV RBC AUTO: 95 FL (ref 79–97)
MONOCYTES # BLD: 0.86 10*3/MM3 (ref 0.1–0.9)
NEUTROPHILS # BLD AUTO: 9.19 10*3/MM3 (ref 1.7–7)
NEUTROPHILS NFR BLD MANUAL: 54.5 % (ref 42.7–76)
PLAT MORPH BLD: NORMAL
PLATELET # BLD AUTO: 495 10*3/MM3 (ref 140–450)
PMV BLD AUTO: 10.2 FL (ref 6–12)
POTASSIUM SERPL-SCNC: 3.9 MMOL/L (ref 3.5–5.2)
PROT SERPL-MCNC: 7.9 G/DL (ref 6–8.5)
RBC # BLD AUTO: 4.22 10*6/MM3 (ref 3.77–5.28)
RBC MORPH BLD: NORMAL
SODIUM SERPL-SCNC: 135 MMOL/L (ref 136–145)
VARIANT LYMPHS NFR BLD MANUAL: 29.3 % (ref 19.6–45.3)
WBC MORPH BLD: NORMAL
WBC NRBC COR # BLD AUTO: 16.87 10*3/MM3 (ref 3.4–10.8)

## 2025-06-30 PROCEDURE — 80053 COMPREHEN METABOLIC PANEL: CPT | Performed by: PHYSICIAN ASSISTANT

## 2025-06-30 PROCEDURE — 85007 BL SMEAR W/DIFF WBC COUNT: CPT | Performed by: PHYSICIAN ASSISTANT

## 2025-06-30 PROCEDURE — 85025 COMPLETE CBC W/AUTO DIFF WBC: CPT | Performed by: PHYSICIAN ASSISTANT

## 2025-06-30 NOTE — PATIENT INSTRUCTIONS
Diabetes Mellitus and Nutrition, Adult  When you have diabetes, or diabetes mellitus, it is very important to have healthy eating habits because your blood sugar (glucose) levels are greatly affected by what you eat and drink. Eating healthy foods in the right amounts, at about the same times every day, can help you:  Manage your blood glucose.  Lower your risk of heart disease.  Improve your blood pressure.  Reach or maintain a healthy weight.  What can affect my meal plan?  Every person with diabetes is different, and each person has different needs for a meal plan. Your health care provider may recommend that you work with a dietitian to make a meal plan that is best for you. Your meal plan may vary depending on factors such as:  The calories you need.  The medicines you take.  Your weight.  Your blood glucose, blood pressure, and cholesterol levels.  Your activity level.  Other health conditions you have, such as heart or kidney disease.  How do carbohydrates affect me?  Carbohydrates, also called carbs, affect your blood glucose level more than any other type of food. Eating carbs raises the amount of glucose in your blood.  It is important to know how many carbs you can safely have in each meal. This is different for every person. Your dietitian can help you calculate how many carbs you should have at each meal and for each snack.  How does alcohol affect me?  Alcohol can cause a decrease in blood glucose (hypoglycemia), especially if you use insulin or take certain diabetes medicines by mouth. Hypoglycemia can be a life-threatening condition. Symptoms of hypoglycemia, such as sleepiness, dizziness, and confusion, are similar to symptoms of having too much alcohol.  Do not drink alcohol if:  Your health care provider tells you not to drink.  You are pregnant, may be pregnant, or are planning to become pregnant.  If you drink alcohol:  Limit how much you have to:  0-1 drink a day for women.  0-2 drinks a day  "for men.  Know how much alcohol is in your drink. In the [place] one drink equals one 12 oz bottle of beer (355 mL), one 5 oz glass of wine (148 mL), or one 1½ oz glass of hard liquor (44 mL).  Keep yourself hydrated with water, diet soda, or unsweetened iced tea. Keep in mind that regular soda, juice, and other mixers may contain a lot of sugar and must be counted as carbs.  What are tips for following this plan?    Reading food labels  Start by checking the serving size on the Nutrition Facts label of packaged foods and drinks. The number of calories and the amount of carbs, fats, and other nutrients listed on the label are based on one serving of the item. Many items contain more than one serving per package.  Check the total grams (g) of carbs in one serving.  Check the number of grams of saturated fats and trans fats in one serving. Choose foods that have a low amount or none of these fats.  Check the number of milligrams (mg) of salt (sodium) in one serving. Most people should limit total sodium intake to less than 2,300 mg per day.  Always check the nutrition information of foods labeled as \"low-fat\" or \"nonfat.\" These foods may be higher in added sugar or refined carbs and should be avoided.  Talk to your dietitian to identify your daily goals for nutrients listed on the label.  Shopping  Avoid buying canned, pre-made, or processed foods. These foods tend to be high in fat, sodium, and added sugar.  Shop around the outside edge of the grocery store. This is where you will most often find fresh fruits and vegetables, bulk grains, fresh meats, and fresh dairy products.  Cooking  Use low-heat cooking methods, such as baking, instead of high-heat cooking methods, such as deep frying.  Cook using healthy oils, such as olive, canola, or sunflower oil.  Avoid cooking with butter, cream, or high-fat meats.  Meal planning  Eat meals and snacks regularly, preferably at the same times every day. Avoid going long periods " of time without eating.  Eat foods that are high in fiber, such as fresh fruits, vegetables, beans, and whole grains.  Eat 4-6 oz (112-168 g) of lean protein each day, such as lean meat, chicken, fish, eggs, or tofu. One ounce (oz) (28 g) of lean protein is equal to:  1 oz (28 g) of meat, chicken, or fish.  1 egg.  ¼ cup (62 g) of tofu.  Eat some foods each day that contain healthy fats, such as avocado, nuts, seeds, and fish.  What foods should I eat?  Fruits  Berries. Apples. Oranges. Peaches. Apricots. Plums. Grapes. Mangoes. Papayas. Pomegranates. Kiwi. Cherries.  Vegetables  Leafy greens, including lettuce, spinach, kale, chard, yahaira greens, mustard greens, and cabbage. Beets. Cauliflower. Broccoli. Carrots. Green beans. Tomatoes. Peppers. Onions. Cucumbers. [place] sprouts.  Grains  Whole grains, such as whole-wheat or whole-grain bread, crackers, tortillas, cereal, and pasta. Unsweetened oatmeal. Quinoa. Brown or wild rice.  Meats and other proteins  Seafood. Poultry without skin. Lean cuts of poultry and beef. Tofu. Nuts. Seeds.  Dairy  Low-fat or fat-free dairy products such as milk, yogurt, and cheese.  The items listed above may not be a complete list of foods and beverages you can eat and drink. Contact a dietitian for more information.  What foods should I avoid?  Fruits  Fruits canned with syrup.  Vegetables  Canned vegetables. Frozen vegetables with butter or cream sauce.  Grains  Refined white flour and flour products such as bread, pasta, snack foods, and cereals. Avoid all processed foods.  Meats and other proteins  Fatty cuts of meat. Poultry with skin. Breaded or fried meats. Processed meat. Avoid saturated fats.  Dairy  Full-fat yogurt, cheese, or milk.  Beverages  Sweetened drinks, such as soda or iced tea.  The items listed above may not be a complete list of foods and beverages you should avoid. Contact a dietitian for more information.  Questions to ask a health care provider  Do I need  to meet with a certified diabetes care and ?  Do I need to meet with a dietitian?  What number can I call if I have questions?  When are the best times to check my blood glucose?  Where to find more information:  American Diabetes Association: diabetes.org  Academy of Nutrition and Dietetics: eatright.org  National [place] of Diabetes and Digestive and Kidney Diseases: niddk.nih.gov  Association of Diabetes Care & Education Specialists: diabeteseducator.org  Summary  It is important to have healthy eating habits because your blood sugar (glucose) levels are greatly affected by what you eat and drink. It is important to use alcohol carefully.  A healthy meal plan will help you manage your blood glucose and lower your risk of heart disease.  Your health care provider may recommend that you work with a dietitian to make a meal plan that is best for you.  This information is not intended to replace advice given to you by your health care provider. Make sure you discuss any questions you have with your health care provider.  Document Revised: 07/21/2021 Document Reviewed: 07/21/2021  Elsevier Patient Education © 2024 Elsevier Inc.

## 2025-06-30 NOTE — PROGRESS NOTES
Transitional Care Follow Up Visit  Subjective     April Sophia Hernandez is a 39 y.o. female who presents for a transitional care management visit.    Within 48 business hours after discharge our office contacted her via telephone to coordinate her care and needs.      I reviewed and discussed the details of that call along with the discharge summary, hospital problems, inpatient lab results, inpatient diagnostic studies, and consultation reports with April.     Current outpatient and discharge medications have been reconciled for the patient.  Reviewed by: Sheridan Locke PA-C          6/5/2025     7:45 PM   Date of TCM Phone Call   [facility]   Date of Admission 6/1/2025   Date of Discharge 6/5/2025   Discharge Disposition Home or Self Care     Risk for Readmission (LACE) No data recorded    History of Present Illness   Course During Hospital Stay: 39-year-old female with past medical history of COPD, type 2 diabetes, HFpEF, atrial fibrillation presented to the ED with complaints of shortness of breath and nonproductive cough x 1 week.  She was seen in the ED on 528 with similar complaints, though left before she was prescribed medications.  Lab workup in emergency department revealed elevated WBC 14.1 with left shift, lactic acid 3.0, potassium 3.2, magnesium 1.8, glucose 315, , ALT 78, and total bili 0.5    Patient was admitted with pneumonia and acute exacerbation of COPD.  Systemic inflammatory response syndrome present on admission.  Patient received IV antibiotics.  Received IV ceftriaxone and doxycycline along with steroids.  She received a full course of antibiotics and steroids.  She had recent chest x-ray done on 5/29/2025 showed bilateral basilar infiltrates, however further chest x-ray done on 6/1/2025 was unremarkable.  Patient did not require antibiotics at discharge given clear chest x-ray and viral pneumonia.    She was discharged on oral prednisone and albuterol inhaler.   "Hypokalemia was replaced and resolved, hypomagnesemia was replaced and resolved.    Patient states since her discharge she has been feeling well.  Denies any ongoing cough, shortness of breath, chest pain, dizziness, lightheadedness, palpitations, lower extremity edema.    Throughout her stay in the hospital, she was given insulin.  She has been monitoring her blood sugar at home and states it has been ranging between 100-200 with occasional glucose in the 300s.  She states she has not been taking Jardiance, stating she has not been receiving this from the pharmacy.  Her current regimen includes metformin 500 mg twice daily.  States she cannot tolerate increased dose of 2000 mg daily.    Patient also reporting some dental pain due to dental fracture and is requesting referral to dentist.     The following portions of the patient's history were reviewed and updated as appropriate: allergies, current medications, past family history, past medical history, past social history, past surgical history, and problem list.    Review of Systems    Objective   BP 94/58   Pulse 85   Temp 98.2 °F (36.8 °C) (Temporal)   Resp 20   Ht 167.6 cm (65.98\")   Wt 80.6 kg (177 lb 9.6 oz)   SpO2 100%   BMI 28.68 kg/m²   Physical Exam  Constitutional:       General: She is not in acute distress.     Appearance: She is not ill-appearing.   HENT:      Head: Normocephalic.      Right Ear: Tympanic membrane and ear canal normal.      Left Ear: Tympanic membrane and ear canal normal.      Nose: Nose normal.      Mouth/Throat:      Mouth: Mucous membranes are moist.      Dentition: Abnormal dentition.      Pharynx: No oropharyngeal exudate or posterior oropharyngeal erythema.   Eyes:      Pupils: Pupils are equal, round, and reactive to light.   Cardiovascular:      Rate and Rhythm: Normal rate and regular rhythm.      Heart sounds: No murmur heard.  Pulmonary:      Effort: Pulmonary effort is normal. No respiratory distress.      Breath " sounds: No wheezing, rhonchi or rales.   Musculoskeletal:         General: Normal range of motion.   Skin:     General: Skin is warm.   Neurological:      General: No focal deficit present.      Mental Status: She is alert.   Psychiatric:         Mood and Affect: Mood normal.         Assessment & Plan   Diagnoses and all orders for this visit:    1. Hospital discharge follow-up (Primary)    2. History of viral pneumonia  Assessment & Plan:  Symptoms resolved.  No ongoing shortness of breath, cough, or systemic symptoms.      3. Type 2 diabetes mellitus with hyperglycemia, without long-term current use of insulin  Assessment & Plan:  Continue metformin 500 mg twice daily  Restart Jardiance 25 mg daily  Samples provided today  Refill of One Touch test strips provided today.  Follow-up in 1 month to ensure sugars are improving    Orders:  -     glucose blood test strip; Use as instructed up to 3 times per day  Dispense: 100 each; Refill: 12  -     empagliflozin (Jardiance) 25 MG tablet tablet; Take 1 tablet by mouth Daily.  Dispense: 90 tablet; Refill: 3    4. Pain, dental  -     Ambulatory Referral to Dentistry    5. Leukocytosis, unspecified type  -     CBC & Differential; Future  -     CBC & Differential    6. Abnormal blood chemistry  -     Comprehensive Metabolic Panel; Future  -     Comprehensive Metabolic Panel           Follow Up:   Return for Next scheduled follow up.    Sheridan Locke PA-C    [facility] [place]

## 2025-06-30 NOTE — ASSESSMENT & PLAN NOTE
Continue metformin 500 mg twice daily  Restart Jardiance 25 mg daily  Samples provided today  Refill of One Touch test strips provided today.  Follow-up in 1 month to ensure sugars are improving

## 2025-07-02 ENCOUNTER — TELEPHONE (OUTPATIENT)
Dept: FAMILY MEDICINE CLINIC | Facility: CLINIC | Age: 40
End: 2025-07-02

## 2025-07-02 ENCOUNTER — TELEPHONE (OUTPATIENT)
Dept: FAMILY MEDICINE CLINIC | Facility: CLINIC | Age: 40
End: 2025-07-02
Payer: COMMERCIAL

## 2025-07-02 NOTE — TELEPHONE ENCOUNTER
Was able to reach patient's emergency contact and obtained updated mobile number which has been added to chart.  Attempted to contact patient to discuss need for office visit to discuss abnormal labs, though patient's voicemail box is not set up.

## 2025-07-03 ENCOUNTER — OFFICE VISIT (OUTPATIENT)
Dept: FAMILY MEDICINE CLINIC | Facility: CLINIC | Age: 40
End: 2025-07-03
Payer: COMMERCIAL

## 2025-07-03 ENCOUNTER — TELEPHONE (OUTPATIENT)
Dept: FAMILY MEDICINE CLINIC | Facility: CLINIC | Age: 40
End: 2025-07-03
Payer: COMMERCIAL

## 2025-07-03 ENCOUNTER — LAB (OUTPATIENT)
Dept: LAB | Facility: HOSPITAL | Age: 40
End: 2025-07-03
Payer: COMMERCIAL

## 2025-07-03 VITALS
HEART RATE: 107 BPM | HEIGHT: 66 IN | OXYGEN SATURATION: 98 % | BODY MASS INDEX: 29.41 KG/M2 | WEIGHT: 183 LBS | DIASTOLIC BLOOD PRESSURE: 64 MMHG | SYSTOLIC BLOOD PRESSURE: 102 MMHG | TEMPERATURE: 98.6 F

## 2025-07-03 DIAGNOSIS — I50.22 CHRONIC SYSTOLIC (CONGESTIVE) HEART FAILURE: ICD-10-CM

## 2025-07-03 DIAGNOSIS — N17.9 AKI (ACUTE KIDNEY INJURY): ICD-10-CM

## 2025-07-03 DIAGNOSIS — E11.65 TYPE 2 DIABETES MELLITUS WITH HYPERGLYCEMIA, WITHOUT LONG-TERM CURRENT USE OF INSULIN: ICD-10-CM

## 2025-07-03 DIAGNOSIS — F15.10 METHAMPHETAMINE USE: ICD-10-CM

## 2025-07-03 DIAGNOSIS — D72.829 LEUKOCYTOSIS, UNSPECIFIED TYPE: ICD-10-CM

## 2025-07-03 DIAGNOSIS — R74.8 ELEVATED LIVER ENZYMES: ICD-10-CM

## 2025-07-03 DIAGNOSIS — N17.9 AKI (ACUTE KIDNEY INJURY): Primary | ICD-10-CM

## 2025-07-03 LAB — D-LACTATE SERPL-SCNC: 2 MMOL/L (ref 0.5–2)

## 2025-07-03 PROCEDURE — 86160 COMPLEMENT ANTIGEN: CPT

## 2025-07-03 PROCEDURE — 82550 ASSAY OF CK (CPK): CPT

## 2025-07-03 PROCEDURE — 81001 URINALYSIS AUTO W/SCOPE: CPT

## 2025-07-03 PROCEDURE — 87522 HEPATITIS C REVRS TRNSCRPJ: CPT

## 2025-07-03 PROCEDURE — 84300 ASSAY OF URINE SODIUM: CPT

## 2025-07-03 PROCEDURE — 84156 ASSAY OF PROTEIN URINE: CPT

## 2025-07-03 PROCEDURE — 86803 HEPATITIS C AB TEST: CPT

## 2025-07-03 PROCEDURE — 82570 ASSAY OF URINE CREATININE: CPT

## 2025-07-03 PROCEDURE — 86038 ANTINUCLEAR ANTIBODIES: CPT

## 2025-07-03 PROCEDURE — 36415 COLL VENOUS BLD VENIPUNCTURE: CPT

## 2025-07-03 PROCEDURE — 84100 ASSAY OF PHOSPHORUS: CPT

## 2025-07-03 PROCEDURE — 85025 COMPLETE CBC W/AUTO DIFF WBC: CPT

## 2025-07-03 PROCEDURE — 83605 ASSAY OF LACTIC ACID: CPT

## 2025-07-03 PROCEDURE — 80053 COMPREHEN METABOLIC PANEL: CPT

## 2025-07-03 NOTE — ASSESSMENT & PLAN NOTE
Possible cause of JAYNE  Last used approximately 3 days ago  Stressed the importance of abstaining from illicit drugs

## 2025-07-03 NOTE — ASSESSMENT & PLAN NOTE
- Recent labs on 6/30/25 indicate JAYNE with increase of creatinine from 0.79 to 2.59, BUN increased from 20.9 to 25, and GFR decreased from 97 to 23.4.  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  - Will obtain repeat labs and urine studies today as listed below.  - Renal ultrasound ordered  - Advised to discontinue Entresto, Jardiance, and metformin temporarily  - Strict ER precautions given.

## 2025-07-03 NOTE — ASSESSMENT & PLAN NOTE
Discontinue Entresto temporarily due to JAYNE  May also need to DC Bumex pending labs today  Advise to monitor for symptoms of fluid overload

## 2025-07-03 NOTE — PROGRESS NOTES
Follow Up Office Visit      Patient Name: Herminia Hernandez  : 1985   MRN: [id number]     Chief Complaint:    Chief Complaint   Patient presents with    Abnormal Lab       History of Present Illness  39-year-old female presents today for follow-up regarding abnormal labs completed on 2025.    Patient presented on 2025 for a hospital follow-up.  At that time, patient was feeling well with no acute complaints.  Admission was secondary to viral pneumonia and patient reported complete resolution of symptoms without cough, shortness of breath, edema, fever, chills, body aches.  Repeat labs were obtained which were suggestive of acute kidney injury and ongoing leukocytosis.    Patient continues to remain asymptomatic.  She denies any chest pain, shortness of breath, palpitations, lower extremity edema, orthopnea, fever, chills, cough, nausea, vomiting.  She continues to produce urine normally.  She denies any increased fatigue.    She has abstained from alcohol and is in the process of quitting drug use, with her last use of ICE being 3 days ago. She used IV drugs 8 years ago and smoked crack for 4 years.    She has been monitoring her blood sugar levels at home, which were 215 this morning and 277 last night. She has not observed any readings in the 300s range.    Subjective      I have reviewed and the following portions of the patient's history were updated as appropriate: past family history, past medical history, past social history, past surgical history and problem list.    Medications:     Current Outpatient Medications:     albuterol sulfate  (90 Base) MCG/ACT inhaler, Inhale 2 puffs 4 (Four) Times a Day., Disp: 18 g, Rfl: 5    Blood Glucose Monitoring Suppl (OneTouch Verio Flex System) w/Device kit, USE TWICE DAILY TO CHECK BLOOD SUGAR, Disp: , Rfl:     bumetanide (BUMEX) 1 MG tablet, Take 1 tablet by mouth Daily., Disp: 90 tablet, Rfl: 3    Continuous Glucose  (FreeStyle  Filippo 3 [place]) device, Use 1 Units Daily. E11.65, Disp: 1 each, Rfl: 0    Continuous Glucose Sensor (FreeStyle Filippo 3 Sensor) misc, Use 1 Units Every 14 (Fourteen) Days. E11.65, Disp: 2 each, Rfl: 11    digoxin (LANOXIN) 125 MCG tablet, Take 1 tablet by mouth Daily., Disp: 90 tablet, Rfl: 3    glucose blood test strip, Use as instructed up to 3 times per day, Disp: 100 each, Rfl: 12    glucose monitor monitoring kit, Use twice daily to check blood sugar  E11.65, Disp: 1 each, Rfl: 0    Lancets (freestyle) lancets, Use twice daily to check blood sugar E11.65, Disp: 60 each, Rfl: 12    metoprolol succinate XL (TOPROL-XL) 50 MG 24 hr tablet, Take 1 tablet by mouth Daily., Disp: 30 tablet, Rfl: 11    omeprazole (priLOSEC) 20 MG capsule, Take 1 capsule by mouth Daily., Disp: 90 capsule, Rfl: 3    sacubitril-valsartan (ENTRESTO) 24-26 MG tablet, Take 1 tablet by mouth Every 12 (Twelve) Hours., Disp: 180 tablet, Rfl: 3    Insulin Glargine (LANTUS SOLOSTAR) 100 UNIT/ML injection pen, Inject 10 Units under the skin into the appropriate area as directed Daily., Disp: 15 mL, Rfl: 0    Allergies:   Allergies   Allergen Reactions    Amoxicillin Swelling       Objective     Physical Exam:   Physical Exam  Constitutional:       General: She is not in acute distress.     Appearance: She is not ill-appearing.   HENT:      Head: Normocephalic.   Cardiovascular:      Rate and Rhythm: Normal rate and regular rhythm.      Heart sounds: No murmur heard.  Pulmonary:      Effort: Pulmonary effort is normal. No respiratory distress.      Breath sounds: Normal breath sounds.   Musculoskeletal:         General: Normal range of motion.      Right lower leg: No edema.      Left lower leg: No edema.   Skin:     General: Skin is warm.   Neurological:      General: No focal deficit present.      Mental Status: She is alert.   Psychiatric:         Mood and Affect: Mood normal.         Vital Signs:   Vitals:    07/03/25 1339   BP: 102/64   Pulse:  "107   Temp: 98.6 °F (37 °C)   TempSrc: Temporal   SpO2: 98%   Weight: 83 kg (183 lb)   Height: 167.6 cm (65.98\")     Body mass index is 29.55 kg/m².    Procedures    Assessment / Plan         Assessment and Plan     Diagnoses and all orders for this visit:    1. JAYNE (acute kidney injury) (Primary)  Assessment & Plan:  - Recent labs on 6/30/25 indicate JAYNE with increase of creatinine from 0.79 to 2.59, BUN increased from 20.9 to 25, and GFR decreased from 97 to 23.4.  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  - Will obtain repeat labs and urine studies today as listed below.  - Renal ultrasound ordered  - Advised to discontinue Entresto, Jardiance, and metformin temporarily  - Strict ER precautions given.    Orders:  -     US Renal Bilateral; Future  -     Creatinine Urine Random (kidney function) GFR component - Urine, Clean Catch; Future  -     Protein / Creatinine Ratio, Urine - Urine, Clean Catch; Future  -     Sodium, Urine, Random - Urine, Clean Catch; Future  -     MORAIMA by IFA, Reflex to Titer and Pattern; Future  -     Comprehensive metabolic panel; Future  -     C4+C3; Future  -     Urinalysis With Microscopic - Urine, Clean Catch; Future  -     Phosphorus; Future  -     CK; Future  -     Lactic Acid, Plasma; Future    2. Leukocytosis, unspecified type  -     CBC & Differential; Future    3. Type 2 diabetes mellitus with hyperglycemia, without long-term current use of insulin  Assessment & Plan:  - Due to acute kidney injury will temporarily discontinue metformin and Jardiance.  - Patient to start Lantus 10 units daily  - Continue to monitor blood sugars closely  - Bring blood sugar log at follow-up in 5 days and will titrate insulin accordingly     Orders:  -     Insulin Glargine (LANTUS SOLOSTAR) 100 UNIT/ML injection " pen; Inject 10 Units under the skin into the appropriate area as directed Daily.  Dispense: 15 mL; Refill: 0  -     Lactic Acid, Plasma; Future    4. Elevated liver enzymes  -     HCV Antibody Rfx To Qnt PCR; Future    5. Methamphetamine use  Assessment & Plan:  Possible cause of JAYNE  Last used approximately 3 days ago  Stressed the importance of abstaining from illicit drugs      Orders:  -     CK; Future  -     Lactic Acid, Plasma; Future    6. Chronic systolic heart failure  Assessment & Plan:  Discontinue Entresto temporarily due to JAYNE  May also need to DC Bumex pending labs today  Advise to monitor for symptoms of fluid overload             Follow Up:   Return in about 6 days (around 7/9/2025) for JAYNE.    Patient or patient representative verbalized consent for the use of Ambient Listening during the visit with  Sheridan Locke PA-C for chart documentation. 7/3/2025  13:49 EDT    Sheridan Locke PA-C    [facility]

## 2025-07-03 NOTE — ASSESSMENT & PLAN NOTE
- Due to acute kidney injury will temporarily discontinue metformin and Jardiance.  - Patient to start Lantus 10 units daily  - Continue to monitor blood sugars closely  - Bring blood sugar log at follow-up in 5 days and will titrate insulin accordingly

## 2025-07-03 NOTE — TELEPHONE ENCOUNTER
PATIENT IS REQUESTING A TRANSFER OF CARE FROM       [facility]      NO REASON WAS GIVEN        IS THIS OK WITH BOTH OF YOU?

## 2025-07-04 LAB
ALBUMIN SERPL-MCNC: 3.8 G/DL (ref 3.5–5.2)
ALBUMIN/GLOB SERPL: 0.9 G/DL
ALP SERPL-CCNC: 146 U/L (ref 39–117)
ALT SERPL W P-5'-P-CCNC: 78 U/L (ref 1–33)
ANION GAP SERPL CALCULATED.3IONS-SCNC: 10.4 MMOL/L (ref 5–15)
AST SERPL-CCNC: 95 U/L (ref 1–32)
BACTERIA UR QL AUTO: NORMAL /HPF
BASOPHILS # BLD AUTO: 0.09 10*3/MM3 (ref 0–0.2)
BASOPHILS NFR BLD AUTO: 0.8 % (ref 0–1.5)
BILIRUB SERPL-MCNC: 0.3 MG/DL (ref 0–1.2)
BILIRUB UR QL STRIP: NEGATIVE
BUN SERPL-MCNC: 12 MG/DL (ref 6–20)
BUN/CREAT SERPL: 13.8 (ref 7–25)
C3 SERPL-MCNC: 142 MG/DL (ref 82–167)
C4 SERPL-MCNC: 34 MG/DL (ref 14–44)
CALCIUM SPEC-SCNC: 9.4 MG/DL (ref 8.6–10.5)
CHLORIDE SERPL-SCNC: 93 MMOL/L (ref 98–107)
CK SERPL-CCNC: 82 U/L (ref 20–180)
CLARITY UR: CLEAR
CO2 SERPL-SCNC: 29.6 MMOL/L (ref 22–29)
COLOR UR: YELLOW
CREAT SERPL-MCNC: 0.87 MG/DL (ref 0.57–1)
CREAT UR-MCNC: 15.6 MG/DL
DEPRECATED RDW RBC AUTO: 44.8 FL (ref 37–54)
EGFRCR SERPLBLD CKD-EPI 2021: 86.5 ML/MIN/1.73
EOSINOPHIL # BLD AUTO: 0.24 10*3/MM3 (ref 0–0.4)
EOSINOPHIL NFR BLD AUTO: 2.1 % (ref 0.3–6.2)
ERYTHROCYTE [DISTWIDTH] IN BLOOD BY AUTOMATED COUNT: 12.4 % (ref 12.3–15.4)
GLOBULIN UR ELPH-MCNC: 4.1 GM/DL
GLUCOSE SERPL-MCNC: 376 MG/DL (ref 65–99)
GLUCOSE UR STRIP-MCNC: ABNORMAL MG/DL
HCT VFR BLD AUTO: 39.3 % (ref 34–46.6)
HGB BLD-MCNC: 12.6 G/DL (ref 12–15.9)
HGB UR QL STRIP.AUTO: NEGATIVE
HYALINE CASTS UR QL AUTO: NORMAL /LPF
IMM GRANULOCYTES # BLD AUTO: 0.02 10*3/MM3 (ref 0–0.05)
IMM GRANULOCYTES NFR BLD AUTO: 0.2 % (ref 0–0.5)
KETONES UR QL STRIP: NEGATIVE
LEUKOCYTE ESTERASE UR QL STRIP.AUTO: NEGATIVE
LYMPHOCYTES # BLD AUTO: 4.19 10*3/MM3 (ref 0.7–3.1)
LYMPHOCYTES NFR BLD AUTO: 36.8 % (ref 19.6–45.3)
MCH RBC QN AUTO: 31.7 PG (ref 26.6–33)
MCHC RBC AUTO-ENTMCNC: 32.1 G/DL (ref 31.5–35.7)
MCV RBC AUTO: 98.7 FL (ref 79–97)
MONOCYTES # BLD AUTO: 0.94 10*3/MM3 (ref 0.1–0.9)
MONOCYTES NFR BLD AUTO: 8.3 % (ref 5–12)
NEUTROPHILS NFR BLD AUTO: 5.91 10*3/MM3 (ref 1.7–7)
NEUTROPHILS NFR BLD AUTO: 51.8 % (ref 42.7–76)
NITRITE UR QL STRIP: NEGATIVE
NRBC BLD AUTO-RTO: 0 /100 WBC (ref 0–0.2)
PH UR STRIP.AUTO: 6 [PH] (ref 5–8)
PHOSPHATE SERPL-MCNC: 3 MG/DL (ref 2.5–4.5)
PLATELET # BLD AUTO: 373 10*3/MM3 (ref 140–450)
PMV BLD AUTO: 10.9 FL (ref 6–12)
POTASSIUM SERPL-SCNC: 3.9 MMOL/L (ref 3.5–5.2)
PROT ?TM UR-MCNC: <4 MG/DL
PROT SERPL-MCNC: 7.9 G/DL (ref 6–8.5)
PROT UR QL STRIP: NEGATIVE
PROT/CREAT UR: NORMAL MG/G{CREAT}
RBC # BLD AUTO: 3.98 10*6/MM3 (ref 3.77–5.28)
RBC # UR STRIP: NORMAL /HPF
REF LAB TEST METHOD: NORMAL
SODIUM SERPL-SCNC: 133 MMOL/L (ref 136–145)
SODIUM UR-SCNC: 67 MMOL/L
SP GR UR STRIP: 1.02 (ref 1–1.03)
SQUAMOUS #/AREA URNS HPF: NORMAL /HPF
UROBILINOGEN UR QL STRIP: ABNORMAL
WBC # UR STRIP: NORMAL /HPF
WBC NRBC COR # BLD AUTO: 11.39 10*3/MM3 (ref 3.4–10.8)

## 2025-07-07 ENCOUNTER — TELEPHONE (OUTPATIENT)
Dept: FAMILY MEDICINE CLINIC | Facility: CLINIC | Age: 40
End: 2025-07-07
Payer: COMMERCIAL

## 2025-07-07 DIAGNOSIS — E11.65 TYPE 2 DIABETES MELLITUS WITH HYPERGLYCEMIA, WITHOUT LONG-TERM CURRENT USE OF INSULIN: Primary | ICD-10-CM

## 2025-07-07 NOTE — TELEPHONE ENCOUNTER
Caller: Herminia Hernandez    Relationship: Self    Best call back number: [phone]    Requested Prescriptions:   NEEDS PEN NEEDLES FOR INSULIN     Pharmacy where request should be sent: [id number] - [place], [place] - [address] - [phone]  - [phone] FX     Last office visit with prescribing clinician: 4/29/2025   Last telemedicine visit with prescribing clinician: Visit date not found   Next office visit with prescribing clinician: 7/29/2025     Additional details provided by patient: SHE NEVER RECEIVED A PRESCRIPTION FOR THE NEEDLES     Does the patient have less than a 3 day supply:  [x] Yes  [] No    Would you like a call back once the refill request has been completed: [] Yes [x] No    If the office needs to give you a call back, can they leave a voicemail: [] Yes [x] No    Valorie Cope Rep   07/07/25 13:17 EDT

## 2025-07-08 ENCOUNTER — TELEPHONE (OUTPATIENT)
Dept: FAMILY MEDICINE CLINIC | Facility: CLINIC | Age: 40
End: 2025-07-08
Payer: COMMERCIAL

## 2025-07-08 LAB — ANA SER QL IF: NEGATIVE

## 2025-07-08 NOTE — TELEPHONE ENCOUNTER
PA started but [employer] is unable to response.    [employer] is unable to respond with clinical questions. Please see more information at the bottom of the page for next steps.

## 2025-07-08 NOTE — TELEPHONE ENCOUNTER
Information regarding your request  Drug is nonmedicaid rebateable, please resubmit the PA with an alternative NDC    Please advise on new monitor and stripes that medicaid cover.

## 2025-07-09 ENCOUNTER — OFFICE VISIT (OUTPATIENT)
Dept: FAMILY MEDICINE CLINIC | Facility: CLINIC | Age: 40
End: 2025-07-09
Payer: COMMERCIAL

## 2025-07-09 ENCOUNTER — TELEPHONE (OUTPATIENT)
Dept: FAMILY MEDICINE CLINIC | Facility: CLINIC | Age: 40
End: 2025-07-09

## 2025-07-09 VITALS
TEMPERATURE: 98.9 F | HEIGHT: 66 IN | WEIGHT: 189 LBS | DIASTOLIC BLOOD PRESSURE: 70 MMHG | BODY MASS INDEX: 30.37 KG/M2 | HEART RATE: 82 BPM | SYSTOLIC BLOOD PRESSURE: 112 MMHG

## 2025-07-09 DIAGNOSIS — N17.9 AKI (ACUTE KIDNEY INJURY): Primary | ICD-10-CM

## 2025-07-09 DIAGNOSIS — I50.22 CHRONIC SYSTOLIC (CONGESTIVE) HEART FAILURE: ICD-10-CM

## 2025-07-09 DIAGNOSIS — F15.10 METHAMPHETAMINE USE: ICD-10-CM

## 2025-07-09 DIAGNOSIS — E11.65 TYPE 2 DIABETES MELLITUS WITH HYPERGLYCEMIA, WITHOUT LONG-TERM CURRENT USE OF INSULIN: ICD-10-CM

## 2025-07-09 LAB
DIAGNOSTIC IMP SPEC-IMP: NORMAL
GLUCOSE BLDC GLUCOMTR-MCNC: 418 MG/DL (ref 70–130)
HCV AB SERPL QL IA: REACTIVE
HCV RNA SERPL NAA+PROBE-ACNC: NORMAL IU/ML
HCV RNA SERPL NAA+PROBE-LOG IU: 6.88 LOG10 IU/ML
REF LAB TEST REF RANGE: NORMAL

## 2025-07-09 PROCEDURE — 1160F RVW MEDS BY RX/DR IN RCRD: CPT | Performed by: PHYSICIAN ASSISTANT

## 2025-07-09 PROCEDURE — 1126F AMNT PAIN NOTED NONE PRSNT: CPT | Performed by: PHYSICIAN ASSISTANT

## 2025-07-09 PROCEDURE — 1159F MED LIST DOCD IN RCRD: CPT | Performed by: PHYSICIAN ASSISTANT

## 2025-07-09 PROCEDURE — 3046F HEMOGLOBIN A1C LEVEL >9.0%: CPT | Performed by: PHYSICIAN ASSISTANT

## 2025-07-09 PROCEDURE — 82948 REAGENT STRIP/BLOOD GLUCOSE: CPT | Performed by: PHYSICIAN ASSISTANT

## 2025-07-09 PROCEDURE — 99214 OFFICE O/P EST MOD 30 MIN: CPT | Performed by: PHYSICIAN ASSISTANT

## 2025-07-09 RX ORDER — BLOOD-GLUCOSE METER
1 KIT MISCELLANEOUS ONCE
Qty: 1 EACH | Refills: 0 | Status: SHIPPED | OUTPATIENT
Start: 2025-07-09 | End: 2025-07-09

## 2025-07-09 NOTE — ASSESSMENT & PLAN NOTE
Entresto initially discontinued due to JAYNE, though patient advised to restart  Continue Bumex as needed.  Continue close follow-up with cardiology  Stressed the importance of abstaining from methamphetamines

## 2025-07-09 NOTE — ASSESSMENT & PLAN NOTE
To restart metformin and Jardiance  Discussed if blood sugar continues to remain above 200, recommend titrating Lantus by 2 units until blood sugars remain below 200 consistently.  Continue to monitor blood sugars closely.  Follow-up in 8 weeks

## 2025-07-09 NOTE — TELEPHONE ENCOUNTER
PA request received from pharmacy for [employer] Kit. Request indexed to chart

## 2025-07-09 NOTE — ASSESSMENT & PLAN NOTE
Repeat labs on 7/3/2025 revealed resolution of JAYNE  Likely secondary to methamphetamine use.  Okay to restart Entresto, metformin, and Jardiance.  Will follow-up in approximately 8 weeks for repeat labs

## 2025-07-09 NOTE — PATIENT INSTRUCTIONS
Diabetes Mellitus and Nutrition, Adult  When you have diabetes, or diabetes mellitus, it is very important to have healthy eating habits because your blood sugar (glucose) levels are greatly affected by what you eat and drink. Eating healthy foods in the right amounts, at about the same times every day, can help you:  Manage your blood glucose.  Lower your risk of heart disease.  Improve your blood pressure.  Reach or maintain a healthy weight.  What can affect my meal plan?  Every person with diabetes is different, and each person has different needs for a meal plan. Your health care provider may recommend that you work with a dietitian to make a meal plan that is best for you. Your meal plan may vary depending on factors such as:  The calories you need.  The medicines you take.  Your weight.  Your blood glucose, blood pressure, and cholesterol levels.  Your activity level.  Other health conditions you have, such as heart or kidney disease.  How do carbohydrates affect me?  Carbohydrates, also called carbs, affect your blood glucose level more than any other type of food. Eating carbs raises the amount of glucose in your blood.  It is important to know how many carbs you can safely have in each meal. This is different for every person. Your dietitian can help you calculate how many carbs you should have at each meal and for each snack.  How does alcohol affect me?  Alcohol can cause a decrease in blood glucose (hypoglycemia), especially if you use insulin or take certain diabetes medicines by mouth. Hypoglycemia can be a life-threatening condition. Symptoms of hypoglycemia, such as sleepiness, dizziness, and confusion, are similar to symptoms of having too much alcohol.  Do not drink alcohol if:  Your health care provider tells you not to drink.  You are pregnant, may be pregnant, or are planning to become pregnant.  If you drink alcohol:  Limit how much you have to:  0-1 drink a day for women.  0-2 drinks a day  "for men.  Know how much alcohol is in your drink. In the [place] one drink equals one 12 oz bottle of beer (355 mL), one 5 oz glass of wine (148 mL), or one 1½ oz glass of hard liquor (44 mL).  Keep yourself hydrated with water, diet soda, or unsweetened iced tea. Keep in mind that regular soda, juice, and other mixers may contain a lot of sugar and must be counted as carbs.  What are tips for following this plan?    Reading food labels  Start by checking the serving size on the Nutrition Facts label of packaged foods and drinks. The number of calories and the amount of carbs, fats, and other nutrients listed on the label are based on one serving of the item. Many items contain more than one serving per package.  Check the total grams (g) of carbs in one serving.  Check the number of grams of saturated fats and trans fats in one serving. Choose foods that have a low amount or none of these fats.  Check the number of milligrams (mg) of salt (sodium) in one serving. Most people should limit total sodium intake to less than 2,300 mg per day.  Always check the nutrition information of foods labeled as \"low-fat\" or \"nonfat.\" These foods may be higher in added sugar or refined carbs and should be avoided.  Talk to your dietitian to identify your daily goals for nutrients listed on the label.  Shopping  Avoid buying canned, pre-made, or processed foods. These foods tend to be high in fat, sodium, and added sugar.  Shop around the outside edge of the grocery store. This is where you will most often find fresh fruits and vegetables, bulk grains, fresh meats, and fresh dairy products.  Cooking  Use low-heat cooking methods, such as baking, instead of high-heat cooking methods, such as deep frying.  Cook using healthy oils, such as olive, canola, or sunflower oil.  Avoid cooking with butter, cream, or high-fat meats.  Meal planning  Eat meals and snacks regularly, preferably at the same times every day. Avoid going long periods " of time without eating.  Eat foods that are high in fiber, such as fresh fruits, vegetables, beans, and whole grains.  Eat 4-6 oz (112-168 g) of lean protein each day, such as lean meat, chicken, fish, eggs, or tofu. One ounce (oz) (28 g) of lean protein is equal to:  1 oz (28 g) of meat, chicken, or fish.  1 egg.  ¼ cup (62 g) of tofu.  Eat some foods each day that contain healthy fats, such as avocado, nuts, seeds, and fish.  What foods should I eat?  Fruits  Berries. Apples. Oranges. Peaches. Apricots. Plums. Grapes. Mangoes. Papayas. Pomegranates. Kiwi. Cherries.  Vegetables  Leafy greens, including lettuce, spinach, kale, chard, [place]ahaira greens, mustard greens, and cabbage. Beets. Cauliflower. Broccoli. Carrots. Green beans. Tomatoes. Peppers. Onions. Cucumbers. [place] sprouts.  Grains  Whole grains, such as whole-wheat or whole-grain bread, crackers, tortillas, cereal, and pasta. Unsweetened oatmeal. Quinoa. Brown or wild rice.  Meats and other proteins  Seafood. Poultry without skin. Lean cuts of poultry and beef. Tofu. Nuts. Seeds.  Dairy  Low-fat or fat-free dairy products such as milk, yogurt, and cheese.  The items listed above may not be a complete list of foods and beverages you can eat and drink. Contact a dietitian for more information.  What foods should I avoid?  Fruits  Fruits canned with syrup.  Vegetables  Canned vegetables. Frozen vegetables with butter or cream sauce.  Grains  Refined white flour and flour products such as bread, pasta, snack foods, and cereals. Avoid all processed foods.  Meats and other proteins  Fatty cuts of meat. Poultry with skin. Breaded or fried meats. Processed meat. Avoid saturated fats.  Dairy  Full-fat yogurt, cheese, or milk.  Beverages  Sweetened drinks, such as soda or iced tea.  The items listed above may not be a complete list of foods and beverages you should avoid. Contact a dietitian for more information.  Questions to ask a health care provider  Do I need  to meet with a certified diabetes care and ?  Do I need to meet with a dietitian?  What number can I call if I have questions?  When are the best times to check my blood glucose?  Where to find more information:  American Diabetes Association: diabetes.org  Academy of Nutrition and Dietetics: eatright.org  National [place] of Diabetes and Digestive and Kidney Diseases: niddk.nih.gov  Association of Diabetes Care & Education Specialists: diabeteseducator.org  Summary  It is important to have healthy eating habits because your blood sugar (glucose) levels are greatly affected by what you eat and drink. It is important to use alcohol carefully.  A healthy meal plan will help you manage your blood glucose and lower your risk of heart disease.  Your health care provider may recommend that you work with a dietitian to make a meal plan that is best for you.  This information is not intended to replace advice given to you by your health care provider. Make sure you discuss any questions you have with your health care provider.  Document Revised: 07/21/2021 Document Reviewed: 07/21/2021  Elsevier Patient Education © 2024 Elsevier Inc.

## 2025-07-09 NOTE — ASSESSMENT & PLAN NOTE
Likely cause of JAYNE  Offered referrals for assistance regarding substance abuse, though patient declines.  Stressed the importance of abstaining from illicit drugs especially in setting of recent JAYNE and known comorbidities.

## 2025-07-09 NOTE — PROGRESS NOTES
"     Follow Up Office Visit      Patient Name: Herminia Hernandez  : 1985   MRN: [id number]     Chief Complaint:    Chief Complaint   Patient presents with    Diabetes       History of Present Illness  39-year-old female presents today for follow-up on JAYNE, diabetes, and CHF.    Overall, patient has been feeling well.  Continues to deny any symptoms such as chest pain, shortness of breath, swelling, orthopnea, abdominal pain, nausea, vomiting, fever, chills, body aches.    We reviewed repeat labs that were completed on 7/3/2025 which were very reassuring and revealed normalization of kidney function.  We discussed the likely cause of her acute kidney injury was secondary to crystal meth use.  She continues to use \"ice\", stating her last use was last night, though she states \"I only did 1 line\".  She has struggled with substance use for several years, though states it has significantly decreased recently and she feels she can quit independently.  She is not interested in referrals for assistance with this.  She does report that she has people in her household that also use, which will add an additional barrier.    At her previous visit, due to significant JAYNE her metformin, Entresto, and Jardiance was discontinued.  She reports compliance with her Lantus, 10 units daily, though her blood sugar has remained elevated in 300-400s.   Her most recent A1c in  was 11.8.  She will restart metformin 500 mg twice daily (cannot tolerate higher dose) and Jardiance 25 mg daily.  We discussed restarting insulin if blood sugar remains above 200 consistently despite restarting these medications.  Unfortunately, she is quite a picky eater.  Her diet includes a high intake of bread, sandwiches, cereal, bananas, apples, and corn.  She does not enjoy any berries, vegetables.  Does state that she enjoys chicken and beef.  States she will begin to work on increasing exercise.         Subjective      I have reviewed and the " following portions of the patient's history were updated as appropriate: past family history, past medical history, past social history, past surgical history and problem list.    Medications:     Current Outpatient Medications:     albuterol sulfate  (90 Base) MCG/ACT inhaler, Inhale 2 puffs 4 (Four) Times a Day., Disp: 18 g, Rfl: 5    Blood Glucose Monitoring Suppl (OneTouch Verio Flex System) w/Device kit, USE TWICE DAILY TO CHECK BLOOD SUGAR, Disp: , Rfl:     bumetanide (BUMEX) 1 MG tablet, Take 1 tablet by mouth Daily., Disp: 90 tablet, Rfl: 3    Continuous Glucose  (FreeStyle Filippo 3 [place]) device, Use 1 Units Daily. E11.65, Disp: 1 each, Rfl: 0    Continuous Glucose Sensor (FreeStyle Filippo 3 Sensor) misc, Use 1 Units Every 14 (Fourteen) Days. E11.65, Disp: 2 each, Rfl: 11    empagliflozin (Jardiance) 25 MG tablet tablet, Take 1 tablet by mouth Daily., Disp: , Rfl:     Insulin Glargine (LANTUS SOLOSTAR) 100 UNIT/ML injection pen, Inject 10 Units under the skin into the appropriate area as directed Daily., Disp: 15 mL, Rfl: 0    Lancets (freestyle) lancets, Use twice daily to check blood sugar E11.65, Disp: 60 each, Rfl: 12    metFORMIN (GLUCOPHAGE) 500 MG tablet, Take 1 tablet by mouth 2 (Two) Times a Day With Meals., Disp: , Rfl:     metoprolol succinate XL (TOPROL-XL) 50 MG 24 hr tablet, Take 1 tablet by mouth Daily., Disp: 30 tablet, Rfl: 11    omeprazole (priLOSEC) 20 MG capsule, Take 1 capsule by mouth Daily., Disp: 90 capsule, Rfl: 3    sacubitril-valsartan (ENTRESTO) 24-26 MG tablet, Take 1 tablet by mouth Every 12 (Twelve) Hours., Disp: 180 tablet, Rfl: 3    digoxin (LANOXIN) 125 MCG tablet, Take 1 tablet by mouth Daily., Disp: 90 tablet, Rfl: 3    glucose blood test strip, Use as instructed up to 3 times per day., Disp: 100 each, Rfl: 12    glucose monitor monitoring kit, Use 1 each 1 (One) Time for 1 dose. Use as directed, Disp: 1 each, Rfl: 0    Insulin Pen Needle 32G X 4 MM misc,  "Use 1 each Daily., Disp: 100 each, Rfl: 5    Allergies:   Allergies   Allergen Reactions    Amoxicillin Swelling       Objective     Physical Exam:   Physical Exam  Constitutional:       General: She is not in acute distress.     Appearance: She is not ill-appearing.   HENT:      Head: Normocephalic.   Cardiovascular:      Rate and Rhythm: Normal rate and regular rhythm.   Pulmonary:      Effort: Pulmonary effort is normal. No respiratory distress.   Musculoskeletal:         General: Normal range of motion.   Skin:     General: Skin is warm.   Neurological:      General: No focal deficit present.      Mental Status: She is alert.   Psychiatric:         Mood and Affect: Mood normal.         Vital Signs:   Vitals:    07/09/25 0946   BP: 112/70   Pulse: 82   Temp: 98.9 °F (37.2 °C)   TempSrc: Infrared   Weight: 85.7 kg (189 lb)   Height: 167.6 cm (65.98\")   PainSc: 0-No pain     Body mass index is 30.52 kg/m².    Procedures    Assessment / Plan         Assessment and Plan     Diagnoses and all orders for this visit:    1. JAYNE (acute kidney injury)- Resolved (Primary)  Assessment & Plan:  Repeat labs on 7/3/2025 revealed resolution of JAYNE  Likely secondary to methamphetamine use.  Okay to restart Entresto, metformin, and Jardiance.  Will follow-up in approximately 8 weeks for repeat labs       2. Methamphetamine use  Assessment & Plan:  Likely cause of JAYNE  Offered referrals for assistance regarding substance abuse, though patient declines.  Stressed the importance of abstaining from illicit drugs especially in setting of recent JAYNE and known comorbidities.        3. Chronic systolic heart failure  Assessment & Plan:  Entresto initially discontinued due to JAYNE, though patient advised to restart  Continue Bumex as needed.  Continue close follow-up with cardiology  Stressed the importance of abstaining from methamphetamines       4. Type 2 diabetes mellitus with hyperglycemia, without long-term current use of " insulin  Assessment & Plan:  To restart metformin and Jardiance  Discussed if blood sugar continues to remain above 200, recommend titrating Lantus by 2 units until blood sugars remain below 200 consistently.  Continue to monitor blood sugars closely.  Follow-up in 8 weeks     Orders:  -     glucose monitor monitoring kit; Use 1 each 1 (One) Time for 1 dose. Use as directed  Dispense: 1 each; Refill: 0  -     glucose blood test strip; Use as instructed up to 3 times per day.  Dispense: 100 each; Refill: 12  -     Insulin Pen Needle 32G X 4 MM misc; Use 1 each Daily.  Dispense: 100 each; Refill: 5  -     POC Glucose             Follow Up:   Return in about 2 months (around 9/15/2025) for Annual physical, Fasting Labs.    Patient or patient representative verbalized consent for the use of Ambient Listening during the visit with  Sheridan Locke PA-C for chart documentation. 7/9/2025  09:56 EDT    Sheridan Locke PA-C    [facility]

## 2025-07-28 ENCOUNTER — TELEPHONE (OUTPATIENT)
Dept: FAMILY MEDICINE CLINIC | Facility: CLINIC | Age: 40
End: 2025-07-28
Payer: COMMERCIAL

## 2025-07-28 NOTE — TELEPHONE ENCOUNTER
Current PA for Freestyle Filippo 3 sensor expires soon, new PA automatically started. Key is [id number]

## 2025-07-30 DIAGNOSIS — E11.65 TYPE 2 DIABETES MELLITUS WITH HYPERGLYCEMIA, WITHOUT LONG-TERM CURRENT USE OF INSULIN: ICD-10-CM

## 2025-07-30 RX ORDER — INSULIN GLARGINE 100 [IU]/ML
INJECTION, SOLUTION SUBCUTANEOUS
Qty: 3 ML | Refills: 3 | Status: SHIPPED | OUTPATIENT
Start: 2025-07-30

## 2025-08-11 PROBLEM — I10 ESSENTIAL HYPERTENSION: Status: ACTIVE | Noted: 2025-08-11

## 2025-08-11 PROBLEM — R00.0 SINUS TACHYCARDIA: Status: ACTIVE | Noted: 2025-08-11

## 2025-08-13 RX ORDER — SEMAGLUTIDE 0.68 MG/ML
INJECTION, SOLUTION SUBCUTANEOUS
Qty: 3 ML | Refills: 5 | Status: SHIPPED | OUTPATIENT
Start: 2025-08-13

## 2025-08-13 RX ORDER — FLUCONAZOLE 150 MG/1
150 TABLET ORAL DAILY
Qty: 1 TABLET | Refills: 0 | Status: SHIPPED | OUTPATIENT
Start: 2025-08-13 | End: 2025-08-14

## 2025-08-14 RX ORDER — SACUBITRIL AND VALSARTAN 24; 26 MG/1; MG/1
1 TABLET, FILM COATED ORAL EVERY 12 HOURS SCHEDULED
Qty: 60 TABLET | Refills: 0 | Status: SHIPPED | OUTPATIENT
Start: 2025-08-14

## (undated) DEVICE — CATH DIAG EXPO M/ PK 6FR FL4/FR4 PIG 3PK

## (undated) DEVICE — PINNACLE INTRODUCER SHEATH: Brand: PINNACLE

## (undated) DEVICE — PK CATH CARD 10

## (undated) DEVICE — GW PERIPH VASC ADX J/TP SS .035 150CM 3MM

## (undated) DEVICE — ST EXT IV SMARTSITE 2VLV SP M LL 5ML IV1

## (undated) DEVICE — NDL PERC 1PRT THNWALL W/BASEPLT 18G 7CM

## (undated) DEVICE — KT MANIFOLD CATHLAB CUST